# Patient Record
Sex: MALE | Race: WHITE | NOT HISPANIC OR LATINO | Employment: FULL TIME | URBAN - METROPOLITAN AREA
[De-identification: names, ages, dates, MRNs, and addresses within clinical notes are randomized per-mention and may not be internally consistent; named-entity substitution may affect disease eponyms.]

---

## 2017-02-13 ENCOUNTER — TRANSCRIBE ORDERS (OUTPATIENT)
Dept: ADMINISTRATIVE | Facility: HOSPITAL | Age: 72
End: 2017-02-13

## 2017-02-13 DIAGNOSIS — R06.02 SOB (SHORTNESS OF BREATH): Primary | ICD-10-CM

## 2017-02-26 ENCOUNTER — APPOINTMENT (OUTPATIENT)
Dept: LAB | Age: 72
End: 2017-02-26
Payer: MEDICARE

## 2017-02-26 DIAGNOSIS — R73.09 OTHER ABNORMAL GLUCOSE: ICD-10-CM

## 2017-02-26 DIAGNOSIS — I10 ESSENTIAL (PRIMARY) HYPERTENSION: ICD-10-CM

## 2017-02-26 DIAGNOSIS — E78.5 HYPERLIPIDEMIA: ICD-10-CM

## 2017-02-26 LAB
ANION GAP SERPL CALCULATED.3IONS-SCNC: 5 MMOL/L (ref 4–13)
BUN SERPL-MCNC: 15 MG/DL (ref 5–25)
CALCIUM SERPL-MCNC: 9.3 MG/DL (ref 8.3–10.1)
CHLORIDE SERPL-SCNC: 106 MMOL/L (ref 100–108)
CHOLEST SERPL-MCNC: 181 MG/DL (ref 50–200)
CO2 SERPL-SCNC: 29 MMOL/L (ref 21–32)
CREAT SERPL-MCNC: 1.11 MG/DL (ref 0.6–1.3)
EST. AVERAGE GLUCOSE BLD GHB EST-MCNC: 111 MG/DL
GFR SERPL CREATININE-BSD FRML MDRD: >60 ML/MIN/1.73SQ M
GLUCOSE SERPL-MCNC: 109 MG/DL (ref 65–140)
HBA1C MFR BLD: 5.5 % (ref 4.2–6.3)
HDLC SERPL-MCNC: 44 MG/DL (ref 40–60)
LDLC SERPL CALC-MCNC: 107 MG/DL (ref 0–100)
POTASSIUM SERPL-SCNC: 4.1 MMOL/L (ref 3.5–5.3)
SODIUM SERPL-SCNC: 140 MMOL/L (ref 136–145)
TRIGL SERPL-MCNC: 152 MG/DL

## 2017-02-26 PROCEDURE — 80061 LIPID PANEL: CPT

## 2017-02-26 PROCEDURE — 80048 BASIC METABOLIC PNL TOTAL CA: CPT

## 2017-02-26 PROCEDURE — 83036 HEMOGLOBIN GLYCOSYLATED A1C: CPT

## 2017-02-26 PROCEDURE — 36415 COLL VENOUS BLD VENIPUNCTURE: CPT

## 2017-03-13 RX ORDER — ALBUTEROL SULFATE 2.5 MG/3ML
2.5 SOLUTION RESPIRATORY (INHALATION) ONCE AS NEEDED
Status: CANCELLED | OUTPATIENT
Start: 2017-03-13

## 2017-03-15 ENCOUNTER — HOSPITAL ENCOUNTER (OUTPATIENT)
Dept: PULMONOLOGY | Facility: HOSPITAL | Age: 72
Discharge: HOME | End: 2017-03-15
Attending: INTERNAL MEDICINE
Payer: MEDICARE

## 2017-03-28 ENCOUNTER — ALLSCRIPTS OFFICE VISIT (OUTPATIENT)
Dept: OTHER | Facility: OTHER | Age: 72
End: 2017-03-28

## 2017-04-03 ENCOUNTER — HOSPITAL ENCOUNTER (OUTPATIENT)
Dept: PULMONOLOGY | Facility: HOSPITAL | Age: 72
Discharge: HOME | End: 2017-04-03
Attending: INTERNAL MEDICINE
Payer: MEDICARE

## 2017-04-03 RX ORDER — ALBUTEROL SULFATE 2.5 MG/3ML
2.5 SOLUTION RESPIRATORY (INHALATION) ONCE AS NEEDED
Status: DISCONTINUED | OUTPATIENT
Start: 2017-04-03 | End: 2017-04-06 | Stop reason: HOSPADM

## 2017-04-18 ENCOUNTER — GENERIC CONVERSION - ENCOUNTER (OUTPATIENT)
Dept: OTHER | Facility: OTHER | Age: 72
End: 2017-04-18

## 2017-04-18 ENCOUNTER — HOSPITAL ENCOUNTER (OUTPATIENT)
Dept: PULMONOLOGY | Facility: HOSPITAL | Age: 72
Discharge: HOME | End: 2017-04-18
Attending: INTERNAL MEDICINE
Payer: MEDICARE

## 2017-04-18 DIAGNOSIS — R06.02 SOB (SHORTNESS OF BREATH): ICD-10-CM

## 2017-04-18 PROCEDURE — 94070 EVALUATION OF WHEEZING: CPT

## 2017-04-18 PROCEDURE — 94726 PLETHYSMOGRAPHY LUNG VOLUMES: CPT

## 2017-04-18 PROCEDURE — 94760 N-INVAS EAR/PLS OXIMETRY 1: CPT

## 2017-04-18 PROCEDURE — 94729 DIFFUSING CAPACITY: CPT

## 2017-04-18 RX ORDER — ALBUTEROL SULFATE 2.5 MG/3ML
2.5 SOLUTION RESPIRATORY (INHALATION) ONCE AS NEEDED
Status: COMPLETED | OUTPATIENT
Start: 2017-04-18 | End: 2017-04-18

## 2017-04-18 RX ADMIN — ALBUTEROL SULFATE 2.5 MG: 2.5 SOLUTION RESPIRATORY (INHALATION) at 14:15

## 2017-04-18 RX ADMIN — METHACHOLINE CHLORIDE 5 BREATH: 100 POWDER, FOR SOLUTION RESPIRATORY (INHALATION) at 13:30

## 2017-07-22 ENCOUNTER — APPOINTMENT (OUTPATIENT)
Dept: LAB | Age: 72
End: 2017-07-22
Payer: MEDICARE

## 2017-07-22 ENCOUNTER — TRANSCRIBE ORDERS (OUTPATIENT)
Dept: ADMINISTRATIVE | Age: 72
End: 2017-07-22

## 2017-07-22 DIAGNOSIS — I10 ESSENTIAL (PRIMARY) HYPERTENSION: ICD-10-CM

## 2017-07-22 DIAGNOSIS — Z86.39 PERSONAL HISTORY OF OTHER ENDOCRINE, NUTRITIONAL AND METABOLIC DISEASE: ICD-10-CM

## 2017-07-22 DIAGNOSIS — Z12.5 ENCOUNTER FOR SCREENING FOR MALIGNANT NEOPLASM OF PROSTATE: ICD-10-CM

## 2017-07-22 DIAGNOSIS — E78.1 PURE HYPERGLYCERIDEMIA: ICD-10-CM

## 2017-07-22 DIAGNOSIS — R73.09 OTHER ABNORMAL GLUCOSE: ICD-10-CM

## 2017-07-22 LAB
ALBUMIN SERPL BCP-MCNC: 3.7 G/DL (ref 3.5–5)
ALP SERPL-CCNC: 98 U/L (ref 46–116)
ALT SERPL W P-5'-P-CCNC: 33 U/L (ref 12–78)
ANION GAP SERPL CALCULATED.3IONS-SCNC: 6 MMOL/L (ref 4–13)
AST SERPL W P-5'-P-CCNC: 23 U/L (ref 5–45)
BILIRUB SERPL-MCNC: 0.78 MG/DL (ref 0.2–1)
BUN SERPL-MCNC: 13 MG/DL (ref 5–25)
CALCIUM SERPL-MCNC: 8.9 MG/DL (ref 8.3–10.1)
CHLORIDE SERPL-SCNC: 108 MMOL/L (ref 100–108)
CHOLEST SERPL-MCNC: 135 MG/DL (ref 50–200)
CO2 SERPL-SCNC: 26 MMOL/L (ref 21–32)
CREAT SERPL-MCNC: 1 MG/DL (ref 0.6–1.3)
EST. AVERAGE GLUCOSE BLD GHB EST-MCNC: 111 MG/DL
GFR SERPL CREATININE-BSD FRML MDRD: >60 ML/MIN/1.73SQ M
GLUCOSE P FAST SERPL-MCNC: 99 MG/DL (ref 65–99)
HBA1C MFR BLD: 5.5 % (ref 4.2–6.3)
HDLC SERPL-MCNC: 37 MG/DL (ref 40–60)
LDLC SERPL CALC-MCNC: 76 MG/DL (ref 0–100)
POTASSIUM SERPL-SCNC: 3.6 MMOL/L (ref 3.5–5.3)
PROT SERPL-MCNC: 7 G/DL (ref 6.4–8.2)
PSA SERPL-MCNC: 1.7 NG/ML (ref 0–4)
SODIUM SERPL-SCNC: 140 MMOL/L (ref 136–145)
TRIGL SERPL-MCNC: 109 MG/DL

## 2017-07-22 PROCEDURE — 36415 COLL VENOUS BLD VENIPUNCTURE: CPT

## 2017-07-22 PROCEDURE — 80061 LIPID PANEL: CPT

## 2017-07-22 PROCEDURE — 80053 COMPREHEN METABOLIC PANEL: CPT

## 2017-07-22 PROCEDURE — G0103 PSA SCREENING: HCPCS

## 2017-07-22 PROCEDURE — 83036 HEMOGLOBIN GLYCOSYLATED A1C: CPT

## 2017-07-24 DIAGNOSIS — E78.1 PURE HYPERGLYCERIDEMIA: ICD-10-CM

## 2017-07-24 DIAGNOSIS — I10 ESSENTIAL (PRIMARY) HYPERTENSION: ICD-10-CM

## 2017-07-24 DIAGNOSIS — R73.09 OTHER ABNORMAL GLUCOSE: ICD-10-CM

## 2017-07-24 DIAGNOSIS — Z86.39 PERSONAL HISTORY OF OTHER ENDOCRINE, NUTRITIONAL AND METABOLIC DISEASE: ICD-10-CM

## 2017-07-24 DIAGNOSIS — Z12.5 ENCOUNTER FOR SCREENING FOR MALIGNANT NEOPLASM OF PROSTATE: ICD-10-CM

## 2017-07-31 ENCOUNTER — GENERIC CONVERSION - ENCOUNTER (OUTPATIENT)
Dept: OTHER | Facility: OTHER | Age: 72
End: 2017-07-31

## 2017-08-10 ENCOUNTER — ALLSCRIPTS OFFICE VISIT (OUTPATIENT)
Dept: OTHER | Facility: OTHER | Age: 72
End: 2017-08-10

## 2017-10-11 ENCOUNTER — GENERIC CONVERSION - ENCOUNTER (OUTPATIENT)
Dept: OTHER | Facility: OTHER | Age: 72
End: 2017-10-11

## 2017-10-26 ENCOUNTER — GENERIC CONVERSION - ENCOUNTER (OUTPATIENT)
Dept: OTHER | Facility: OTHER | Age: 72
End: 2017-10-26

## 2017-11-07 ENCOUNTER — GENERIC CONVERSION - ENCOUNTER (OUTPATIENT)
Dept: OTHER | Facility: OTHER | Age: 72
End: 2017-11-07

## 2017-11-26 ENCOUNTER — APPOINTMENT (OUTPATIENT)
Dept: LAB | Age: 72
End: 2017-11-26
Payer: MEDICARE

## 2017-11-26 DIAGNOSIS — I10 ESSENTIAL (PRIMARY) HYPERTENSION: ICD-10-CM

## 2017-11-26 DIAGNOSIS — R73.09 OTHER ABNORMAL GLUCOSE: ICD-10-CM

## 2017-11-26 DIAGNOSIS — E78.1 PURE HYPERGLYCERIDEMIA: ICD-10-CM

## 2017-11-26 DIAGNOSIS — Z86.39 PERSONAL HISTORY OF OTHER ENDOCRINE, NUTRITIONAL AND METABOLIC DISEASE: ICD-10-CM

## 2017-11-26 LAB
ALBUMIN SERPL BCP-MCNC: 3.8 G/DL (ref 3.5–5)
ALP SERPL-CCNC: 106 U/L (ref 46–116)
ALT SERPL W P-5'-P-CCNC: 39 U/L (ref 12–78)
ANION GAP SERPL CALCULATED.3IONS-SCNC: 4 MMOL/L (ref 4–13)
AST SERPL W P-5'-P-CCNC: 32 U/L (ref 5–45)
BILIRUB SERPL-MCNC: 0.74 MG/DL (ref 0.2–1)
BUN SERPL-MCNC: 14 MG/DL (ref 5–25)
CALCIUM SERPL-MCNC: 9.3 MG/DL (ref 8.3–10.1)
CHLORIDE SERPL-SCNC: 107 MMOL/L (ref 100–108)
CHOLEST SERPL-MCNC: 159 MG/DL (ref 50–200)
CO2 SERPL-SCNC: 29 MMOL/L (ref 21–32)
CREAT SERPL-MCNC: 1.08 MG/DL (ref 0.6–1.3)
EST. AVERAGE GLUCOSE BLD GHB EST-MCNC: 108 MG/DL
GFR SERPL CREATININE-BSD FRML MDRD: 67 ML/MIN/1.73SQ M
GLUCOSE P FAST SERPL-MCNC: 92 MG/DL (ref 65–99)
HBA1C MFR BLD: 5.4 % (ref 4.2–6.3)
HDLC SERPL-MCNC: 43 MG/DL (ref 40–60)
LDLC SERPL CALC-MCNC: 91 MG/DL (ref 0–100)
POTASSIUM SERPL-SCNC: 4.3 MMOL/L (ref 3.5–5.3)
PROT SERPL-MCNC: 7.6 G/DL (ref 6.4–8.2)
SODIUM SERPL-SCNC: 140 MMOL/L (ref 136–145)
TRIGL SERPL-MCNC: 126 MG/DL

## 2017-11-26 PROCEDURE — 36415 COLL VENOUS BLD VENIPUNCTURE: CPT

## 2017-11-26 PROCEDURE — 80053 COMPREHEN METABOLIC PANEL: CPT

## 2017-11-26 PROCEDURE — 83036 HEMOGLOBIN GLYCOSYLATED A1C: CPT

## 2017-11-26 PROCEDURE — 80061 LIPID PANEL: CPT

## 2017-12-01 DIAGNOSIS — R73.09 OTHER ABNORMAL GLUCOSE: ICD-10-CM

## 2017-12-01 DIAGNOSIS — Z86.39 PERSONAL HISTORY OF OTHER ENDOCRINE, NUTRITIONAL AND METABOLIC DISEASE: ICD-10-CM

## 2017-12-01 DIAGNOSIS — E78.1 PURE HYPERGLYCERIDEMIA: ICD-10-CM

## 2017-12-01 DIAGNOSIS — I10 ESSENTIAL (PRIMARY) HYPERTENSION: ICD-10-CM

## 2017-12-11 ENCOUNTER — ALLSCRIPTS OFFICE VISIT (OUTPATIENT)
Dept: OTHER | Facility: OTHER | Age: 72
End: 2017-12-11

## 2017-12-12 NOTE — PROGRESS NOTES
Assessment    1  Abnormal blood sugar (790 29) (R73 09)   2  Benign essential hypertension (401 1) (I10)   3  CAD in native artery (414 01) (I25 10)   4  ED (erectile dysfunction) (607 84) (N52 9)   5  Hypercholesterolemia (272 0) (E78 00)   6  Hypertriglyceridemia (272 1) (E78 1)    Plan  Abnormal blood sugar, Benign essential hypertension, CAD in native artery, Hypercholesterolemia,Hypertriglyceridemia    · (1) HEMOGLOBIN A1C; Status:Active; Requested for:[id number]; Benign essential hypertension, CAD in native artery, Hypercholesterolemia, Hypertriglyceridemia    · (1) COMPREHENSIVE METABOLIC PANEL; Status:Active; Requested for:[id number];    · (1) LIPID PANEL FASTING W DIRECT LDL REFLEX; Status:Active; Requested for:[id number];   1  Mild glucose intolerance good blood sugar control continue careful lifestyle with avoidance of high carbohydrate foods and high calorie foods  2   Hypertension initial presentation blood pressure was mildly elevated today but subsequent reading have reviewed turned into a safe acceptable range patient will continue on his present medications  3   Coronary artery disease without any signs as or symptoms of angina palpitations or shortness of breath  4   Erectile dysfunction patient was provided with samples of Viagra 100 mg numbers for eye was instructed take a half a pill on his 1st attempt and if not successful to try a full pill  He should not use any nitroglycerin type medications in conjunction with the Viagra  He has previously tried 25 mg of Viagra without any benefit  5   Hypercholesterolemia and hypertriglyceridemia profile was reviewed with the patient shows good control he is encouraged to continue on a healthy diet regular walking exercise and weight control along with his statin he is present on 40 mg of atorvastatin daily  Discussion/Summary  Discussion Summary:   In summary the patient presents today for a routine 4 month follow-up visit   Reviewed with him his most recent blood work as well as reviewed his medications  He will be having a surgical procedure on his left shoulder in the near future because of a partial tear to his lower rotator cuff  I will see the patient in 4 months for his next regular office visit he was provided with a request for blood work to be done at that time  Counseling Documentation With Imm: total time of encounter was 30 minutes-- and-- 20 minutes was spent counseling  Chief Complaint  Chief Complaint Free Text Note Form: patient is here for a 4 month follow up  History of Present Illness  HPI: This is a 4 month follow-up visit for this 24-year-old gentleman  He presents today for a medical checkup as well as review of blood work  He has a history of mild glucose intolerance hypertension coronary artery disease hypertriglyceridemia and hypercholesterolemia  He also has a history of erectile dysfunction  He is provided with samples of Viagra 100 mg to be taken 1/2 tablet p r n  and if unsuccessful to increase to 1 tablet p r n  Lynita Ped patient is hypertension appears to be adequately controlled this initial reading was slightly elevated but subsequent reading was within acceptable range  He has a history of high cholesterol and triglyceride values both of which were within normal range on his most recent set of blood work which were was reviewed in detail with the patient  The patient's previous glucose intolerance seems to be well controlled with a normal hemoglobin A1c and fasting glucose  Patient reports no chest pains palpitations or shortness of breath or peripheral edema at this time  does have left shoulder pain and will be seeing [place] Dustin Garcia [place] ([facility]) for surgical intervention in the near future  Review of Systems  Complete-Male:  Constitutional: No fever or chills, feels well, no tiredness, no recent weight gain or weight loss    Eyes: No complaints of eye pain, no red eyes, no discharge from eyes, no itchy eyes   ENT: no complaints of earache, no hearing loss, no nosebleeds, no nasal discharge, no sore throat, no hoarseness  Cardiovascular: No complaints of slow heart rate, no fast heart rate, no chest pain, no palpitations, no leg claudication, no lower extremity  Respiratory: No complaints of shortness of breath, no wheezing, no cough, no SOB on exertion, no orthopnea or PND  Gastrointestinal: No complaints of abdominal pain, no constipation, no nausea or vomiting, no diarrhea or bloody stools  Genitourinary: No complaints of dysuria, no incontinence, no hesitancy, no nocturia, no genital lesion, no testicular pain  Musculoskeletal: Left shoulder pain, but-- as noted in HPI  Integumentary: No complaints of skin rash or skin lesions, no itching, no skin wound, no dry skin  Neurological: No compliants of headache, no confusion, no convulsions, no numbness or tingling, no dizziness or fainting, no limb weakness, no difficulty walking  Psychiatric: Is not suicidal, no sleep disturbances, no anxiety or depression, no change in personality, no emotional problems  Endocrine: No complaints of proptosis, no hot flashes, no muscle weakness, no erectile dysfunction, no deepening of the voice, no feelings of weakness  Hematologic/Lymphatic: No complaints of swollen glands, no swollen glands in the neck, does not bleed easily, no easy bruising  Active Problems  1  Abnormal blood sugar (790 29) (R73 09)   2  Benign essential hypertension (401 1) (I10)   3  CAD in native artery (414 01) (I25 10)   4  ED (erectile dysfunction) (607 84) (N52 9)   5  Hypercholesterolemia (272 0) (E78 00)   6  Hypertriglyceridemia (272 1) (E78 1)   7  Lumbar canal stenosis (724 02) (M48 061)   8  Need for vaccination with 13-polyvalent pneumococcal conjugate vaccine (V03 82) (Z23)   9  Prostate cancer screening (V76 44) (Z12 5)   10  Restrictive lung disease (518 89) (J98 4)    Past Medical History  1   History of Elevated liver enzymes (790 5) (R74 8)   2  History of Rib pain on right side (786 50) (R07 81)  Active Problems And Past Medical History Reviewed: The active problems and past medical history were reviewed and updated today  Surgical History  Surgical History Reviewed: The surgical history was reviewed and updated today  Family History  Mother    1  Family history of Congestive heart failure (428 0) (I50 9)  Father    2  Family history of Congestive heart failure (428 0) (I50 9)  Brother    3  Family history of Stroke (434 91) (I63 9)  Family History Reviewed: The family history was reviewed and updated today  Social History     · Never smoker  Social History Reviewed: The social history was reviewed and updated today  The social history was reviewed and is unchanged  Current Meds   1  Atorvastatin Calcium 40 MG Oral Tablet; Take 1 tablet daily; Therapy: 77Xus4023 to (Evaluate:20Lnr5221); Last Rx:10Aug2017 Ordered   2  DilTIAZem HCl - 120 MG Oral Tablet; Therapy: 68XIM1198 to Recorded   3  Ecotrin 325 MG Oral Tablet Delayed Release; 1 TABLET DAILY Recorded   4  Fish Oil 1200 MG Oral Capsule; Therapy: 27CBB1065 to Recorded   5  Lisinopril TABS Recorded   6  Nitrostat 0 4 MG Sublingual Tablet Sublingual; PLACE 1 TABLET UNDER THE TONGUE EVERY 5 MINUTES FOR UP TO 3 DOSES AS NEEDED FOR CHEST PAIN  CALL 911 IF PAIN PERSISTS; Therapy: 47MPF0708 to (Evaluate:45Nfb6670); Last Rx:60Cns2141 Ordered   7  Viagra 100 MG Oral Tablet; TAKE AS DIRECTED  MAX 1/DAY; Therapy: 06LJD0565 to (Evaluate:16Nov2017); Last Rx:23Oct2017 Ordered   8  Warfarin Sodium 5 MG Oral Tablet; TAKE 1 OR 2 TABLETS DAILY AS DIRECTED Recorded  Medication List Reviewed: The medication list was reviewed and updated today  Allergies  1  No Known Environmental Allergies   2   No Known Food Allergies    Vitals  Vital Signs    Recorded: 37Kby3605 03:40PM Recorded: 65MOX3831 03:19PM   Temperature  97 8 F   Heart Rate  78   Respiration  14   Systolic 942 936   Diastolic 74 88   Height  5 ft 11 in   Weight  184 lb 0 2 oz   BMI Calculated  25 66   BSA Calculated  2 04   O2 Saturation  98       Physical Exam   Constitutional  General appearance: No acute distress, well appearing and well nourished  Eyes  Conjunctiva and lids: No swelling, erythema, or discharge  Ears, Nose, Mouth, and Throat  External inspection of ears and nose: Normal    Pulmonary  Respiratory effort: No increased work of breathing or signs of respiratory distress  Auscultation of lungs: Clear to auscultation, equal breath sounds bilaterally, no wheezes, no rales, no rhonci  Cardiovascular  Auscultation of heart: Normal rate and rhythm, normal S1 and S2, without murmurs  Examination of extremities for edema and/or varicosities: Normal    Carotid pulses: Normal          Future Appointments    Date/Time Provider Specialty Site   04/10/2018 02:30 PM DELMIS Cheng   Internal Medicine [facility]       Signatures   Electronically signed by : DELMIS Pelletier ; Dec 11 2017  6:35PM EST                       (Author)

## 2018-01-13 VITALS
WEIGHT: 187 LBS | OXYGEN SATURATION: 97 % | HEART RATE: 80 BPM | RESPIRATION RATE: 16 BRPM | HEIGHT: 71 IN | TEMPERATURE: 97.5 F | DIASTOLIC BLOOD PRESSURE: 78 MMHG | BODY MASS INDEX: 26.18 KG/M2 | SYSTOLIC BLOOD PRESSURE: 140 MMHG

## 2018-01-14 VITALS
TEMPERATURE: 96.6 F | HEIGHT: 71 IN | SYSTOLIC BLOOD PRESSURE: 130 MMHG | WEIGHT: 184 LBS | DIASTOLIC BLOOD PRESSURE: 66 MMHG | BODY MASS INDEX: 25.76 KG/M2 | HEART RATE: 69 BPM | OXYGEN SATURATION: 97 % | RESPIRATION RATE: 16 BRPM

## 2018-01-23 VITALS
SYSTOLIC BLOOD PRESSURE: 130 MMHG | TEMPERATURE: 97.8 F | HEIGHT: 71 IN | OXYGEN SATURATION: 98 % | BODY MASS INDEX: 25.76 KG/M2 | DIASTOLIC BLOOD PRESSURE: 74 MMHG | WEIGHT: 184.01 LBS | RESPIRATION RATE: 14 BRPM | HEART RATE: 78 BPM

## 2018-03-30 ENCOUNTER — APPOINTMENT (OUTPATIENT)
Dept: LAB | Age: 73
End: 2018-03-30
Payer: MEDICARE

## 2018-03-30 DIAGNOSIS — E78.1 PURE HYPERGLYCERIDEMIA: ICD-10-CM

## 2018-03-30 DIAGNOSIS — I10 ESSENTIAL (PRIMARY) HYPERTENSION: ICD-10-CM

## 2018-03-30 DIAGNOSIS — E78.00 PURE HYPERCHOLESTEROLEMIA: ICD-10-CM

## 2018-03-30 DIAGNOSIS — I25.10 ATHEROSCLEROTIC HEART DISEASE OF NATIVE CORONARY ARTERY WITHOUT ANGINA PECTORIS: ICD-10-CM

## 2018-03-30 DIAGNOSIS — R73.09 OTHER ABNORMAL GLUCOSE: ICD-10-CM

## 2018-03-30 LAB
ALBUMIN SERPL BCP-MCNC: 3.5 G/DL (ref 3.5–5)
ALP SERPL-CCNC: 111 U/L (ref 46–116)
ALT SERPL W P-5'-P-CCNC: 33 U/L (ref 12–78)
ANION GAP SERPL CALCULATED.3IONS-SCNC: 6 MMOL/L (ref 4–13)
AST SERPL W P-5'-P-CCNC: 26 U/L (ref 5–45)
BILIRUB SERPL-MCNC: 1.05 MG/DL (ref 0.2–1)
BUN SERPL-MCNC: 13 MG/DL (ref 5–25)
CALCIUM SERPL-MCNC: 8.8 MG/DL (ref 8.3–10.1)
CHLORIDE SERPL-SCNC: 110 MMOL/L (ref 100–108)
CHOLEST SERPL-MCNC: 163 MG/DL (ref 50–200)
CO2 SERPL-SCNC: 27 MMOL/L (ref 21–32)
CREAT SERPL-MCNC: 1.09 MG/DL (ref 0.6–1.3)
EST. AVERAGE GLUCOSE BLD GHB EST-MCNC: 114 MG/DL
GFR SERPL CREATININE-BSD FRML MDRD: 67 ML/MIN/1.73SQ M
GLUCOSE P FAST SERPL-MCNC: 94 MG/DL (ref 65–99)
HBA1C MFR BLD: 5.6 % (ref 4.2–6.3)
HDLC SERPL-MCNC: 36 MG/DL (ref 40–60)
LDLC SERPL CALC-MCNC: 102 MG/DL (ref 0–100)
POTASSIUM SERPL-SCNC: 4.1 MMOL/L (ref 3.5–5.3)
PROT SERPL-MCNC: 7.7 G/DL (ref 6.4–8.2)
SODIUM SERPL-SCNC: 143 MMOL/L (ref 136–145)
TRIGL SERPL-MCNC: 127 MG/DL

## 2018-03-30 PROCEDURE — 80061 LIPID PANEL: CPT

## 2018-03-30 PROCEDURE — 80053 COMPREHEN METABOLIC PANEL: CPT

## 2018-03-30 PROCEDURE — 36415 COLL VENOUS BLD VENIPUNCTURE: CPT

## 2018-03-30 PROCEDURE — 83036 HEMOGLOBIN GLYCOSYLATED A1C: CPT

## 2018-04-10 ENCOUNTER — OFFICE VISIT (OUTPATIENT)
Dept: INTERNAL MEDICINE CLINIC | Facility: CLINIC | Age: 73
End: 2018-04-10
Payer: MEDICARE

## 2018-04-10 VITALS
BODY MASS INDEX: 26.52 KG/M2 | SYSTOLIC BLOOD PRESSURE: 156 MMHG | WEIGHT: 189.4 LBS | HEIGHT: 71 IN | TEMPERATURE: 97.7 F | HEART RATE: 67 BPM | OXYGEN SATURATION: 93 % | DIASTOLIC BLOOD PRESSURE: 94 MMHG | RESPIRATION RATE: 14 BRPM

## 2018-04-10 DIAGNOSIS — E78.5 HYPERLIPIDEMIA, UNSPECIFIED HYPERLIPIDEMIA TYPE: Primary | ICD-10-CM

## 2018-04-10 DIAGNOSIS — J98.4 RESTRICTIVE LUNG DISEASE: ICD-10-CM

## 2018-04-10 DIAGNOSIS — E78.00 HYPERCHOLESTEROLEMIA: ICD-10-CM

## 2018-04-10 DIAGNOSIS — E78.1 HYPERTRIGLYCERIDEMIA: ICD-10-CM

## 2018-04-10 DIAGNOSIS — E74.39 GLUCOSE INTOLERANCE: ICD-10-CM

## 2018-04-10 DIAGNOSIS — E66.3 OVER WEIGHT: ICD-10-CM

## 2018-04-10 DIAGNOSIS — I25.10 CAD IN NATIVE ARTERY: ICD-10-CM

## 2018-04-10 DIAGNOSIS — I10 ESSENTIAL HYPERTENSION: ICD-10-CM

## 2018-04-10 PROCEDURE — 99215 OFFICE O/P EST HI 40 MIN: CPT | Performed by: INTERNAL MEDICINE

## 2018-04-10 RX ORDER — DILTIAZEM HYDROCHLORIDE 120 MG/1
120 TABLET, FILM COATED ORAL
COMMUNITY
Start: 2017-08-10 | End: 2018-12-21 | Stop reason: SDUPTHER

## 2018-04-10 RX ORDER — LISINOPRIL 10 MG/1
20 TABLET ORAL DAILY
Qty: 60 TABLET | Refills: 0 | Status: SHIPPED | OUTPATIENT
Start: 2018-04-10 | End: 2019-08-15 | Stop reason: SDUPTHER

## 2018-04-10 RX ORDER — LISINOPRIL 30 MG/1
30 TABLET ORAL
COMMUNITY
End: 2018-04-10 | Stop reason: SDUPTHER

## 2018-04-10 RX ORDER — WARFARIN SODIUM 5 MG/1
5 TABLET ORAL
COMMUNITY

## 2018-04-10 RX ORDER — SILDENAFIL 100 MG/1
100 TABLET, FILM COATED ORAL
COMMUNITY
Start: 2017-10-23 | End: 2022-01-18

## 2018-04-10 RX ORDER — ATORVASTATIN CALCIUM 40 MG/1
40 TABLET, FILM COATED ORAL DAILY
COMMUNITY
Start: 2016-09-12 | End: 2018-10-13 | Stop reason: SDUPTHER

## 2018-04-10 RX ORDER — AMOXICILLIN 500 MG
CAPSULE ORAL
COMMUNITY
Start: 2017-08-10

## 2018-04-10 RX ORDER — NITROGLYCERIN 0.4 MG/1
1 TABLET SUBLINGUAL
COMMUNITY
Start: 2016-05-11

## 2018-04-11 DIAGNOSIS — R73.09 OTHER ABNORMAL GLUCOSE: ICD-10-CM

## 2018-04-11 DIAGNOSIS — E78.00 PURE HYPERCHOLESTEROLEMIA: ICD-10-CM

## 2018-04-11 DIAGNOSIS — I10 ESSENTIAL (PRIMARY) HYPERTENSION: ICD-10-CM

## 2018-04-11 DIAGNOSIS — I25.10 ATHEROSCLEROTIC HEART DISEASE OF NATIVE CORONARY ARTERY WITHOUT ANGINA PECTORIS: ICD-10-CM

## 2018-04-11 DIAGNOSIS — E78.1 PURE HYPERGLYCERIDEMIA: ICD-10-CM

## 2018-04-11 PROBLEM — E74.39 GLUCOSE INTOLERANCE: Status: ACTIVE | Noted: 2018-04-11

## 2018-04-11 PROBLEM — Z79.01 ANTICOAGULANT LONG-TERM USE: Status: ACTIVE | Noted: 2017-10-11

## 2018-04-11 PROBLEM — N52.9 ED (ERECTILE DYSFUNCTION): Status: ACTIVE | Noted: 2017-08-10

## 2018-04-11 PROBLEM — E66.3 OVER WEIGHT: Status: ACTIVE | Noted: 2018-04-11

## 2018-04-11 NOTE — ASSESSMENT & PLAN NOTE
History of coronary artery disease no symptoms at this point of chest pain or palpitations continue follow-up with Cardiology

## 2018-04-11 NOTE — ASSESSMENT & PLAN NOTE
Recent lipid profile reviewed with the patient including his triglyceride values healthy diet reviewed with patient recheck values in 4 months    Continue on a toward his statin 40 milligrams daily also continue Omega 3 fatty acids

## 2018-04-11 NOTE — ASSESSMENT & PLAN NOTE
Ongoing SW/CM Assessment/Plan of Care Note     See SW/CM flowsheets for goals and other objective data.    Patient/Family discharge goal (s):             PT Recommendation:       OT Recommendation:       SLP Recommendation:       Disposition:       Progress note:   Patient admitted with abdominal pain and vomiting.  Patient has a past history of alcohol abuse and hypertension.  Patient usually receives his care at [facility].  GI on consult.  EGD completed along with CT of abdomen and ultrasound of liver, gallbladder and pancreas.  Patient continues to be nauseated with emesis.  Hida scan completed today. Patient lives with his sister and states he can return to his prior living arrangement.     Mary Flores RN  Inpatient            Follow-up History of glucose intolerance most recent blood sugar for this visit is a reading of 94 with a hemoglobin A1c reading of 5 6    Continue dietary control of glucose intolerance follow-up blood work in 4 months please

## 2018-04-11 NOTE — PROGRESS NOTES
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  The patient does have some shortness of breath on exertion which does not seem to be associated with chest discomfort  I suspect it is more pulmonary limitation on exertion than cardiac at this point  Essential hypertension  The patient's blood pressure today is elevated and he indicates that has been elevated at several other office visits recently  He is presently on 10 milligrams of lisinopril daily and I have asked him to increase his lisinopril to 20 milligrams daily  He has an upcoming visit in the next week to week and a half with his cardiologist which will be perfect timing for follow-up assessment of his blood pressure  He is also presently on Cardizem 120 milligrams daily with a heart rate of 67 beats per minute I am hesitant to increase the strength of the calcium channel blocker at this time  CAD in native artery  History of coronary artery disease no symptoms at this point of chest pain or palpitations continue follow-up with Cardiology  Glucose intolerance  Follow-up History of glucose intolerance most recent blood sugar for this visit is a reading of 94 with a hemoglobin A1c reading of 5 6  Continue dietary control of glucose intolerance follow-up blood work in 4 months please    Hypercholesterolemia  Results of recent lipid profile reviewed with the patient he is presently on atorvastatin 40 milligrams daily healthy diet reviewed exercise and weight reduction also advised will recheck lipid profile in 4 months    Hypertriglyceridemia  Recent lipid profile reviewed with the patient including his triglyceride values healthy diet reviewed with patient recheck values in 4 months    Continue on a toward his statin 40 milligrams daily also continue Omega 3 fatty acids    Over weight  Overweight with a body mass index of 26 42 patient is frustrated at have slowly he loses weight  He is encouraged to reduce calories by 25 percent on a daily basis and try to maintain some regular walking exercise or bicycling  Diagnoses and all orders for this visit:    Hyperlipidemia, unspecified hyperlipidemia type  -     Lipid panel; Future  -     Comprehensive metabolic panel; Future    Glucose intolerance  -     HEMOGLOBIN A1C W/ EAG ESTIMATION; Future  -     PSA; Future    Essential hypertension  -     lisinopril (ZESTRIL) 10 mg tablet; Take 2 tablets (20 mg total) by mouth daily    Restrictive lung disease    CAD in native artery    Hypercholesterolemia    Hypertriglyceridemia    Over weight    Other orders  -     aspirin 81 MG tablet; Take 81 mg by mouth  -     atorvastatin (LIPITOR) 40 mg tablet; Take 40 mg by mouth daily  -     diltiazem (CARDIZEM) 120 MG tablet; Take 120 mg by mouth  -     Omega-3 Fatty Acids (FISH OIL) 1200 MG CAPS; Take by mouth  -     warfarin (COUMADIN) 5 mg tablet; Take 5 mg by mouth  -     nitroglycerin (NITROSTAT) 0 4 mg SL tablet; Place 1 tablet under the tongue every 5 (five) minutes as needed  -     Discontinue: lisinopril (ZESTRIL) 30 mg tablet; Take 30 mg by mouth  -     sildenafil (VIAGRA) 100 mg tablet; Take 100 mg by mouth        Subjective:      Patient ID: Semaj Hugo  is a 74 y o  male  This is a routine 4 month follow-up visit for this 77-year-old gentleman  He presents today with concerns about increasing weight  He has had difficulty losing weight over the past several months and his weight is now 189 pounds on today's visit  This correlates to a body mass index of 26 42  The bulk of his weight gain has been through the abdominal region  He is also concerned about difficulty sleeping  He indicates that he falls asleep reasonably easy but then wakes and has difficulty falling back to sleep again    He has tried both Aleve and Aleve p m  and find some benefit from the leave but not Aleve p  m     He denies any chest pains palpitations or shortness of breath  His blood pressure on evaluation today is 156/94  He is presently on a combination of 10 milligrams of lisinopril daily and 120 milligrams of Cardizem daily  Blood work for the patient's lipid profile as well as glucose was reviewed with him in detail today  The following portions of the patient's history were reviewed and updated as appropriate:   He  has a past medical history of Elevated liver enzymes  He   Patient Active Problem List    Diagnosis Date Noted    Essential hypertension 04/11/2018    Glucose intolerance 04/11/2018    Over weight 04/11/2018    Anticoagulant long-term use 10/11/2017    ED (erectile dysfunction) 08/10/2017    Hypercholesterolemia 08/10/2017    Hypertriglyceridemia 09/12/2016    Restrictive lung disease 09/12/2016    CAD in native artery 05/12/2016    TIA (transient ischemic attack) 02/24/2016    SKYLER (obstructive sleep apnea) 11/06/2015    Lumbar canal stenosis 09/13/2013     He  has no past surgical history on file  His family history includes Heart failure in his father and mother; Stroke in his brother  He  reports that he has never smoked  He does not have any smokeless tobacco history on file  His alcohol and drug histories are not on file       Review of Systems   Constitutional: Positive for fatigue  HENT: Negative  Eyes: Negative  Respiratory: Positive for shortness of breath  Gastrointestinal: Negative  Endocrine: Negative  Genitourinary: Negative  Musculoskeletal: Negative  Skin: Negative  Allergic/Immunologic: Negative  Neurological: Negative  Hematological: Negative  Psychiatric/Behavioral: Negative            Objective:      /94   Pulse 67   Temp 97 7 °F (36 5 °C)   Resp 14   Ht 5' 11" (1 803 m)   Wt 85 9 kg (189 lb 6 4 oz)   SpO2 93%   BMI 26 42 kg/m²          Physical Exam   Constitutional: He is oriented to person, place, and time  He appears well-developed and well-nourished  No distress  HENT:   Head: Normocephalic and atraumatic  Right Ear: Hearing, tympanic membrane, external ear and ear canal normal    Left Ear: Hearing, tympanic membrane, external ear and ear canal normal    Nose: Nose normal    Mouth/Throat: Oropharynx is clear and moist and mucous membranes are normal    Eyes: Conjunctivae are normal  Pupils are equal, round, and reactive to light  Right eye exhibits no discharge  Left eye exhibits no discharge  No scleral icterus  Neck: No JVD present  No tracheal deviation present  No thyromegaly present  Cardiovascular: Normal rate, regular rhythm, S1 normal, S2 normal, normal heart sounds and intact distal pulses  No murmur heard  Pulmonary/Chest: Effort normal and breath sounds normal  No respiratory distress  He has no wheezes  He has no rales  He exhibits no tenderness  Musculoskeletal: Normal range of motion  He exhibits no edema  Lymphadenopathy:     He has no cervical adenopathy  Neurological: He is alert and oriented to person, place, and time  He has normal reflexes  Skin: Skin is warm and dry  No rash noted  He is not diaphoretic  No erythema  No pallor  Psychiatric: He has a normal mood and affect   His behavior is normal  Judgment and thought content normal

## 2018-04-11 NOTE — ASSESSMENT & PLAN NOTE
Overweight with a body mass index of 26 42 patient is frustrated at have slowly he loses weight  He is encouraged to reduce calories by 25 percent on a daily basis and try to maintain some regular walking exercise or bicycling

## 2018-04-11 NOTE — ASSESSMENT & PLAN NOTE
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

## 2018-04-11 NOTE — ASSESSMENT & PLAN NOTE
Results of recent lipid profile reviewed with the patient he is presently on atorvastatin 40 milligrams daily healthy diet reviewed exercise and weight reduction also advised will recheck lipid profile in 4 months

## 2018-04-11 NOTE — ASSESSMENT & PLAN NOTE
The patient's blood pressure today is elevated and he indicates that has been elevated at several other office visits recently  He is presently on 10 milligrams of lisinopril daily and I have asked him to increase his lisinopril to 20 milligrams daily  He has an upcoming visit in the next week to week and a half with his cardiologist which will be perfect timing for follow-up assessment of his blood pressure  He is also presently on Cardizem 120 milligrams daily with a heart rate of 67 beats per minute I am hesitant to increase the strength of the calcium channel blocker at this time

## 2018-07-23 ENCOUNTER — APPOINTMENT (OUTPATIENT)
Dept: LAB | Age: 73
End: 2018-07-23
Payer: MEDICARE

## 2018-07-23 DIAGNOSIS — E78.5 HYPERLIPIDEMIA, UNSPECIFIED HYPERLIPIDEMIA TYPE: ICD-10-CM

## 2018-07-23 DIAGNOSIS — E74.39 GLUCOSE INTOLERANCE: ICD-10-CM

## 2018-07-23 LAB
ALBUMIN SERPL BCP-MCNC: 3.6 G/DL (ref 3.5–5)
ALP SERPL-CCNC: 99 U/L (ref 46–116)
ALT SERPL W P-5'-P-CCNC: 36 U/L (ref 12–78)
ANION GAP SERPL CALCULATED.3IONS-SCNC: 8 MMOL/L (ref 4–13)
AST SERPL W P-5'-P-CCNC: 27 U/L (ref 5–45)
BILIRUB SERPL-MCNC: 0.81 MG/DL (ref 0.2–1)
BUN SERPL-MCNC: 14 MG/DL (ref 5–25)
CALCIUM SERPL-MCNC: 9.3 MG/DL (ref 8.3–10.1)
CHLORIDE SERPL-SCNC: 109 MMOL/L (ref 100–108)
CHOLEST SERPL-MCNC: 149 MG/DL (ref 50–200)
CO2 SERPL-SCNC: 26 MMOL/L (ref 21–32)
CREAT SERPL-MCNC: 1.05 MG/DL (ref 0.6–1.3)
EST. AVERAGE GLUCOSE BLD GHB EST-MCNC: 111 MG/DL
GFR SERPL CREATININE-BSD FRML MDRD: 70 ML/MIN/1.73SQ M
GLUCOSE P FAST SERPL-MCNC: 105 MG/DL (ref 65–99)
HBA1C MFR BLD: 5.5 % (ref 4.2–6.3)
HDLC SERPL-MCNC: 36 MG/DL (ref 40–60)
LDLC SERPL CALC-MCNC: 85 MG/DL (ref 0–100)
NONHDLC SERPL-MCNC: 113 MG/DL
POTASSIUM SERPL-SCNC: 4.2 MMOL/L (ref 3.5–5.3)
PROT SERPL-MCNC: 7.5 G/DL (ref 6.4–8.2)
PSA SERPL-MCNC: 1.6 NG/ML (ref 0–4)
SODIUM SERPL-SCNC: 143 MMOL/L (ref 136–145)
TRIGL SERPL-MCNC: 140 MG/DL

## 2018-07-23 PROCEDURE — G0103 PSA SCREENING: HCPCS

## 2018-07-23 PROCEDURE — 83036 HEMOGLOBIN GLYCOSYLATED A1C: CPT

## 2018-07-23 PROCEDURE — 80061 LIPID PANEL: CPT

## 2018-07-23 PROCEDURE — 36415 COLL VENOUS BLD VENIPUNCTURE: CPT

## 2018-07-23 PROCEDURE — 80053 COMPREHEN METABOLIC PANEL: CPT

## 2018-07-31 ENCOUNTER — OFFICE VISIT (OUTPATIENT)
Dept: INTERNAL MEDICINE CLINIC | Facility: CLINIC | Age: 73
End: 2018-07-31
Payer: MEDICARE

## 2018-07-31 VITALS
RESPIRATION RATE: 14 BRPM | HEIGHT: 71 IN | DIASTOLIC BLOOD PRESSURE: 86 MMHG | BODY MASS INDEX: 26.23 KG/M2 | WEIGHT: 187.4 LBS | SYSTOLIC BLOOD PRESSURE: 140 MMHG | TEMPERATURE: 98.8 F | HEART RATE: 80 BPM | OXYGEN SATURATION: 93 %

## 2018-07-31 DIAGNOSIS — E78.1 HYPERTRIGLYCERIDEMIA: ICD-10-CM

## 2018-07-31 DIAGNOSIS — N28.9 RENAL INSUFFICIENCY: Primary | ICD-10-CM

## 2018-07-31 DIAGNOSIS — E78.00 HYPERCHOLESTEROLEMIA: ICD-10-CM

## 2018-07-31 DIAGNOSIS — E74.39 GLUCOSE INTOLERANCE: ICD-10-CM

## 2018-07-31 DIAGNOSIS — I10 ESSENTIAL HYPERTENSION: ICD-10-CM

## 2018-07-31 PROCEDURE — 99214 OFFICE O/P EST MOD 30 MIN: CPT | Performed by: INTERNAL MEDICINE

## 2018-07-31 RX ORDER — LISINOPRIL 20 MG/1
TABLET ORAL
COMMUNITY
Start: 2018-07-15 | End: 2018-12-21 | Stop reason: SDUPTHER

## 2018-07-31 RX ORDER — DILTIAZEM HYDROCHLORIDE 120 MG/1
CAPSULE, COATED, EXTENDED RELEASE ORAL
COMMUNITY
Start: 2018-07-16 | End: 2019-08-15 | Stop reason: SDUPTHER

## 2018-07-31 NOTE — ASSESSMENT & PLAN NOTE
History of mild glucose intolerance  Glucose is 105 on most recent blood work recommend the patient be cautious with concentrated sweets follow-up testing in 2 weeks

## 2018-07-31 NOTE — ASSESSMENT & PLAN NOTE
History of hypertriglyceridemia reviewed profile of most recent lipid profile with the patient shows excellent control of triglycerides continue healthy diet regular exercise and weight management

## 2018-07-31 NOTE — ASSESSMENT & PLAN NOTE
Hypertension her blood pressure appears to be variable during today's visit  He had some initial elevation which appeared to gradually improved during the course of his stay with us  He is presently on a combination of Cardizem 120 mg daily and lisinopril 20 mg daily for treatment of his hypertension  Would consider if his blood pressure is elevated on follow-up in 2 weeks an increase in the Cardizem to 240 mg daily

## 2018-07-31 NOTE — PROGRESS NOTES
Assessment/Plan:    Essential hypertension  Hypertension her blood pressure appears to be variable during today's visit  He had some initial elevation which appeared to gradually improved during the course of his stay with us  He is presently on a combination of Cardizem 120 mg daily and lisinopril 20 mg daily for treatment of his hypertension  Would consider if his blood pressure is elevated on follow-up in 2 weeks an increase in the Cardizem to 240 mg daily  Renal insufficiency  Renal insufficiency during visit to the emergency room records during emergency room evaluation reviewed today and reviewed with the patient  Appears his renal insufficiency is most likely due to high dehydration state that occurred from working in the heat without adequate rehydration  There was progressive improvement in his creatinine during the course of his stay in the emergency room but his creatinine did not return completely to baseline prior to discharge  I have provided him with a request for follow-up metabolic profile to check his creatinine again at the end of this week in the meantime of asked him to maintain aggressive hydration over the next several days  Glucose intolerance  History of mild glucose intolerance  Glucose is 105 on most recent blood work recommend the patient be cautious with concentrated sweets follow-up testing in 2 weeks  Hypercholesterolemia  Hyperlipidemia reviewed with the patient his lipid profile today recommend continued careful diet to avoid concentrated fats in the diet also continue atorvastatin 40 mg daily  Hypertriglyceridemia  History of hypertriglyceridemia reviewed profile of most recent lipid profile with the patient shows excellent control of triglycerides continue healthy diet regular exercise and weight management  Diagnoses and all orders for this visit:    Renal insufficiency  -     Comprehensive metabolic panel;  Future    Essential hypertension    Glucose intolerance    Hypercholesterolemia    Hypertriglyceridemia    Other orders  -     diltiazem (CARDIZEM CD) 120 mg 24 hr capsule;   -     lisinopril (ZESTRIL) 20 mg tablet;         Subjective:      Patient ID: Khadar Silva  is a 74 y o  male  This 68-year-old gentleman presents today for routine checkup on his blood pressure and review of blood testing pertaining to his glucose intolerance as well as hyperlipidemia and hypertriglyceridemia  He was seen in the emergency room recently for symptoms of dehydration and weakness  He was working in the heat for several hours that day  He became dehydrated and on evaluation in the emergency room was found to have an elevated creatinine  He was hydrated in the emergency room with an improvement in his creatinine but it did not return completely to normal baseline during his stay in the emergency room  We have asked him to have a follow-up creatinine and maintain good hydration each day over the rest of this week  The patient's blood pressure was mildly elevated on today's visit and would like to see him in 2 weeks for reassessment  He does seem to have some degree of anxiety which may be feeding into his hypertension at times  The following portions of the patient's history were reviewed and updated as appropriate: He  has a past medical history of Elevated liver enzymes  He  has no past surgical history on file  His family history includes Heart failure in his father and mother; Stroke in his brother  He  reports that he has never smoked  He does not have any smokeless tobacco history on file  His alcohol and drug histories are not on file       Review of Systems   Constitutional: Positive for fatigue  Psychiatric/Behavioral: The patient is nervous/anxious  All other systems reviewed and are negative          Objective:      /86   Pulse 80   Temp 98 8 °F (37 1 °C)   Resp 14   Ht 5' 11" (1 803 m)   Wt 85 kg (187 lb 6 4 oz)   SpO2 93% BMI 26 14 kg/m²          Physical Exam   Constitutional: He is oriented to person, place, and time  He appears well-developed and well-nourished  No distress  HENT:   Head: Normocephalic and atraumatic  Right Ear: Hearing, tympanic membrane, external ear and ear canal normal    Left Ear: Hearing, tympanic membrane, external ear and ear canal normal    Nose: Nose normal    Mouth/Throat: Oropharynx is clear and moist and mucous membranes are normal    Eyes: Conjunctivae are normal  Pupils are equal, round, and reactive to light  Right eye exhibits no discharge  Left eye exhibits no discharge  Neck: No thyromegaly present  Cardiovascular: Normal rate, regular rhythm, S1 normal, S2 normal, normal heart sounds and intact distal pulses  No murmur heard  Pulmonary/Chest: Effort normal and breath sounds normal  No respiratory distress  He has no wheezes  He has no rales  He exhibits no tenderness  Abdominal: Soft  Bowel sounds are normal  There is no tenderness  Musculoskeletal: Normal range of motion  He exhibits no edema  Lymphadenopathy:     He has no cervical adenopathy  Neurological: He is alert and oriented to person, place, and time  He has normal reflexes  Skin: Skin is warm and dry  No rash noted  He is not diaphoretic  No erythema  No pallor  Psychiatric: He has a normal mood and affect   His behavior is normal  Judgment and thought content normal

## 2018-07-31 NOTE — ASSESSMENT & PLAN NOTE
Renal insufficiency during visit to the emergency room records during emergency room evaluation reviewed today and reviewed with the patient  Appears his renal insufficiency is most likely due to high dehydration state that occurred from working in the heat without adequate rehydration  There was progressive improvement in his creatinine during the course of his stay in the emergency room but his creatinine did not return completely to baseline prior to discharge  I have provided him with a request for follow-up metabolic profile to check his creatinine again at the end of this week in the meantime of asked him to maintain aggressive hydration over the next several days

## 2018-07-31 NOTE — ASSESSMENT & PLAN NOTE
Hyperlipidemia reviewed with the patient his lipid profile today recommend continued careful diet to avoid concentrated fats in the diet also continue atorvastatin 40 mg daily

## 2018-08-06 ENCOUNTER — APPOINTMENT (OUTPATIENT)
Dept: LAB | Age: 73
End: 2018-08-06
Payer: MEDICARE

## 2018-08-06 DIAGNOSIS — N28.9 RENAL INSUFFICIENCY: ICD-10-CM

## 2018-08-06 LAB
ALBUMIN SERPL BCP-MCNC: 3.5 G/DL (ref 3.5–5)
ALP SERPL-CCNC: 88 U/L (ref 46–116)
ALT SERPL W P-5'-P-CCNC: 27 U/L (ref 12–78)
ANION GAP SERPL CALCULATED.3IONS-SCNC: 6 MMOL/L (ref 4–13)
AST SERPL W P-5'-P-CCNC: 20 U/L (ref 5–45)
BILIRUB SERPL-MCNC: 0.49 MG/DL (ref 0.2–1)
BUN SERPL-MCNC: 13 MG/DL (ref 5–25)
CALCIUM SERPL-MCNC: 8.8 MG/DL (ref 8.3–10.1)
CHLORIDE SERPL-SCNC: 108 MMOL/L (ref 100–108)
CO2 SERPL-SCNC: 27 MMOL/L (ref 21–32)
CREAT SERPL-MCNC: 1.11 MG/DL (ref 0.6–1.3)
GFR SERPL CREATININE-BSD FRML MDRD: 65 ML/MIN/1.73SQ M
GLUCOSE SERPL-MCNC: 108 MG/DL (ref 65–140)
POTASSIUM SERPL-SCNC: 4 MMOL/L (ref 3.5–5.3)
PROT SERPL-MCNC: 7.2 G/DL (ref 6.4–8.2)
SODIUM SERPL-SCNC: 141 MMOL/L (ref 136–145)

## 2018-08-06 PROCEDURE — 36415 COLL VENOUS BLD VENIPUNCTURE: CPT

## 2018-08-06 PROCEDURE — 80053 COMPREHEN METABOLIC PANEL: CPT

## 2018-08-16 ENCOUNTER — OFFICE VISIT (OUTPATIENT)
Dept: INTERNAL MEDICINE CLINIC | Facility: CLINIC | Age: 73
End: 2018-08-16
Payer: MEDICARE

## 2018-08-16 VITALS
TEMPERATURE: 99 F | RESPIRATION RATE: 16 BRPM | SYSTOLIC BLOOD PRESSURE: 136 MMHG | DIASTOLIC BLOOD PRESSURE: 78 MMHG | HEIGHT: 71 IN | BODY MASS INDEX: 26.01 KG/M2 | OXYGEN SATURATION: 94 % | WEIGHT: 185.8 LBS | HEART RATE: 81 BPM

## 2018-08-16 DIAGNOSIS — N28.9 RENAL INSUFFICIENCY: ICD-10-CM

## 2018-08-16 DIAGNOSIS — Z23 NEED FOR VACCINATION AGAINST STREPTOCOCCUS PNEUMONIAE: ICD-10-CM

## 2018-08-16 DIAGNOSIS — I10 ESSENTIAL HYPERTENSION: ICD-10-CM

## 2018-08-16 DIAGNOSIS — Z12.11 COLON CANCER SCREENING: Primary | ICD-10-CM

## 2018-08-16 PROCEDURE — G0009 ADMIN PNEUMOCOCCAL VACCINE: HCPCS | Performed by: INTERNAL MEDICINE

## 2018-08-16 PROCEDURE — 99213 OFFICE O/P EST LOW 20 MIN: CPT | Performed by: INTERNAL MEDICINE

## 2018-08-16 PROCEDURE — 90732 PPSV23 VACC 2 YRS+ SUBQ/IM: CPT | Performed by: INTERNAL MEDICINE

## 2018-08-16 RX ORDER — CELECOXIB 200 MG/1
200 CAPSULE ORAL DAILY
Refills: 3 | COMMUNITY
Start: 2018-08-15 | End: 2022-01-11

## 2018-08-16 NOTE — PROGRESS NOTES
Assessment/Plan:    Essential hypertension  Hypertension is improved control continue present medications no change at this time till diltiazem and lisinopril  Follow-up assessment recommended in 4 months  Renal insufficiency  Previous renal insufficiency in [facility] has resolved most likely the result of dehydration  Creatinine has returned to normal value of 1 11  He is encouraged to maintain good hydration through the remaining hot weeks of the summer  Diagnoses and all orders for this visit:    Colon cancer screening  -     Ambulatory referral to Gastroenterology; Future    Need for vaccination against Streptococcus pneumoniae  -     Pneumococcal Polysaccharide Vaccine 23-Valent =>1yo SQ IM    Essential hypertension    Renal insufficiency    Other orders  -     celecoxib (CeleBREX) 200 mg capsule; Take 200 mg by mouth daily With a meal        Subjective:      Patient ID: Jef Kelly  is a 74 y o  male  This is a follow-up visit for this 43-year-old gentleman  Since his last visit he has done well  His dehydration episode is followed up today with follow-up blood work regarding his kidney function  Lab work in the emergency room indicated a creatinine 1 76 it is return back to normal baseline of 1 11  He tries maintain extra hydration on a daily basis  His blood pressure is 136/78 today  The following portions of the patient's history were reviewed and updated as appropriate:   He  has a past medical history of Elevated liver enzymes  He  has no past surgical history on file  His family history includes Heart failure in his father and mother; Stroke in his brother  He  reports that he has never smoked  He does not have any smokeless tobacco history on file  His alcohol and drug histories are not on file    Current Outpatient Prescriptions   Medication Sig Dispense Refill    aspirin 81 MG tablet Take 81 mg by mouth      atorvastatin (LIPITOR) 40 mg tablet Take 40 mg by mouth daily      celecoxib (CeleBREX) 200 mg capsule Take 200 mg by mouth daily With a meal  3    diltiazem (CARDIZEM CD) 120 mg 24 hr capsule       diltiazem (CARDIZEM) 120 MG tablet Take 120 mg by mouth      lisinopril (ZESTRIL) 10 mg tablet Take 2 tablets (20 mg total) by mouth daily 60 tablet 0    lisinopril (ZESTRIL) 20 mg tablet       nitroglycerin (NITROSTAT) 0 4 mg SL tablet Place 1 tablet under the tongue every 5 (five) minutes as needed      Omega-3 Fatty Acids (FISH OIL) 1200 MG CAPS Take by mouth      sildenafil (VIAGRA) 100 mg tablet Take 100 mg by mouth      warfarin (COUMADIN) 5 mg tablet Take 5 mg by mouth       No current facility-administered medications for this visit       Review of Systems   All other systems reviewed and are negative  Objective:      /78   Pulse 81   Temp 99 °F (37 2 °C)   Resp 16   Ht 5' 11" (1 803 m)   Wt 84 3 kg (185 lb 12 8 oz)   SpO2 94%   BMI 25 91 kg/m²          Physical Exam   Constitutional: He is oriented to person, place, and time  He appears well-developed and well-nourished  He appears distressed  HENT:   Right Ear: Hearing, tympanic membrane, external ear and ear canal normal    Left Ear: Hearing, tympanic membrane, external ear and ear canal normal    Nose: Nose normal    Mouth/Throat: Oropharynx is clear and moist and mucous membranes are normal    Eyes: Conjunctivae are normal  Pupils are equal, round, and reactive to light  Neck: No thyromegaly present  Cardiovascular: Normal rate, regular rhythm, S1 normal, S2 normal, normal heart sounds and intact distal pulses  No murmur heard  Pulmonary/Chest: Effort normal and breath sounds normal  No respiratory distress  He has no wheezes  He has no rales  Abdominal: Soft  Bowel sounds are normal  There is no tenderness  Musculoskeletal: Normal range of motion  He exhibits no edema  Lymphadenopathy:     He has no cervical adenopathy     Neurological: He is alert and oriented to person, place, and time  He has normal reflexes  Skin: Skin is warm and dry  He is not diaphoretic  Psychiatric: He has a normal mood and affect   His behavior is normal  Judgment and thought content normal

## 2018-08-16 NOTE — ASSESSMENT & PLAN NOTE
Previous renal insufficiency in [facility] has resolved most likely the result of dehydration  Creatinine has returned to normal value of 1 11  He is encouraged to maintain good hydration through the remaining hot weeks of the summer

## 2018-08-16 NOTE — ASSESSMENT & PLAN NOTE
Hypertension is improved control continue present medications no change at this time till diltiazem and lisinopril  Follow-up assessment recommended in 4 months

## 2018-09-04 ENCOUNTER — TELEPHONE (OUTPATIENT)
Dept: INTERNAL MEDICINE CLINIC | Facility: CLINIC | Age: 73
End: 2018-09-04

## 2018-09-04 NOTE — TELEPHONE ENCOUNTER
Pt wanted to know if you want to order any labs for his December appt,if so I will call him  He will go to [facility]

## 2018-10-13 DIAGNOSIS — E78.5 HYPERLIPIDEMIA, UNSPECIFIED HYPERLIPIDEMIA TYPE: Primary | ICD-10-CM

## 2018-10-15 RX ORDER — ATORVASTATIN CALCIUM 40 MG/1
TABLET, FILM COATED ORAL
Qty: 90 TABLET | Refills: 2 | Status: SHIPPED | OUTPATIENT
Start: 2018-10-15 | End: 2019-07-01 | Stop reason: SDUPTHER

## 2018-10-19 DIAGNOSIS — I10 ESSENTIAL HYPERTENSION: Primary | ICD-10-CM

## 2018-10-19 DIAGNOSIS — E74.39 GLUCOSE INTOLERANCE: ICD-10-CM

## 2018-10-19 DIAGNOSIS — E78.5 HYPERLIPIDEMIA, UNSPECIFIED HYPERLIPIDEMIA TYPE: ICD-10-CM

## 2018-10-19 NOTE — TELEPHONE ENCOUNTER
Pt called again regarding the BW - I don't think the message was routed to you  Pt asked to us to call home # and leave message once labs added

## 2018-10-19 NOTE — TELEPHONE ENCOUNTER
The orders have been placed for the [facility] lab he needs to fast for 12 hours the night before thank you

## 2018-12-15 ENCOUNTER — APPOINTMENT (OUTPATIENT)
Dept: LAB | Age: 73
End: 2018-12-15
Payer: MEDICARE

## 2018-12-15 ENCOUNTER — TRANSCRIBE ORDERS (OUTPATIENT)
Dept: ADMINISTRATIVE | Age: 73
End: 2018-12-15

## 2018-12-15 DIAGNOSIS — I10 ESSENTIAL HYPERTENSION: ICD-10-CM

## 2018-12-15 DIAGNOSIS — E78.5 HYPERLIPIDEMIA, UNSPECIFIED HYPERLIPIDEMIA TYPE: ICD-10-CM

## 2018-12-15 DIAGNOSIS — E74.39 GLUCOSE INTOLERANCE: ICD-10-CM

## 2018-12-15 LAB
ALBUMIN SERPL BCP-MCNC: 3.1 G/DL (ref 3.5–5)
ALP SERPL-CCNC: 123 U/L (ref 46–116)
ALT SERPL W P-5'-P-CCNC: 77 U/L (ref 12–78)
ANION GAP SERPL CALCULATED.3IONS-SCNC: 7 MMOL/L (ref 4–13)
AST SERPL W P-5'-P-CCNC: 38 U/L (ref 5–45)
BILIRUB SERPL-MCNC: 0.51 MG/DL (ref 0.2–1)
BUN SERPL-MCNC: 13 MG/DL (ref 5–25)
CALCIUM SERPL-MCNC: 9 MG/DL (ref 8.3–10.1)
CHLORIDE SERPL-SCNC: 106 MMOL/L (ref 100–108)
CHOLEST SERPL-MCNC: 139 MG/DL (ref 50–200)
CO2 SERPL-SCNC: 27 MMOL/L (ref 21–32)
CREAT SERPL-MCNC: 1.02 MG/DL (ref 0.6–1.3)
EST. AVERAGE GLUCOSE BLD GHB EST-MCNC: 120 MG/DL
GFR SERPL CREATININE-BSD FRML MDRD: 72 ML/MIN/1.73SQ M
GLUCOSE P FAST SERPL-MCNC: 92 MG/DL (ref 65–99)
HBA1C MFR BLD: 5.8 % (ref 4.2–6.3)
HDLC SERPL-MCNC: 27 MG/DL (ref 40–60)
LDLC SERPL CALC-MCNC: 83 MG/DL (ref 0–100)
NONHDLC SERPL-MCNC: 112 MG/DL
POTASSIUM SERPL-SCNC: 4 MMOL/L (ref 3.5–5.3)
PROT SERPL-MCNC: 7.4 G/DL (ref 6.4–8.2)
SODIUM SERPL-SCNC: 140 MMOL/L (ref 136–145)
TRIGL SERPL-MCNC: 144 MG/DL

## 2018-12-15 PROCEDURE — 83036 HEMOGLOBIN GLYCOSYLATED A1C: CPT

## 2018-12-15 PROCEDURE — 36415 COLL VENOUS BLD VENIPUNCTURE: CPT

## 2018-12-15 PROCEDURE — 80061 LIPID PANEL: CPT

## 2018-12-15 PROCEDURE — 80053 COMPREHEN METABOLIC PANEL: CPT

## 2018-12-19 PROBLEM — I47.10 SUPRAVENTRICULAR TACHYCARDIA: Status: ACTIVE | Noted: 2018-12-19

## 2018-12-19 PROBLEM — I47.1 SUPRAVENTRICULAR TACHYCARDIA (HCC): Status: ACTIVE | Noted: 2018-12-19

## 2018-12-21 ENCOUNTER — OFFICE VISIT (OUTPATIENT)
Dept: INTERNAL MEDICINE CLINIC | Facility: CLINIC | Age: 73
End: 2018-12-21
Payer: MEDICARE

## 2018-12-21 VITALS
OXYGEN SATURATION: 97 % | WEIGHT: 185.6 LBS | BODY MASS INDEX: 25.98 KG/M2 | DIASTOLIC BLOOD PRESSURE: 78 MMHG | HEIGHT: 71 IN | SYSTOLIC BLOOD PRESSURE: 128 MMHG | TEMPERATURE: 97.3 F | RESPIRATION RATE: 14 BRPM | HEART RATE: 98 BPM

## 2018-12-21 DIAGNOSIS — E78.00 PURE HYPERCHOLESTEROLEMIA: ICD-10-CM

## 2018-12-21 DIAGNOSIS — I25.10 CAD IN NATIVE ARTERY: ICD-10-CM

## 2018-12-21 DIAGNOSIS — E78.00 HYPERCHOLESTEROLEMIA: ICD-10-CM

## 2018-12-21 DIAGNOSIS — E74.39 GLUCOSE INTOLERANCE: ICD-10-CM

## 2018-12-21 DIAGNOSIS — N30.01 ACUTE CYSTITIS WITH HEMATURIA: Primary | ICD-10-CM

## 2018-12-21 DIAGNOSIS — E78.1 HYPERTRIGLYCERIDEMIA: ICD-10-CM

## 2018-12-21 DIAGNOSIS — I47.1 SUPRAVENTRICULAR TACHYCARDIA (HCC): ICD-10-CM

## 2018-12-21 DIAGNOSIS — I10 ESSENTIAL HYPERTENSION: ICD-10-CM

## 2018-12-21 PROCEDURE — 99215 OFFICE O/P EST HI 40 MIN: CPT | Performed by: INTERNAL MEDICINE

## 2018-12-21 RX ORDER — CIPROFLOXACIN 500 MG/1
500 TABLET, FILM COATED ORAL
COMMUNITY
Start: 2018-12-12 | End: 2018-12-22

## 2018-12-21 NOTE — ASSESSMENT & PLAN NOTE
Blood pressure control initially blood pressure was elevated on initial presentation today after talking for several minutes the patient's blood pressure was recheck and was found to be in acceptable range  In view of this we advised him to continue on his diltiazem and lisinopril medications  A follow-up assessment of his blood pressure is requested for 4 months

## 2018-12-21 NOTE — PROGRESS NOTES
Assessment/Plan:    CAD in native artery  Coronary disease the patient denies any chest pains palpitations or shortness of breath  He continues on aspirin as well as statin medications  He continues with regular visits with his cardiologist     Essential hypertension  Blood pressure control initially blood pressure was elevated on initial presentation today after talking for several minutes the patient's blood pressure was recheck and was found to be in acceptable range  In view of this we advised him to continue on his diltiazem and lisinopril medications  A follow-up assessment of his blood pressure is requested for 4 months  Supraventricular tachycardia (HCC)  History of supraventricular tachycardia the patient denies any tell palpitations or lightheadedness  Continue on diltiazem  20 mg daily  Glucose intolerance  History of mild glucose intolerance is noted most recent blood sugars under 100  He is encouraged to continue to maintain is healthy weight as well as regular walking exercise and care in consumption of carbohydrates and concentrated sugars a follow-up test will be obtained in 4 months  Hypercholesterolemia  Hyperlipidemia profile reviewed with the patient today of we would like him to continue with a careful diet regular exercise and atorvastatin 40 mg daily with a follow-up study in four months  Hypertriglyceridemia  Hypertriglyceridemia history is noted recommend continuation of careful diet reduction and carbohydrate consumption  Acute cystitis with hematuria  Reviewed the patient's recent hospitalization for acute urinary tract infection  He was treated with intravenous antibiotic therapy during his hospitalization and converted to oral antibiotics for completion of his therapy  He has several more days of Cipro to take to complete his full treatment  Asked him to have a urine culture performed 2 days after completion of his last day 2 of Cipro         Diagnoses and all orders for this visit:    Acute cystitis with hematuria  -     UA w Reflex to Microscopic w Reflex to Culture -Lab Collect    Pure hypercholesterolemia  -     Lipid panel; Future    Essential hypertension  -     Comprehensive metabolic panel; Future    Supraventricular tachycardia (HCC)    CAD in native artery    Hypercholesterolemia    Hypertriglyceridemia    Glucose intolerance    Other orders  -     ciprofloxacin (CIPRO) 500 mg tablet; Take 500 mg by mouth  -     Multiple Vitamins-Minerals (CENTRUM SILVER PO); Take by mouth        Subjective:      Patient ID: Jairo Preston  is a 74 y o  male  This 49-year-old gentleman presents today for routine 4 month follow-up visit  Since his last visit with us he was hospitalized for urinary tract infection requiring intravenous antibiotic therapy  He is completing is follow-up oral antibiotic therapy at this time and he indicates that his symptoms have improved significantly  His urine is now clear any has no discomfort or increased frequency  We provided him with a request for a follow-up urinalysis to ensure complete resolution of his urinary tract infection  The patient has a history of coronary artery disease and has had no chest pain palpitations or shortness of breath since his last visit with us  He also has a history of mild glucose intolerance as well as cholesterol elevation and triglyceride elevation blood work was reviewed in detail with the patient today  The following portions of the patient's history were reviewed and updated as appropriate: allergies, current medications, past family history, past medical history, past social history, past surgical history and problem list     Review of Systems   All other systems reviewed and are negative          Objective:      /78   Pulse 98   Temp (!) 97 3 °F (36 3 °C)   Resp 14   Ht 5' 11" (1 803 m)   Wt 84 2 kg (185 lb 9 6 oz)   SpO2 97%   BMI 25 89 kg/m²          Physical Exam Constitutional: He is oriented to person, place, and time  He appears well-developed and well-nourished  No distress  HENT:   Right Ear: Hearing, tympanic membrane, external ear and ear canal normal    Left Ear: Hearing, tympanic membrane, external ear and ear canal normal    Nose: Nose normal    Mouth/Throat: Oropharynx is clear and moist and mucous membranes are normal    Eyes: Pupils are equal, round, and reactive to light  Conjunctivae are normal    Neck: No thyromegaly present  Cardiovascular: Normal rate, regular rhythm, S1 normal, S2 normal, normal heart sounds and intact distal pulses  No murmur heard  Pulmonary/Chest: Effort normal and breath sounds normal  No respiratory distress  He has no wheezes  He has no rales  He exhibits no tenderness  Abdominal: Soft  Bowel sounds are normal  He exhibits no distension  There is no tenderness  Musculoskeletal: Normal range of motion  He exhibits no edema  Lymphadenopathy:     He has no cervical adenopathy  Neurological: He is alert and oriented to person, place, and time  He has normal reflexes  Skin: Skin is warm and dry  No rash noted  He is not diaphoretic  No erythema  No pallor  Psychiatric: He has a normal mood and affect   His behavior is normal  Judgment and thought content normal

## 2018-12-21 NOTE — ASSESSMENT & PLAN NOTE
History of mild glucose intolerance is noted most recent blood sugars under 100  He is encouraged to continue to maintain is healthy weight as well as regular walking exercise and care in consumption of carbohydrates and concentrated sugars a follow-up test will be obtained in 4 months

## 2018-12-21 NOTE — ASSESSMENT & PLAN NOTE
Reviewed the patient's recent hospitalization for acute urinary tract infection  He was treated with intravenous antibiotic therapy during his hospitalization and converted to oral antibiotics for completion of his therapy  He has several more days of Cipro to take to complete his full treatment  Asked him to have a urine culture performed 2 days after completion of his last day 2 of Cipro

## 2018-12-21 NOTE — ASSESSMENT & PLAN NOTE
Hypertriglyceridemia history is noted recommend continuation of careful diet reduction and carbohydrate consumption

## 2018-12-21 NOTE — ASSESSMENT & PLAN NOTE
Coronary disease the patient denies any chest pains palpitations or shortness of breath  He continues on aspirin as well as statin medications    He continues with regular visits with his cardiologist

## 2018-12-21 NOTE — ASSESSMENT & PLAN NOTE
Hyperlipidemia profile reviewed with the patient today of we would like him to continue with a careful diet regular exercise and atorvastatin 40 mg daily with a follow-up study in four months

## 2018-12-21 NOTE — ASSESSMENT & PLAN NOTE
History of supraventricular tachycardia the patient denies any tell palpitations or lightheadedness  Continue on diltiazem  20 mg daily

## 2018-12-26 ENCOUNTER — APPOINTMENT (OUTPATIENT)
Dept: LAB | Age: 73
End: 2018-12-26
Payer: MEDICARE

## 2018-12-26 LAB
BILIRUB UR QL STRIP: NEGATIVE
CLARITY UR: CLEAR
COLOR UR: YELLOW
GLUCOSE UR STRIP-MCNC: NEGATIVE MG/DL
HGB UR QL STRIP.AUTO: NEGATIVE
KETONES UR STRIP-MCNC: NEGATIVE MG/DL
LEUKOCYTE ESTERASE UR QL STRIP: NEGATIVE
NITRITE UR QL STRIP: NEGATIVE
PH UR STRIP.AUTO: 6 [PH] (ref 4.5–8)
PROT UR STRIP-MCNC: NEGATIVE MG/DL
SP GR UR STRIP.AUTO: 1.02 (ref 1–1.03)
UROBILINOGEN UR QL STRIP.AUTO: 0.2 E.U./DL

## 2018-12-26 PROCEDURE — 81003 URINALYSIS AUTO W/O SCOPE: CPT | Performed by: INTERNAL MEDICINE

## 2019-04-05 ENCOUNTER — APPOINTMENT (OUTPATIENT)
Dept: LAB | Age: 74
End: 2019-04-05
Payer: MEDICARE

## 2019-04-05 DIAGNOSIS — I10 ESSENTIAL HYPERTENSION: ICD-10-CM

## 2019-04-05 DIAGNOSIS — E78.00 PURE HYPERCHOLESTEROLEMIA: ICD-10-CM

## 2019-04-05 LAB
ALBUMIN SERPL BCP-MCNC: 3.6 G/DL (ref 3.5–5)
ALP SERPL-CCNC: 105 U/L (ref 46–116)
ALT SERPL W P-5'-P-CCNC: 36 U/L (ref 12–78)
ANION GAP SERPL CALCULATED.3IONS-SCNC: 7 MMOL/L (ref 4–13)
AST SERPL W P-5'-P-CCNC: 29 U/L (ref 5–45)
BILIRUB SERPL-MCNC: 1.06 MG/DL (ref 0.2–1)
BUN SERPL-MCNC: 19 MG/DL (ref 5–25)
CALCIUM SERPL-MCNC: 9.2 MG/DL (ref 8.3–10.1)
CHLORIDE SERPL-SCNC: 106 MMOL/L (ref 100–108)
CHOLEST SERPL-MCNC: 160 MG/DL (ref 50–200)
CO2 SERPL-SCNC: 28 MMOL/L (ref 21–32)
CREAT SERPL-MCNC: 1.16 MG/DL (ref 0.6–1.3)
GFR SERPL CREATININE-BSD FRML MDRD: 61 ML/MIN/1.73SQ M
GLUCOSE P FAST SERPL-MCNC: 93 MG/DL (ref 65–99)
HDLC SERPL-MCNC: 38 MG/DL (ref 40–60)
LDLC SERPL CALC-MCNC: 103 MG/DL (ref 0–100)
NONHDLC SERPL-MCNC: 122 MG/DL
POTASSIUM SERPL-SCNC: 4.1 MMOL/L (ref 3.5–5.3)
PROT SERPL-MCNC: 7.5 G/DL (ref 6.4–8.2)
SODIUM SERPL-SCNC: 141 MMOL/L (ref 136–145)
TRIGL SERPL-MCNC: 93 MG/DL

## 2019-04-05 PROCEDURE — 80061 LIPID PANEL: CPT

## 2019-04-05 PROCEDURE — 80053 COMPREHEN METABOLIC PANEL: CPT

## 2019-04-05 PROCEDURE — 36415 COLL VENOUS BLD VENIPUNCTURE: CPT

## 2019-04-15 ENCOUNTER — OFFICE VISIT (OUTPATIENT)
Dept: INTERNAL MEDICINE CLINIC | Facility: CLINIC | Age: 74
End: 2019-04-15
Payer: MEDICARE

## 2019-04-15 VITALS
OXYGEN SATURATION: 95 % | TEMPERATURE: 97.9 F | DIASTOLIC BLOOD PRESSURE: 70 MMHG | SYSTOLIC BLOOD PRESSURE: 118 MMHG | HEART RATE: 81 BPM | WEIGHT: 187.2 LBS | HEIGHT: 71 IN | RESPIRATION RATE: 14 BRPM | BODY MASS INDEX: 26.21 KG/M2

## 2019-04-15 DIAGNOSIS — I47.1 SUPRAVENTRICULAR TACHYCARDIA (HCC): ICD-10-CM

## 2019-04-15 DIAGNOSIS — R73.09 ABNORMAL GLUCOSE: ICD-10-CM

## 2019-04-15 DIAGNOSIS — E78.00 HYPERCHOLESTEROLEMIA: ICD-10-CM

## 2019-04-15 DIAGNOSIS — I10 ESSENTIAL HYPERTENSION: ICD-10-CM

## 2019-04-15 DIAGNOSIS — E78.1 HYPERTRIGLYCERIDEMIA: ICD-10-CM

## 2019-04-15 DIAGNOSIS — G47.33 OSA (OBSTRUCTIVE SLEEP APNEA): ICD-10-CM

## 2019-04-15 DIAGNOSIS — I25.10 CAD IN NATIVE ARTERY: ICD-10-CM

## 2019-04-15 DIAGNOSIS — E66.3 OVER WEIGHT: ICD-10-CM

## 2019-04-15 DIAGNOSIS — Z12.5 PROSTATE CANCER SCREENING: ICD-10-CM

## 2019-04-15 DIAGNOSIS — Z00.00 HEALTHCARE MAINTENANCE: ICD-10-CM

## 2019-04-15 DIAGNOSIS — B36.9 FUNGAL SKIN INFECTION: ICD-10-CM

## 2019-04-15 DIAGNOSIS — J98.4 RESTRICTIVE LUNG DISEASE: ICD-10-CM

## 2019-04-15 DIAGNOSIS — Z12.11 COLON CANCER SCREENING: Primary | ICD-10-CM

## 2019-04-15 PROBLEM — N30.01 ACUTE CYSTITIS WITH HEMATURIA: Status: RESOLVED | Noted: 2018-12-21 | Resolved: 2019-04-15

## 2019-04-15 PROCEDURE — 99215 OFFICE O/P EST HI 40 MIN: CPT | Performed by: INTERNAL MEDICINE

## 2019-04-15 PROCEDURE — 93000 ELECTROCARDIOGRAM COMPLETE: CPT | Performed by: INTERNAL MEDICINE

## 2019-04-15 PROCEDURE — G0439 PPPS, SUBSEQ VISIT: HCPCS | Performed by: INTERNAL MEDICINE

## 2019-04-15 RX ORDER — LISINOPRIL 20 MG/1
TABLET ORAL
COMMUNITY
Start: 2019-01-20 | End: 2022-01-18

## 2019-04-15 RX ORDER — NYSTATIN 100000 [USP'U]/G
POWDER TOPICAL 2 TIMES DAILY
Qty: 30 G | Refills: 3 | Status: SHIPPED | OUTPATIENT
Start: 2019-04-15 | End: 2022-01-11

## 2019-04-15 RX ORDER — DILTIAZEM HYDROCHLORIDE 120 MG/1
120 CAPSULE, COATED, EXTENDED RELEASE ORAL
COMMUNITY

## 2019-05-06 ENCOUNTER — APPOINTMENT (OUTPATIENT)
Dept: LAB | Age: 74
End: 2019-05-06
Payer: MEDICARE

## 2019-05-06 DIAGNOSIS — Z12.11 COLON CANCER SCREENING: ICD-10-CM

## 2019-05-06 LAB — HEMOCCULT STL QL IA: NEGATIVE

## 2019-05-06 PROCEDURE — G0328 FECAL BLOOD SCRN IMMUNOASSAY: HCPCS

## 2019-07-01 DIAGNOSIS — E78.5 HYPERLIPIDEMIA, UNSPECIFIED HYPERLIPIDEMIA TYPE: ICD-10-CM

## 2019-07-02 RX ORDER — ATORVASTATIN CALCIUM 40 MG/1
TABLET, FILM COATED ORAL
Qty: 90 TABLET | Refills: 2 | Status: SHIPPED | OUTPATIENT
Start: 2019-07-02 | End: 2019-12-10 | Stop reason: ALTCHOICE

## 2019-08-04 ENCOUNTER — APPOINTMENT (OUTPATIENT)
Dept: LAB | Age: 74
End: 2019-08-04
Payer: MEDICARE

## 2019-08-04 DIAGNOSIS — Z12.5 PROSTATE CANCER SCREENING: ICD-10-CM

## 2019-08-04 DIAGNOSIS — E78.00 HYPERCHOLESTEROLEMIA: ICD-10-CM

## 2019-08-04 LAB
CHOLEST SERPL-MCNC: 143 MG/DL (ref 50–200)
HDLC SERPL-MCNC: 38 MG/DL (ref 40–60)
LDLC SERPL CALC-MCNC: 84 MG/DL (ref 0–100)
NONHDLC SERPL-MCNC: 105 MG/DL
PSA SERPL-MCNC: 1.4 NG/ML (ref 0–4)
TRIGL SERPL-MCNC: 106 MG/DL

## 2019-08-04 PROCEDURE — 80061 LIPID PANEL: CPT

## 2019-08-04 PROCEDURE — G0103 PSA SCREENING: HCPCS

## 2019-08-04 PROCEDURE — 36415 COLL VENOUS BLD VENIPUNCTURE: CPT

## 2019-08-15 ENCOUNTER — OFFICE VISIT (OUTPATIENT)
Dept: INTERNAL MEDICINE CLINIC | Facility: CLINIC | Age: 74
End: 2019-08-15
Payer: MEDICARE

## 2019-08-15 VITALS
DIASTOLIC BLOOD PRESSURE: 58 MMHG | HEART RATE: 76 BPM | WEIGHT: 181.4 LBS | TEMPERATURE: 98 F | OXYGEN SATURATION: 96 % | HEIGHT: 71 IN | SYSTOLIC BLOOD PRESSURE: 118 MMHG | BODY MASS INDEX: 25.4 KG/M2

## 2019-08-15 DIAGNOSIS — E78.1 HYPERTRIGLYCERIDEMIA: ICD-10-CM

## 2019-08-15 DIAGNOSIS — R73.09 ABNORMAL GLUCOSE: ICD-10-CM

## 2019-08-15 DIAGNOSIS — I25.10 CAD IN NATIVE ARTERY: ICD-10-CM

## 2019-08-15 DIAGNOSIS — E78.00 HYPERCHOLESTEROLEMIA: ICD-10-CM

## 2019-08-15 DIAGNOSIS — I10 ESSENTIAL HYPERTENSION: ICD-10-CM

## 2019-08-15 DIAGNOSIS — Z12.11 COLON CANCER SCREENING: Primary | ICD-10-CM

## 2019-08-15 PROBLEM — B36.9 FUNGAL SKIN INFECTION: Status: RESOLVED | Noted: 2019-04-15 | Resolved: 2019-08-15

## 2019-08-15 PROCEDURE — 99214 OFFICE O/P EST MOD 30 MIN: CPT | Performed by: INTERNAL MEDICINE

## 2019-08-15 NOTE — ASSESSMENT & PLAN NOTE
History of mild glucose elevation most recent test was normal request follow-up glucose in 4 months avoid concentrated sugars as been recommended

## 2019-08-15 NOTE — ASSESSMENT & PLAN NOTE
Blood pressure was assessed during today's visit we find blood pressure control to be satisfactory with a reading 118/58  I recommend the continuation of his present diltiazem 120 mg q24 hours and lisinopril 20 mg daily  He has no apparent side effects of these medications and has no symptoms of uncontrolled hypertension or hypotension a follow-up assessment of his hypertension is requested in 4 months

## 2019-08-15 NOTE — ASSESSMENT & PLAN NOTE
Lipid profile was reviewed with the patient today is requested that he follow a low-cholesterol diet along with continue on his atorvastatin at 40 mg daily  Follow-up testing is requested in 4 months

## 2019-08-15 NOTE — ASSESSMENT & PLAN NOTE
Coronary artery disease appears to be stable and he reports no symptoms of chest pain palpitations or shortness of breath  He is encouraged to continue his 81 mg of aspirin daily along with his statin medication 40 mg daily he has a prescription for sublingual nitroglycerin if he needs it and understands the proper use of the medication

## 2019-08-15 NOTE — ASSESSMENT & PLAN NOTE
Lipid profile was reviewed with the patient today including his triglyceride readings he is encouraged to continue with a low triglyceride diet as well as his atorvastatin 40 mg daily follow-up testing for his triglycerides is requested for 4 months

## 2019-08-15 NOTE — PROGRESS NOTES
Assessment/Plan:    Essential hypertension  Blood pressure was assessed during today's visit we find blood pressure control to be satisfactory with a reading 118/58  I recommend the continuation of his present diltiazem 120 mg q24 hours and lisinopril 20 mg daily  He has no apparent side effects of these medications and has no symptoms of uncontrolled hypertension or hypotension a follow-up assessment of his hypertension is requested in 4 months  CAD in native artery  Coronary artery disease appears to be stable and he reports no symptoms of chest pain palpitations or shortness of breath  He is encouraged to continue his 81 mg of aspirin daily along with his statin medication 40 mg daily he has a prescription for sublingual nitroglycerin if he needs it and understands the proper use of the medication  Hypertriglyceridemia  Lipid profile was reviewed with the patient today including his triglyceride readings he is encouraged to continue with a low triglyceride diet as well as his atorvastatin 40 mg daily follow-up testing for his triglycerides is requested for 4 months  Hypercholesterolemia  Lipid profile was reviewed with the patient today is requested that he follow a low-cholesterol diet along with continue on his atorvastatin at 40 mg daily  Follow-up testing is requested in 4 months  Abnormal glucose  History of mild glucose elevation most recent test was normal request follow-up glucose in 4 months avoid concentrated sugars as been recommended       Diagnoses and all orders for this visit:    Colon cancer screening    Essential hypertension  -     Comprehensive metabolic panel; Future    Hypercholesterolemia  -     Lipid panel; Future    CAD in native artery    Hypertriglyceridemia    Other orders  -     Cancel: Ambulatory referral to Gastroenterology; Future        Subjective:      Patient ID: Radha Rg  is a 74 y o  male      This is a routine visit for this 78-year-old gentleman with a history of heart disease hyperlipidemia abnormal glucose  He has had no symptoms or problems since his last visit  He denies any chest pains palpitations or shortness of breath  He did have an at discussion today about the situation with his wife at home who has advancing dementia and the stresses this is placing on the patient  The following portions of the patient's history were reviewed and updated as appropriate:   He  has a past medical history of Elevated liver enzymes  He   Patient Active Problem List    Diagnosis Date Noted    Supraventricular tachycardia ([facility] [address] ) 12/19/2018    Renal insufficiency 07/31/2018    Essential hypertension 04/11/2018    Abnormal glucose 04/11/2018    Over weight 04/11/2018    Anticoagulant long-term use 10/11/2017    ED (erectile dysfunction) 08/10/2017    Hypercholesterolemia 08/10/2017    Hypertriglyceridemia 09/12/2016    Restrictive lung disease 09/12/2016    CAD in native artery 05/12/2016    TIA (transient ischemic attack) 02/24/2016    Lumbar canal stenosis 09/13/2013     He  has no past surgical history on file  His family history includes Heart failure in his father and mother; Stroke in his brother  He  reports that he has never smoked  He has never used smokeless tobacco  His alcohol and drug histories are not on file    Current Outpatient Medications   Medication Sig Dispense Refill    aspirin 81 MG tablet Take 81 mg by mouth      ASPIRIN 81 PO Take 81 mg by mouth      atorvastatin (LIPITOR) 40 mg tablet TAKE 1 TABLET BY MOUTH  DAILY 90 tablet 2    celecoxib (CeleBREX) 200 mg capsule Take 200 mg by mouth daily With a meal  3    diltiazem (DILTIAZEM CD) 120 mg 24 hr capsule Take 120 mg by mouth      lisinopril (ZESTRIL) 20 mg tablet       Multiple Vitamins-Minerals (CENTRUM SILVER PO) Take by mouth      nitroglycerin (NITROSTAT) 0 4 mg SL tablet Place 1 tablet under the tongue every 5 (five) minutes as needed      nystatin (MYCOSTATIN) powder Apply topically 2 (two) times a day 30 g 3    Omega-3 Fatty Acids (FISH OIL) 1200 MG CAPS Take by mouth      sildenafil (VIAGRA) 100 mg tablet Take 100 mg by mouth      warfarin (COUMADIN) 5 mg tablet Take 5 mg by mouth       No current facility-administered medications for this visit       Review of Systems   All other systems reviewed and are negative  Objective:      /58   Pulse 76   Temp 98 °F (36 7 °C)   Ht 5' 11" (1 803 m)   Wt 82 3 kg (181 lb 6 4 oz)   SpO2 96%   BMI 25 30 kg/m²          Physical Exam   Constitutional: He is oriented to person, place, and time  He appears well-developed and well-nourished  HENT:   Right Ear: Hearing, tympanic membrane, external ear and ear canal normal    Left Ear: Hearing, tympanic membrane, external ear and ear canal normal    Nose: Nose normal    Mouth/Throat: Oropharynx is clear and moist and mucous membranes are normal    Eyes: Pupils are equal, round, and reactive to light  Conjunctivae are normal  Right eye exhibits no discharge  No scleral icterus  Neck: No thyromegaly present  Cardiovascular: Normal rate, regular rhythm, S1 normal, S2 normal, normal heart sounds and intact distal pulses  No murmur heard  Pulmonary/Chest: Effort normal and breath sounds normal  No stridor  No respiratory distress  He has no wheezes  Abdominal: Soft  Bowel sounds are normal    Musculoskeletal: Normal range of motion  He exhibits no edema  Lymphadenopathy:     He has no cervical adenopathy  Neurological: He is alert and oriented to person, place, and time  He has normal reflexes  He displays normal reflexes  Skin: Skin is warm and dry  Psychiatric: He has a normal mood and affect   His behavior is normal  Judgment and thought content normal

## 2019-10-29 ENCOUNTER — APPOINTMENT (OUTPATIENT)
Dept: LAB | Age: 74
End: 2019-10-29
Payer: MEDICARE

## 2019-10-29 ENCOUNTER — TRANSCRIBE ORDERS (OUTPATIENT)
Dept: ADMINISTRATIVE | Age: 74
End: 2019-10-29

## 2019-10-29 DIAGNOSIS — I25.119 ATHEROSCLEROSIS OF NATIVE CORONARY ARTERY WITH ANGINA PECTORIS, UNSPECIFIED WHETHER NATIVE OR TRANSPLANTED HEART (HCC): Primary | ICD-10-CM

## 2019-10-29 DIAGNOSIS — I10 ESSENTIAL HYPERTENSION, MALIGNANT: ICD-10-CM

## 2019-10-29 DIAGNOSIS — Z79.899 ENCOUNTER FOR LONG-TERM (CURRENT) USE OF OTHER MEDICATIONS: ICD-10-CM

## 2019-10-29 DIAGNOSIS — I25.119 ATHEROSCLEROSIS OF NATIVE CORONARY ARTERY WITH ANGINA PECTORIS, UNSPECIFIED WHETHER NATIVE OR TRANSPLANTED HEART (HCC): ICD-10-CM

## 2019-10-29 LAB
ANION GAP SERPL CALCULATED.3IONS-SCNC: 5 MMOL/L (ref 4–13)
BUN SERPL-MCNC: 12 MG/DL (ref 5–25)
CALCIUM SERPL-MCNC: 9.2 MG/DL (ref 8.3–10.1)
CHLORIDE SERPL-SCNC: 109 MMOL/L (ref 100–108)
CO2 SERPL-SCNC: 28 MMOL/L (ref 21–32)
CREAT SERPL-MCNC: 1.04 MG/DL (ref 0.6–1.3)
GFR SERPL CREATININE-BSD FRML MDRD: 69 ML/MIN/1.73SQ M
GLUCOSE SERPL-MCNC: 77 MG/DL (ref 65–140)
POTASSIUM SERPL-SCNC: 4.2 MMOL/L (ref 3.5–5.3)
SODIUM SERPL-SCNC: 142 MMOL/L (ref 136–145)

## 2019-10-29 PROCEDURE — 80048 BASIC METABOLIC PNL TOTAL CA: CPT

## 2019-10-29 PROCEDURE — 36415 COLL VENOUS BLD VENIPUNCTURE: CPT

## 2019-12-01 ENCOUNTER — APPOINTMENT (OUTPATIENT)
Dept: LAB | Age: 74
End: 2019-12-01
Payer: MEDICARE

## 2019-12-01 ENCOUNTER — TRANSCRIBE ORDERS (OUTPATIENT)
Dept: ADMINISTRATIVE | Age: 74
End: 2019-12-01

## 2019-12-01 DIAGNOSIS — E78.00 HYPERCHOLESTEROLEMIA: ICD-10-CM

## 2019-12-01 DIAGNOSIS — I25.119 ATHEROSCLEROSIS OF NATIVE CORONARY ARTERY OF NATIVE HEART WITH ANGINA PECTORIS (HCC): Primary | ICD-10-CM

## 2019-12-01 DIAGNOSIS — Z79.899 ON STATIN THERAPY: ICD-10-CM

## 2019-12-01 DIAGNOSIS — I10 ESSENTIAL HYPERTENSION, BENIGN: ICD-10-CM

## 2019-12-01 DIAGNOSIS — E78.00 HYPERCHOLESTEREMIA: ICD-10-CM

## 2019-12-01 DIAGNOSIS — I25.119 ATHEROSCLEROSIS OF NATIVE CORONARY ARTERY OF NATIVE HEART WITH ANGINA PECTORIS (HCC): ICD-10-CM

## 2019-12-01 DIAGNOSIS — Z79.899 ENCOUNTER FOR LONG-TERM (CURRENT) USE OF OTHER MEDICATIONS: ICD-10-CM

## 2019-12-01 LAB
ALBUMIN SERPL BCP-MCNC: 3.5 G/DL (ref 3.5–5)
ALP SERPL-CCNC: 81 U/L (ref 46–116)
ALT SERPL W P-5'-P-CCNC: 40 U/L (ref 12–78)
ANION GAP SERPL CALCULATED.3IONS-SCNC: 2 MMOL/L (ref 4–13)
AST SERPL W P-5'-P-CCNC: 27 U/L (ref 5–45)
BACTERIA UR QL AUTO: NORMAL /HPF
BASOPHILS # BLD AUTO: 0.03 THOUSANDS/ΜL (ref 0–0.1)
BASOPHILS NFR BLD AUTO: 1 % (ref 0–1)
BILIRUB SERPL-MCNC: 0.52 MG/DL (ref 0.2–1)
BILIRUB UR QL STRIP: NEGATIVE
BUN SERPL-MCNC: 13 MG/DL (ref 5–25)
CALCIUM SERPL-MCNC: 9.1 MG/DL (ref 8.3–10.1)
CHLORIDE SERPL-SCNC: 111 MMOL/L (ref 100–108)
CHOLEST SERPL-MCNC: 143 MG/DL (ref 50–200)
CLARITY UR: CLEAR
CO2 SERPL-SCNC: 29 MMOL/L (ref 21–32)
COLOR UR: YELLOW
CREAT SERPL-MCNC: 1.05 MG/DL (ref 0.6–1.3)
CREAT UR-MCNC: 128 MG/DL
EOSINOPHIL # BLD AUTO: 0.2 THOUSAND/ΜL (ref 0–0.61)
EOSINOPHIL NFR BLD AUTO: 3 % (ref 0–6)
ERYTHROCYTE [DISTWIDTH] IN BLOOD BY AUTOMATED COUNT: 12.1 % (ref 11.6–15.1)
GFR SERPL CREATININE-BSD FRML MDRD: 69 ML/MIN/1.73SQ M
GLUCOSE P FAST SERPL-MCNC: 91 MG/DL (ref 65–99)
GLUCOSE UR STRIP-MCNC: NEGATIVE MG/DL
HCT VFR BLD AUTO: 47.1 % (ref 36.5–49.3)
HDLC SERPL-MCNC: 36 MG/DL
HGB BLD-MCNC: 15.7 G/DL (ref 12–17)
HGB UR QL STRIP.AUTO: NEGATIVE
HYALINE CASTS #/AREA URNS LPF: NORMAL /LPF
IMM GRANULOCYTES # BLD AUTO: 0.01 THOUSAND/UL (ref 0–0.2)
IMM GRANULOCYTES NFR BLD AUTO: 0 % (ref 0–2)
KETONES UR STRIP-MCNC: NEGATIVE MG/DL
LDLC SERPL CALC-MCNC: 75 MG/DL (ref 0–100)
LDLC SERPL DIRECT ASSAY-MCNC: 82 MG/DL (ref 0–100)
LEUKOCYTE ESTERASE UR QL STRIP: NEGATIVE
LYMPHOCYTES # BLD AUTO: 2.08 THOUSANDS/ΜL (ref 0.6–4.47)
LYMPHOCYTES NFR BLD AUTO: 35 % (ref 14–44)
MCH RBC QN AUTO: 32.2 PG (ref 26.8–34.3)
MCHC RBC AUTO-ENTMCNC: 33.3 G/DL (ref 31.4–37.4)
MCV RBC AUTO: 97 FL (ref 82–98)
MICROALBUMIN UR-MCNC: 9.5 MG/L (ref 0–20)
MICROALBUMIN/CREAT 24H UR: 7 MG/G CREATININE (ref 0–30)
MONOCYTES # BLD AUTO: 0.43 THOUSAND/ΜL (ref 0.17–1.22)
MONOCYTES NFR BLD AUTO: 7 % (ref 4–12)
NEUTROPHILS # BLD AUTO: 3.14 THOUSANDS/ΜL (ref 1.85–7.62)
NEUTS SEG NFR BLD AUTO: 54 % (ref 43–75)
NITRITE UR QL STRIP: NEGATIVE
NON-SQ EPI CELLS URNS QL MICRO: NORMAL /HPF
NONHDLC SERPL-MCNC: 107 MG/DL
NRBC BLD AUTO-RTO: 0 /100 WBCS
PH UR STRIP.AUTO: 6.5 [PH]
PLATELET # BLD AUTO: 211 THOUSANDS/UL (ref 149–390)
PMV BLD AUTO: 10 FL (ref 8.9–12.7)
POTASSIUM SERPL-SCNC: 4.1 MMOL/L (ref 3.5–5.3)
PROT SERPL-MCNC: 7.2 G/DL (ref 6.4–8.2)
PROT UR STRIP-MCNC: NEGATIVE MG/DL
RBC # BLD AUTO: 4.87 MILLION/UL (ref 3.88–5.62)
RBC #/AREA URNS AUTO: NORMAL /HPF
SODIUM SERPL-SCNC: 142 MMOL/L (ref 136–145)
SP GR UR STRIP.AUTO: 1.02 (ref 1–1.03)
TRIGL SERPL-MCNC: 158 MG/DL
UROBILINOGEN UR QL STRIP.AUTO: 0.2 E.U./DL
WBC # BLD AUTO: 5.89 THOUSAND/UL (ref 4.31–10.16)
WBC #/AREA URNS AUTO: NORMAL /HPF

## 2019-12-01 PROCEDURE — 36415 COLL VENOUS BLD VENIPUNCTURE: CPT

## 2019-12-01 PROCEDURE — 82570 ASSAY OF URINE CREATININE: CPT | Performed by: INTERNAL MEDICINE

## 2019-12-01 PROCEDURE — 81001 URINALYSIS AUTO W/SCOPE: CPT | Performed by: INTERNAL MEDICINE

## 2019-12-01 PROCEDURE — 80053 COMPREHEN METABOLIC PANEL: CPT

## 2019-12-01 PROCEDURE — 80061 LIPID PANEL: CPT

## 2019-12-01 PROCEDURE — 85025 COMPLETE CBC W/AUTO DIFF WBC: CPT

## 2019-12-01 PROCEDURE — 82043 UR ALBUMIN QUANTITATIVE: CPT | Performed by: INTERNAL MEDICINE

## 2019-12-01 PROCEDURE — 83721 ASSAY OF BLOOD LIPOPROTEIN: CPT

## 2019-12-10 ENCOUNTER — OFFICE VISIT (OUTPATIENT)
Dept: INTERNAL MEDICINE CLINIC | Facility: CLINIC | Age: 74
End: 2019-12-10
Payer: MEDICARE

## 2019-12-10 VITALS
TEMPERATURE: 97.3 F | HEIGHT: 71 IN | SYSTOLIC BLOOD PRESSURE: 130 MMHG | BODY MASS INDEX: 26.26 KG/M2 | HEART RATE: 89 BPM | OXYGEN SATURATION: 96 % | WEIGHT: 187.6 LBS | RESPIRATION RATE: 14 BRPM | DIASTOLIC BLOOD PRESSURE: 76 MMHG

## 2019-12-10 DIAGNOSIS — I25.10 CAD IN NATIVE ARTERY: ICD-10-CM

## 2019-12-10 DIAGNOSIS — I10 ESSENTIAL HYPERTENSION: ICD-10-CM

## 2019-12-10 DIAGNOSIS — E78.1 HYPERTRIGLYCERIDEMIA: ICD-10-CM

## 2019-12-10 DIAGNOSIS — E78.00 HYPERCHOLESTEROLEMIA: ICD-10-CM

## 2019-12-10 DIAGNOSIS — F41.9 ANXIETY: ICD-10-CM

## 2019-12-10 DIAGNOSIS — R06.02 SOB (SHORTNESS OF BREATH): Primary | ICD-10-CM

## 2019-12-10 DIAGNOSIS — R73.09 ABNORMAL GLUCOSE: ICD-10-CM

## 2019-12-10 PROCEDURE — 99215 OFFICE O/P EST HI 40 MIN: CPT | Performed by: INTERNAL MEDICINE

## 2019-12-10 RX ORDER — ROSUVASTATIN CALCIUM 20 MG/1
TABLET, COATED ORAL
Refills: 3 | COMMUNITY
Start: 2019-11-25 | End: 2019-12-10 | Stop reason: SDUPTHER

## 2019-12-10 RX ORDER — ROSUVASTATIN CALCIUM 20 MG/1
20 TABLET, COATED ORAL DAILY
Qty: 90 TABLET | Refills: 3 | Status: SHIPPED | OUTPATIENT
Start: 2019-12-10 | End: 2020-10-20

## 2019-12-10 RX ORDER — ESCITALOPRAM OXALATE 10 MG/1
10 TABLET ORAL DAILY
Qty: 90 TABLET | Refills: 2 | Status: SHIPPED | OUTPATIENT
Start: 2019-12-10 | End: 2020-09-02

## 2019-12-11 NOTE — PROGRESS NOTES
Assessment/Plan:    Essential hypertension  Evaluation the patient's hypertension today reveals initial elevation in blood pressure and subsequent reading down to 130/76  Recommend that he continue his present lisinopril at 20 mg daily along with his diltiazem 120 mg of sustained release  A follow-up assessment of his hypertension is requested within 4 months  He has no apparent side effects of his blood pressure medications  CAD in native artery  History of coronary artery disease in native arteries  The patient denies any recent chest pains or palpitations  He does have shortness of breath on physical exertion relieved with rest   Are times when he also experiences some shortness of breath just at rest when he talks for an extended period of time  Cardiology is evaluating does not believe that his shortness of breath is related to his cardiac disease at this time he continues on aspirin as well as statin medication  SOB (shortness of breath)  Shortness of breath does not appear to be related to anemia or cardiac disease  Patient will have a high-resolution CT scan of his chest as well as a pulmonary function test and consultation with Pulmonary services at [facility] for further evaluation  Hypertriglyceridemia  We reviewed with the patient today is triglyceride value which has been increasing since his last evaluation 4 months ago  Reviewed with the patient dietary adjustments to be made to improve his triglyceride control  Recommend continued low starch and sugar diet and continuation of present statin therapy with follow-up testing in 4 months    Hypercholesterolemia  The patient's lipid profile was reviewed during today's value of visit    He continues to have good control of his cholesterol values recommend continued low-cholesterol diet along with continuation of his present statin therapy as no apparent side effects of the statin medication and a follow-up visit with testing is recommended in 4 months    Anxiety  Patient admits to increased anxiety symptoms due to his wife's advancing dementia and is on physical conditions  He is requesting a prescription to go back on Lexapro medication and he has previously been on an seem to help him in the past   Will start him at 10 mg of Lexapro daily with an option to increase the dose when we re-evaluate him in several weeks  Abnormal glucose  History of abnormal glucose most recent fasting blood sugar is less than 100 recommend continued efforts to be careful with concentrated carbohydrate and sugar content follow-up testing recommended in 4 months  Diagnoses and all orders for this visit:    SOB (shortness of breath)  -     Complete PFT with Post Bronchodilator and ABG; Future  -     CT chest high resolution; Future  -     Ambulatory referral to Pulmonology; Future    Anxiety  -     escitalopram (LEXAPRO) 10 mg tablet; Take 1 tablet (10 mg total) by mouth daily    Hypercholesterolemia  -     rosuvastatin (CRESTOR) 20 MG tablet; Take 1 tablet (20 mg total) by mouth daily    Abnormal glucose    Hypertriglyceridemia    Essential hypertension    Other orders  -     Discontinue: rosuvastatin (CRESTOR) 20 MG tablet        Subjective:      Patient ID: Pal Zhang  is a 66 y o  male  This 57-year-old gentleman presents today for routine follow-up visit  Recently had a discussion regarding this patient with his cardiologist   There been concerns about the patient's increasing symptoms of shortness of breath  After evaluation from a cardiac perspective the his cardiologist does not believe that his shortness of breath has anything to do with cardiac performance  The patient has undergone a CBC with a normal hemoglobin value a limiting possibility of anemia as a factor for his shortness of breath    We recommended the patient undergo a pulmonary function study as well as a high resolution CT scan of the chest to evaluate for possible interstitial lung disease  We have also range for this patient to have a formal consultation with a pulmonologist for further evaluation of his shortness of breath  This patient has 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  He grew up in a form environment with a lot of particulate matter in the area as well in both [place] as well as tossing hay  He finds that he does get short of breath on physical exertion or even talking for any extended period of time  He denies any recent chest pains or palpitation types of symptoms and does not have any significant peripheral edema symptoms  During the course of today's visit we also reviewed his most recent blood work evaluating his blood sugar cholesterol and triglycerides  A period of 45 minutes was spent with the patient today reviewing his symptoms discussing his questions and reviewing his blood work as well as physical examination      The following portions of the patient's history were reviewed and updated as appropriate:   He  has a past medical history of Elevated liver enzymes  He   Patient Active Problem List    Diagnosis Date Noted    SOB (shortness of breath) 12/10/2019    Anxiety 12/10/2019    Supraventricular tachycardia ([address] ) 12/19/2018    Renal insufficiency 07/31/2018    Essential hypertension 04/11/2018    Abnormal glucose 04/11/2018    Over weight 04/11/2018    Anticoagulant long-term use 10/11/2017    ED (erectile dysfunction) 08/10/2017    Hypercholesterolemia 08/10/2017    Hypertriglyceridemia 09/12/2016    Restrictive lung disease 09/12/2016    CAD in native artery 05/12/2016    TIA (transient ischemic attack) 02/24/2016    Lumbar canal stenosis 09/13/2013     He  has no past surgical history on file  His family history includes Heart failure in his father and mother; Stroke in his brother  He  reports that he has never smoked   He has never used smokeless tobacco  His alcohol and drug histories are not on file  Current Outpatient Medications   Medication Sig Dispense Refill    aspirin 81 MG tablet Take 81 mg by mouth      celecoxib (CeleBREX) 200 mg capsule Take 200 mg by mouth daily With a meal  3    diltiazem (DILTIAZEM CD) 120 mg 24 hr capsule Take 120 mg by mouth      lisinopril (ZESTRIL) 20 mg tablet       Multiple Vitamins-Minerals (CENTRUM SILVER PO) Take by mouth      nitroglycerin (NITROSTAT) 0 4 mg SL tablet Place 1 tablet under the tongue every 5 (five) minutes as needed      nystatin (MYCOSTATIN) powder Apply topically 2 (two) times a day 30 g 3    Omega-3 Fatty Acids (FISH OIL) 1200 MG CAPS Take by mouth      rosuvastatin (CRESTOR) 20 MG tablet Take 1 tablet (20 mg total) by mouth daily 90 tablet 3    sildenafil (VIAGRA) 100 mg tablet Take 100 mg by mouth      warfarin (COUMADIN) 5 mg tablet Take 5 mg by mouth      escitalopram (LEXAPRO) 10 mg tablet Take 1 tablet (10 mg total) by mouth daily 90 tablet 2     No current facility-administered medications for this visit       Review of Systems   Constitutional: Positive for fatigue  Respiratory: Positive for shortness of breath  Psychiatric/Behavioral: The patient is nervous/anxious  All other systems reviewed and are negative  Objective:      /76   Pulse 89   Temp (!) 97 3 °F (36 3 °C)   Resp 14   Ht 5' 11" (1 803 m)   Wt 85 1 kg (187 lb 9 6 oz)   SpO2 96%   BMI 26 16 kg/m²          Physical Exam   Constitutional: He is oriented to person, place, and time  He appears well-developed and well-nourished  No distress  HENT:   Right Ear: Hearing, tympanic membrane, external ear and ear canal normal    Left Ear: Hearing, tympanic membrane, external ear and ear canal normal    Nose: Nose normal    Mouth/Throat: Oropharynx is clear and moist and mucous membranes are normal    Eyes: Pupils are equal, round, and reactive to light   Conjunctivae are normal    Neck: No thyromegaly present  Cardiovascular: Normal rate, regular rhythm, S1 normal, S2 normal, normal heart sounds and intact distal pulses  No murmur heard  Pulmonary/Chest: Effort normal and breath sounds normal  No stridor  No respiratory distress  He has no wheezes  He has no rales  Abdominal: Soft  Bowel sounds are normal  There is no tenderness  Musculoskeletal: Normal range of motion  He exhibits no edema  Lymphadenopathy:     He has no cervical adenopathy  Neurological: He is alert and oriented to person, place, and time  He has normal reflexes  He displays normal reflexes  Skin: Skin is warm and dry  He is not diaphoretic  Psychiatric: He has a normal mood and affect   His behavior is normal  Judgment and thought content normal

## 2019-12-11 NOTE — ASSESSMENT & PLAN NOTE
We reviewed with the patient today is triglyceride value which has been increasing since his last evaluation 4 months ago  Reviewed with the patient dietary adjustments to be made to improve his triglyceride control    Recommend continued low starch and sugar diet and continuation of present statin therapy with follow-up testing in 4 months

## 2019-12-11 NOTE — ASSESSMENT & PLAN NOTE
Patient admits to increased anxiety symptoms due to his wife's advancing dementia and is on physical conditions  He is requesting a prescription to go back on Lexapro medication and he has previously been on an seem to help him in the past   Will start him at 10 mg of Lexapro daily with an option to increase the dose when we re-evaluate him in several weeks

## 2019-12-11 NOTE — ASSESSMENT & PLAN NOTE
History of abnormal glucose most recent fasting blood sugar is less than 100 recommend continued efforts to be careful with concentrated carbohydrate and sugar content follow-up testing recommended in 4 months

## 2019-12-11 NOTE — ASSESSMENT & PLAN NOTE
History of coronary artery disease in native arteries  The patient denies any recent chest pains or palpitations  He does have shortness of breath on physical exertion relieved with rest   Are times when he also experiences some shortness of breath just at rest when he talks for an extended period of time  Cardiology is evaluating does not believe that his shortness of breath is related to his cardiac disease at this time he continues on aspirin as well as statin medication

## 2019-12-11 NOTE — ASSESSMENT & PLAN NOTE
Shortness of breath does not appear to be related to anemia or cardiac disease  Patient will have a high-resolution CT scan of his chest as well as a pulmonary function test and consultation with Pulmonary services at [facility] for further evaluation

## 2019-12-11 NOTE — ASSESSMENT & PLAN NOTE
The patient's lipid profile was reviewed during today's value of visit    He continues to have good control of his cholesterol values recommend continued low-cholesterol diet along with continuation of his present statin therapy as no apparent side effects of the statin medication and a follow-up visit with testing is recommended in 4 months

## 2019-12-11 NOTE — ASSESSMENT & PLAN NOTE
Evaluation the patient's hypertension today reveals initial elevation in blood pressure and subsequent reading down to 130/76  Recommend that he continue his present lisinopril at 20 mg daily along with his diltiazem 120 mg of sustained release  A follow-up assessment of his hypertension is requested within 4 months  He has no apparent side effects of his blood pressure medications

## 2019-12-18 ENCOUNTER — HOSPITAL ENCOUNTER (OUTPATIENT)
Dept: PULMONOLOGY | Facility: HOSPITAL | Age: 74
Discharge: HOME | End: 2019-12-18
Attending: INTERNAL MEDICINE
Payer: MEDICARE

## 2019-12-18 ENCOUNTER — HOSPITAL ENCOUNTER (OUTPATIENT)
Dept: RADIOLOGY | Facility: HOSPITAL | Age: 74
Discharge: HOME | End: 2019-12-18
Attending: INTERNAL MEDICINE
Payer: MEDICARE

## 2019-12-18 DIAGNOSIS — R06.02 SOB (SHORTNESS OF BREATH): ICD-10-CM

## 2019-12-18 LAB
BASE EXCESS BLDA CALC-SCNC: 0 MMOL/L (ref -2–3)
CA-I BLD-SCNC: 1.26 MMOL/L (ref 1.12–1.32)
GLUCOSE SERPL-MCNC: 99 MG/DL (ref 65–140)
HCO3 BLDA-SCNC: 24.2 MMOL/L (ref 22–28)
HCT VFR BLD CALC: 46 % (ref 36.5–49.3)
HGB BLDA-MCNC: 15.6 G/DL (ref 12–17)
PCO2 BLD: 25 MMOL/L (ref 21–32)
PCO2 BLD: 35.4 MM HG (ref 36–44)
PH BLD: 7.44 [PH] (ref 7.35–7.45)
PO2 BLD: 72 MM HG (ref 75–129)
POTASSIUM BLD-SCNC: 3.7 MMOL/L (ref 3.5–5.3)
SAO2 % BLD FROM PO2: 95 % (ref 60–85)
SODIUM BLD-SCNC: 140 MMOL/L (ref 136–145)
SPECIMEN SOURCE: ABNORMAL

## 2019-12-18 PROCEDURE — 94060 EVALUATION OF WHEEZING: CPT | Performed by: INTERNAL MEDICINE

## 2019-12-18 PROCEDURE — 85014 HEMATOCRIT: CPT

## 2019-12-18 PROCEDURE — 36600 WITHDRAWAL OF ARTERIAL BLOOD: CPT

## 2019-12-18 PROCEDURE — 84295 ASSAY OF SERUM SODIUM: CPT

## 2019-12-18 PROCEDURE — 94729 DIFFUSING CAPACITY: CPT

## 2019-12-18 PROCEDURE — 71250 CT THORAX DX C-: CPT

## 2019-12-18 PROCEDURE — 82330 ASSAY OF CALCIUM: CPT

## 2019-12-18 PROCEDURE — 94729 DIFFUSING CAPACITY: CPT | Performed by: INTERNAL MEDICINE

## 2019-12-18 PROCEDURE — 84132 ASSAY OF SERUM POTASSIUM: CPT

## 2019-12-18 PROCEDURE — 94726 PLETHYSMOGRAPHY LUNG VOLUMES: CPT | Performed by: INTERNAL MEDICINE

## 2019-12-18 PROCEDURE — 82947 ASSAY GLUCOSE BLOOD QUANT: CPT

## 2019-12-18 PROCEDURE — 82803 BLOOD GASES ANY COMBINATION: CPT

## 2019-12-18 PROCEDURE — 94726 PLETHYSMOGRAPHY LUNG VOLUMES: CPT

## 2019-12-18 PROCEDURE — 94060 EVALUATION OF WHEEZING: CPT

## 2019-12-18 RX ORDER — ALBUTEROL SULFATE 2.5 MG/3ML
2.5 SOLUTION RESPIRATORY (INHALATION) ONCE
Status: DISCONTINUED | OUTPATIENT
Start: 2019-12-18 | End: 2019-12-22 | Stop reason: HOSPADM

## 2019-12-20 ENCOUNTER — OFFICE VISIT (OUTPATIENT)
Dept: PULMONOLOGY | Facility: CLINIC | Age: 74
End: 2019-12-20
Payer: MEDICARE

## 2019-12-20 VITALS
BODY MASS INDEX: 27.4 KG/M2 | WEIGHT: 185 LBS | SYSTOLIC BLOOD PRESSURE: 160 MMHG | OXYGEN SATURATION: 97 % | HEIGHT: 69 IN | DIASTOLIC BLOOD PRESSURE: 80 MMHG | TEMPERATURE: 98.1 F | HEART RATE: 70 BPM

## 2019-12-20 DIAGNOSIS — R06.83 SNORING: Primary | ICD-10-CM

## 2019-12-20 DIAGNOSIS — R06.02 SOB (SHORTNESS OF BREATH): ICD-10-CM

## 2019-12-20 PROCEDURE — 99204 OFFICE O/P NEW MOD 45 MIN: CPT | Performed by: INTERNAL MEDICINE

## 2019-12-20 PROCEDURE — 94618 PULMONARY STRESS TESTING: CPT | Performed by: INTERNAL MEDICINE

## 2019-12-20 NOTE — PROGRESS NOTES
Pulmonary Consultation   Jodi Rodriguez  66 y o  male MRN: [id number]  12/20/2019      Assessment:    Snoring  He has snoring and his wife notes she is concerned about his stopping breathing at night  He thinks he has undergone a sleep study previously and he is on nocturnal oxygen  I sent him for an at-home sleep study  Would proceed to in-lab split night if positive  SOB (shortness of breath)  He describes largely unchanging shortness of breath over years  Testing has revealed a continuously decreasing diffusion capacity over years  Isolated decreases in diffusion capacity usually either lend toward pulmonary hypertension or developing interstitial lung disease, but they can happen for no apparent reason as well  His formal CT scan reading is pending, but he does appear to have some L>R basilar scarring developing with some subpleural reticulation that persists on prone imaging by my interpretation  He has had echocardiograms but they are not within our system  We will request them and wait on his formal interpretation before deciding whether to get aggressive into looking into pulmonary hypertension or interstitial lung diseases  I told him I did not think he was likely to benefit from a rescue inhaler based on his testing, and he agrees  He had no desaturations on 6 minute walk testing  Plan:    Diagnoses and all orders for this visit:    Snoring  -     Home Study; Future    SOB (shortness of breath)  -     Ambulatory referral to Pulmonology  -     POCT 6 minute walk      Return in about 3 months (around 3/20/2020)  History of Present Illness   HPI:  Jodi Rodriguez  is a 66 y o  male who presents for evaluation of dyspnea on exertion  Briefly, he has a history of coronary artery disease with bypass performed 13 years ago, which was around the first time he started to deal with shortness of breath    He notes over that period of time he has had issues with things like climbing multiple flights of stairs, performing at work (he has worked in the [employer], [facility] [employer], etc ), and doing outdoor yard work where he becomes short of breath more easily than he would expect  He denies having a cough, and denies a progressive nature to his dyspnea  He has been seen by a Cardiologist and it is not felt that his dyspnea is related to his coronary disease, although he also notes there is some evidence he is starting to develop new blockages in his native vessels per his report  He has had multiple sets of pulmonary function tests which have shown progressively declining diffusion capacities, but they are otherwise normal (low normal for TLC, suggesting possible early restriction)  He denies chest pain, he has occasional chest tightness, but he does not wheeze  Again, he does not have a cough  He has a history of using nocturnal oxygen, but it is not clear if he has sleep apnea - he told me his Cardiologist started this to "rest his heart at night "  He does not have other known risk factors for pulmonary hypertension with the exception of any group II disease he may have from his ischemic heart disease  He denies Raynaud's phenomenon and has no other concerning signs for a developing connective tissue disease  His medical history is only significant for coronary artery disease status post bypass grafting in 2006  He has a history of TIAs but not outright strokes  His surgical history is only significant for the CABG  Socially he is a lifelong nonsmoker and has worked in the [employer]  He is unaware of any asbestos exposures in his past and he is not a  of any services  He does not have any concerning family history except for the coronary disease  Review of Systems   Respiratory: Positive for shortness of breath  Negative for cough, chest tightness and wheezing  Cardiovascular: Negative for chest pain and leg swelling     All other systems reviewed and are negative  Historical Information   Past Medical History:   Diagnosis Date    Elevated liver enzymes     Hypertension     TIA (transient ischemic attack)      Past Surgical History:   Procedure Laterality Date    CARDIAC SURGERY       Family History   Problem Relation Age of Onset    Heart failure Mother     Heart failure Father     Stroke Brother      Meds/Allergies     Current Outpatient Medications:     aspirin 81 MG tablet, Take 81 mg by mouth, Disp: , Rfl:     celecoxib (CeleBREX) 200 mg capsule, Take 200 mg by mouth daily With a meal, Disp: , Rfl: 3    diltiazem (DILTIAZEM CD) 120 mg 24 hr capsule, Take 120 mg by mouth, Disp: , Rfl:     escitalopram (LEXAPRO) 10 mg tablet, Take 1 tablet (10 mg total) by mouth daily, Disp: 90 tablet, Rfl: 2    lisinopril (ZESTRIL) 20 mg tablet, , Disp: , Rfl:     Multiple Vitamins-Minerals (CENTRUM SILVER PO), Take by mouth, Disp: , Rfl:     nitroglycerin (NITROSTAT) 0 4 mg SL tablet, Place 1 tablet under the tongue every 5 (five) minutes as needed, Disp: , Rfl:     nystatin (MYCOSTATIN) powder, Apply topically 2 (two) times a day, Disp: 30 g, Rfl: 3    Omega-3 Fatty Acids (FISH OIL) 1200 MG CAPS, Take by mouth, Disp: , Rfl:     rosuvastatin (CRESTOR) 20 MG tablet, Take 1 tablet (20 mg total) by mouth daily, Disp: 90 tablet, Rfl: 3    sildenafil (VIAGRA) 100 mg tablet, Take 100 mg by mouth, Disp: , Rfl:     warfarin (COUMADIN) 5 mg tablet, Take 5 mg by mouth, Disp: , Rfl:   No current facility-administered medications for this visit  Facility-Administered Medications Ordered in Other Visits:     albuterol inhalation solution 2 5 mg, 2 5 mg, Nebulization, Once, Hermelindo Dowell MD  No Known Allergies    Vitals: Blood pressure 160/80, pulse 70, temperature 98 1 °F (36 7 °C), temperature source Tympanic, height 5' 9" (1 753 m), weight 83 9 kg (185 lb), SpO2 97 %  Body mass index is 27 32 kg/m²   Oxygen Therapy  SpO2: 97 %  Oxygen Therapy: None (Room air)    Physical Exam  Physical Exam   Constitutional: He is oriented to person, place, and time  He appears well-developed and well-nourished  No distress  HENT:   Head: Normocephalic and atraumatic  Mouth/Throat: No oropharyngeal exudate  Eyes: Pupils are equal, round, and reactive to light  Conjunctivae and EOM are normal    Neck: Neck supple  No JVD present  No thyromegaly present  Cardiovascular: Normal rate, regular rhythm and normal heart sounds  Exam reveals no gallop and no friction rub  No murmur heard  Pulmonary/Chest: Effort normal and breath sounds normal  No respiratory distress  He has no wheezes  He has no rales  Musculoskeletal: He exhibits no edema or tenderness  Lymphadenopathy:     He has no cervical adenopathy  Neurological: He is alert and oriented to person, place, and time  Skin: Skin is warm and dry  No rash noted  Vitals reviewed  Labs: I have personally reviewed pertinent lab results  Lab Results   Component Value Date    WBC 5 89 12/01/2019    HGB 15 6 12/18/2019    HCT 46 12/18/2019    MCV 97 12/01/2019     12/01/2019     Lab Results   Component Value Date    GLUCOSE 99 12/18/2019    CALCIUM 9 1 12/01/2019     (H) 09/26/2015    K 4 1 12/01/2019    CO2 25 12/18/2019     (H) 12/01/2019    BUN 13 12/01/2019    CREATININE 1 05 12/01/2019     No results found for: IGE  Lab Results   Component Value Date    ALT 40 12/01/2019    AST 27 12/01/2019    ALKPHOS 81 12/01/2019    BILITOT 0 53 08/24/2014     Imaging and other studies: I have personally reviewed pertinent films in PACS  He has a chest CT with no clear evidence of interstitial disease; however, he has some bibasilar but L>R scarring that could represent early developing ILD  Formal read is pending      Pulmonary function testing:   FEV1/FVC ratio 73%    FEV1 72% predicted  FVC 73% predicted  No response to bronchodilators  TLC 83 % predicted  RV 92 % predicted  DLCO corrected for hemoglobin 44 % predicted  Overall, isolated DLCO decrease to the moderate impairment range  EKG, Pathology, and Other Studies: I have personally reviewed pertinent films in PACS    [facility]

## 2019-12-20 NOTE — ASSESSMENT & PLAN NOTE
He has snoring and his wife notes she is concerned about his stopping breathing at night  He thinks he has undergone a sleep study previously and he is on nocturnal oxygen  I sent him for an at-home sleep study  Would proceed to in-lab split night if positive

## 2019-12-20 NOTE — ASSESSMENT & PLAN NOTE
He describes largely unchanging shortness of breath over years  Testing has revealed a continuously decreasing diffusion capacity over years  Isolated decreases in diffusion capacity usually either lend toward pulmonary hypertension or developing interstitial lung disease, but they can happen for no apparent reason as well  His formal CT scan reading is pending, but he does appear to have some L>R basilar scarring developing with some subpleural reticulation that persists on prone imaging by my interpretation  He has had echocardiograms but they are not within our system  We will request them and wait on his formal interpretation before deciding whether to get aggressive into looking into pulmonary hypertension or interstitial lung diseases  I told him I did not think he was likely to benefit from a rescue inhaler based on his testing, and he agrees  He had no desaturations on 6 minute walk testing

## 2019-12-27 ENCOUNTER — TELEPHONE (OUTPATIENT)
Dept: PULMONOLOGY | Facility: CLINIC | Age: 74
End: 2019-12-27

## 2019-12-27 NOTE — TELEPHONE ENCOUNTER
I am sorry, for whatever reason it did not filter to my inbox when completed  There was no evidence of interstitial lung disease on the CT scan  Please call and let him know  We were hoping to have his most recent echocardiogram from [facility], but I do not see it yet  I wouldn't be able to make additional recommendations without it

## 2019-12-27 NOTE — TELEPHONE ENCOUNTER
Patient calling saying he was in last week to review his CT results, but the final reading was not back yet  He said he was supposed to get a call when it was back but he has not heard anything

## 2019-12-27 NOTE — TELEPHONE ENCOUNTER
This was only brought to my attention today  If the pt was not informed that in the meantime, he can get in touch with [facility] to get his records, I can get in contact with them next week to get the records in as soon as possible  Was the pt informed that he can try to get the records in the meantime so it does not have to wait until next week?

## 2019-12-27 NOTE — TELEPHONE ENCOUNTER
Spoke with patient  He said he did not request for his ECHO to be sent to us, that we were supposed to do that  Just checking to see if that was taken care of as Dr Vanessa Shannon is waiting on that

## 2020-01-06 ENCOUNTER — OFFICE VISIT (OUTPATIENT)
Dept: INTERNAL MEDICINE CLINIC | Facility: CLINIC | Age: 75
End: 2020-01-06
Payer: MEDICARE

## 2020-01-06 VITALS
OXYGEN SATURATION: 95 % | RESPIRATION RATE: 14 BRPM | WEIGHT: 187 LBS | TEMPERATURE: 97.5 F | SYSTOLIC BLOOD PRESSURE: 118 MMHG | BODY MASS INDEX: 27.7 KG/M2 | HEART RATE: 76 BPM | DIASTOLIC BLOOD PRESSURE: 70 MMHG | HEIGHT: 69 IN

## 2020-01-06 DIAGNOSIS — R06.02 SOB (SHORTNESS OF BREATH): Primary | ICD-10-CM

## 2020-01-06 DIAGNOSIS — I10 ESSENTIAL HYPERTENSION: ICD-10-CM

## 2020-01-06 DIAGNOSIS — F41.9 ANXIETY: ICD-10-CM

## 2020-01-06 DIAGNOSIS — I25.10 CAD IN NATIVE ARTERY: ICD-10-CM

## 2020-01-06 PROCEDURE — 99214 OFFICE O/P EST MOD 30 MIN: CPT | Performed by: INTERNAL MEDICINE

## 2020-01-06 NOTE — PROGRESS NOTES
Assessment/Plan:    Essential hypertension  The patient's hypertension was evaluated during today's visit it shows a blood pressure reading 118/70 and recommend that he continue his present hypertensive therapy of lisinopril 20 mg daily  He also is on diltiazem 120 mg daily  He has no apparent side effects of these drugs and is recommended that he continue therapy as directed  Follow-up assessment in 1-2 months is recommended  CAD in native artery  History of coronary artery disease without any active symptoms of angina at this time  He does have shortness of breath which is believed to be noncardiac in nature  SOB (shortness of breath)  Shortness of breath on exertion especially reviewed his visit with Pulmonary services at [facility]  Differential diagnosis includes interstitial lung disease and pulmonary hypertension  Awaiting the results of his sleep apnea studies as well as echocardiogram for further evaluation  I plan on seeing the patient back after his 2nd visit with his pulmonologist   Cardiac consult indicates that he does not believe that his shortness of breath his cardiac based  Anxiety  Anxiety issues continue be well controlled with Lexapro 10 mg daily no apparent side effects of this medication noted recommend continue at 10 mg daily reassessment in 2 months       Diagnoses and all orders for this visit:    Essential hypertension    CAD in native artery    SOB (shortness of breath)        Subjective:      Patient ID: Kang Graham  is a 66 y o  male  This 64-year-old gentleman returns today to follow up his studies ordered on last visit  He underwent a blood gas as well as CT scan of his chest high-resolution type study to evaluate for possible interstitial lung disease  He has also had his 1st visit with Dr Chaz Mueller of the Pulmonary Department at [facility]    I reviewed with the patient Dr Chaz Mueller review of his condition of diminishing diffusion capacity of his lungs  Differential diagnosis for his diminishing diffusion capacity of would be pulmonary hypertension as well as interstitial lung disease  A CT scan was reviewed today with the patient does not show any evidence to support interstitial lung disease  There has been some question whether not the patient has a history of sleep apnea  His cardiologist has been recommending oxygen supplementation for this gentleman for quite some time  I cannot locate any formal sleep studies done previously  His pulmonologist has requested home sleep study to evaluate for possible sleep apnea  Were also awaiting a copy of the patient's echocardiogram that was done by his cardiologist who is a employee of [employer] [id number]  We do not have access to that echocardiogram yet  The patient does have a history of coronary artery disease but denies any chest pains suggestive of unstable angina at this time  The following portions of the patient's history were reviewed and updated as appropriate:   He  has a past medical history of Elevated liver enzymes, Hypertension, and TIA (transient ischemic attack)  He   Patient Active Problem List    Diagnosis Date Noted    Snoring 12/20/2019    SOB (shortness of breath) 12/10/2019    Anxiety 12/10/2019    Supraventricular tachycardia ([facility] [address] ) 12/19/2018    Renal insufficiency 07/31/2018    Essential hypertension 04/11/2018    Abnormal glucose 04/11/2018    Over weight 04/11/2018    Anticoagulant long-term use 10/11/2017    ED (erectile dysfunction) 08/10/2017    Hypercholesterolemia 08/10/2017    Hypertriglyceridemia 09/12/2016    CAD in native artery 05/12/2016    TIA (transient ischemic attack) 02/24/2016    Lumbar canal stenosis 09/13/2013     He  has a past surgical history that includes Cardiac surgery  His family history includes Heart failure in his father and mother; Stroke in his brother  He  reports that he has never smoked   He has never used smokeless tobacco  He reports that he does not drink alcohol or use drugs  Current Outpatient Medications   Medication Sig Dispense Refill    aspirin 81 MG tablet Take 81 mg by mouth      celecoxib (CeleBREX) 200 mg capsule Take 200 mg by mouth daily With a meal  3    diltiazem (DILTIAZEM CD) 120 mg 24 hr capsule Take 120 mg by mouth      escitalopram (LEXAPRO) 10 mg tablet Take 1 tablet (10 mg total) by mouth daily 90 tablet 2    lisinopril (ZESTRIL) 20 mg tablet       Multiple Vitamins-Minerals (CENTRUM SILVER PO) Take by mouth      nitroglycerin (NITROSTAT) 0 4 mg SL tablet Place 1 tablet under the tongue every 5 (five) minutes as needed      nystatin (MYCOSTATIN) powder Apply topically 2 (two) times a day 30 g 3    Omega-3 Fatty Acids (FISH OIL) 1200 MG CAPS Take by mouth      rosuvastatin (CRESTOR) 20 MG tablet Take 1 tablet (20 mg total) by mouth daily 90 tablet 3    sildenafil (VIAGRA) 100 mg tablet Take 100 mg by mouth      warfarin (COUMADIN) 5 mg tablet Take 5 mg by mouth       No current facility-administered medications for this visit       Review of Systems   Constitutional: Positive for fatigue  Respiratory: Positive for chest tightness and shortness of breath  Shortness of breath is essentially on exertion at rest he does not experience any significant shortness of breath  Psychiatric/Behavioral: The patient is nervous/anxious  All other systems reviewed and are negative  Objective:      /70   Pulse 76   Temp 97 5 °F (36 4 °C)   Resp 14   Ht 5' 9" (1 753 m)   Wt 84 8 kg (187 lb)   SpO2 95%   BMI 27 62 kg/m²          Physical Exam   Constitutional: He is oriented to person, place, and time  He appears well-developed and well-nourished     HENT:   Right Ear: Hearing, tympanic membrane, external ear and ear canal normal    Left Ear: Hearing, tympanic membrane, external ear and ear canal normal    Nose: Nose normal    Mouth/Throat: Oropharynx is clear and moist and mucous membranes are normal    Eyes: Pupils are equal, round, and reactive to light  Conjunctivae are normal    Neck: No thyromegaly present  Cardiovascular: Normal rate, regular rhythm, S1 normal, S2 normal, normal heart sounds and intact distal pulses  No murmur heard  Pulmonary/Chest: Effort normal and breath sounds normal    Musculoskeletal: Normal range of motion  He exhibits no edema  Lymphadenopathy:     He has no cervical adenopathy  Neurological: He is alert and oriented to person, place, and time  He has normal reflexes  Skin: Skin is warm and dry  Psychiatric: He has a normal mood and affect   His behavior is normal  Judgment and thought content normal

## 2020-01-06 NOTE — ASSESSMENT & PLAN NOTE
The patient's hypertension was evaluated during today's visit it shows a blood pressure reading 118/70 and recommend that he continue his present hypertensive therapy of lisinopril 20 mg daily  He also is on diltiazem 120 mg daily  He has no apparent side effects of these drugs and is recommended that he continue therapy as directed  Follow-up assessment in 1-2 months is recommended

## 2020-01-06 NOTE — ASSESSMENT & PLAN NOTE
Anxiety issues continue be well controlled with Lexapro 10 mg daily no apparent side effects of this medication noted recommend continue at 10 mg daily reassessment in 2 months

## 2020-01-06 NOTE — ASSESSMENT & PLAN NOTE
Shortness of breath on exertion especially reviewed his visit with Pulmonary services at [facility]  Differential diagnosis includes interstitial lung disease and pulmonary hypertension  Awaiting the results of his sleep apnea studies as well as echocardiogram for further evaluation  I plan on seeing the patient back after his 2nd visit with his pulmonologist   Cardiac consult indicates that he does not believe that his shortness of breath his cardiac based

## 2020-01-06 NOTE — ASSESSMENT & PLAN NOTE
History of coronary artery disease without any active symptoms of angina at this time  He does have shortness of breath which is believed to be noncardiac in nature

## 2020-01-09 ENCOUNTER — TELEPHONE (OUTPATIENT)
Dept: SLEEP CENTER | Facility: CLINIC | Age: 75
End: 2020-01-09

## 2020-01-09 NOTE — TELEPHONE ENCOUNTER
----- Message from Camron Baugh MD sent at 1/8/2020  2:17 PM EST -----  Snoring and hypertension    Approved  ----- Message -----  From: Fidelia Saldana  Sent: 1/8/2020   2:12 PM EST  To: Sleep Medicine [facility], #    PLEASE REVIEW FOR APPROVAL OR DENIAL AND WHY

## 2020-02-02 ENCOUNTER — HOSPITAL ENCOUNTER (OUTPATIENT)
Dept: SLEEP CENTER | Facility: CLINIC | Age: 75
Discharge: HOME | End: 2020-02-02
Payer: MEDICARE

## 2020-02-02 DIAGNOSIS — R06.83 SNORING: ICD-10-CM

## 2020-02-02 PROCEDURE — G0399 HOME SLEEP TEST/TYPE 3 PORTA: HCPCS | Performed by: INTERNAL MEDICINE

## 2020-02-02 PROCEDURE — G0399 HOME SLEEP TEST/TYPE 3 PORTA: HCPCS

## 2020-02-03 NOTE — PROGRESS NOTES
Home Sleep Study Documentation    Pre-Sleep Home Study:    Set-up and instructions performed by: Walt KAY    Technician performed demonstration for Patient: yes    Return demonstration performed by Patient: yes    Written instructions provided to Patient: yes    Patient signed consent form: yes        Post-Sleep Home Study:    Additional comments by Patient: none    Home Sleep Study Failed:no:    Failure reason: N/A    Reported or Detected: N/A    Scored by: Krista Madrigal CRT, RPSGT

## 2020-02-04 ENCOUNTER — TELEPHONE (OUTPATIENT)
Dept: PULMONOLOGY | Facility: CLINIC | Age: 75
End: 2020-02-04

## 2020-02-04 DIAGNOSIS — R06.81 CENTRAL APNEA: ICD-10-CM

## 2020-02-04 DIAGNOSIS — G47.33 OSA (OBSTRUCTIVE SLEEP APNEA): Primary | ICD-10-CM

## 2020-02-04 NOTE — TELEPHONE ENCOUNTER
Patient called regarding sleep results  He stated that someone called him but he didn't know who  He would like he titration process explained to him  I did tell him about the test but he wanted to speak with you

## 2020-02-04 NOTE — TELEPHONE ENCOUNTER
I called patient and went over the results of his sleep study with him in great detail  I informed him he has severe SKYLER and that the recommendation is to move forward with a CPAP titration study  Patient is agreeable to move forward with treatment and he has been scheduled for the first weekend appointment in [place] (per his request) Script with time, date and location has been mailed out to patient

## 2020-03-17 ENCOUNTER — TELEMEDICINE (OUTPATIENT)
Dept: PULMONOLOGY | Facility: CLINIC | Age: 75
End: 2020-03-17

## 2020-03-17 DIAGNOSIS — R06.02 SOB (SHORTNESS OF BREATH): ICD-10-CM

## 2020-03-17 DIAGNOSIS — G47.33 OSA (OBSTRUCTIVE SLEEP APNEA): Primary | ICD-10-CM

## 2020-03-17 PROCEDURE — G2012 BRIEF CHECK IN BY MD/QHP: HCPCS | Performed by: INTERNAL MEDICINE

## 2020-03-18 NOTE — PROGRESS NOTES
Virtual Check-in Visit    Reason for visit is a follow-up for obstructive sleep apnea, shortness of breath, and possible pulmonary hypertension  This virtual check-in was done via telephone  Encounter provider Seth Covarrubias MD    Provider located at [address] [facility]      Recent Visits  No visits were found meeting these conditions  Showing recent visits within past 7 days and meeting all other requirements     Future Appointments  No visits were found meeting these conditions  Showing future appointments within next 150 days and meeting all other requirements        Patient agrees to participate in a virtual check in via telephone or video visit instead of presenting to the office to address urgent/immediate medical needs  Patient is aware this is a billable service  After connecting through telephone, the patient was identified by name and date of birth  Ted Aver  was informed that this was a telemedicine visit and that the exam was being conducted confidentially over secure lines  My office door was closed  No one else was in the room  He acknowledged consent and understanding of privacy and security of the virtual check-in visit  I informed the patient that I have reviewed his record in Epic and presented the opportunity for him to ask any questions regarding the visit today  The patient initiated communication and agreed to participate  Cathy Palma  is a 66 y o  male who I am following up for shortness of breath  He has a history of abnormal pulmonary function testing with isolated decrease in his DLCO  He has stable dyspnea on mild exertion, unchanged since his last visit  CT scan performed in the interim showed no evidence of developing interstitial disease    Echocardiograms were obtained from his primary cardiologist which showed estimated pulmonary pressure 26, but he did have a dilated right ventricle with reduced systolic function, suspicious for potentially worse pulmonary hypertension  He has developed no new symptoms since his last appointment  He had no other acute complaints to address today  He does note that he had severe obstructive sleep apnea and is awaiting his follow-up in-lab CPAP titration study  Past Medical History:   Diagnosis Date    Elevated liver enzymes     Hypertension     TIA (transient ischemic attack)        Past Surgical History:   Procedure Laterality Date    CARDIAC SURGERY         Current Outpatient Medications   Medication Sig Dispense Refill    aspirin 81 MG tablet Take 81 mg by mouth      celecoxib (CeleBREX) 200 mg capsule Take 200 mg by mouth daily With a meal  3    diltiazem (DILTIAZEM CD) 120 mg 24 hr capsule Take 120 mg by mouth      escitalopram (LEXAPRO) 10 mg tablet Take 1 tablet (10 mg total) by mouth daily 90 tablet 2    lisinopril (ZESTRIL) 20 mg tablet       Multiple Vitamins-Minerals (CENTRUM SILVER PO) Take by mouth      nitroglycerin (NITROSTAT) 0 4 mg SL tablet Place 1 tablet under the tongue every 5 (five) minutes as needed      nystatin (MYCOSTATIN) powder Apply topically 2 (two) times a day 30 g 3    Omega-3 Fatty Acids (FISH OIL) 1200 MG CAPS Take by mouth      rosuvastatin (CRESTOR) 20 MG tablet Take 1 tablet (20 mg total) by mouth daily 90 tablet 3    sildenafil (VIAGRA) 100 mg tablet Take 100 mg by mouth      warfarin (COUMADIN) 5 mg tablet Take 5 mg by mouth       No current facility-administered medications for this visit  No Known Allergies    Assessment    Persistent dyspnea on mild exertion, 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 hypertension, 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, rule out group 1  Disposition:    His condition is stable  I will be reaching out to his cardiologist to assess whether he is a candidate for a right heart catheterization  I am in favor of this    Although he has uncontrolled sleep apnea and some known left-sided heart disease, the severity of abnormality both on his echocardiogram on os pulmonary function testing would make me want to rule out a group 1 cause  I have personally spent 5 minutes reviewing the chart and imaging prior to the visit  I have personally spent 20 minutes on the phone with the patient  I have personally spent 5 minutes reviewing imaging, laboratory results and other testing results with the patient

## 2020-03-23 ENCOUNTER — TELEMEDICINE (OUTPATIENT)
Dept: INTERNAL MEDICINE CLINIC | Facility: CLINIC | Age: 75
End: 2020-03-23
Payer: MEDICARE

## 2020-03-23 DIAGNOSIS — I47.1 SUPRAVENTRICULAR TACHYCARDIA (HCC): ICD-10-CM

## 2020-03-23 DIAGNOSIS — I25.10 CAD IN NATIVE ARTERY: ICD-10-CM

## 2020-03-23 DIAGNOSIS — I10 ESSENTIAL HYPERTENSION: ICD-10-CM

## 2020-03-23 DIAGNOSIS — R06.02 SOB (SHORTNESS OF BREATH): ICD-10-CM

## 2020-03-23 DIAGNOSIS — G47.33 OSA (OBSTRUCTIVE SLEEP APNEA): Primary | ICD-10-CM

## 2020-03-23 PROCEDURE — G2012 BRIEF CHECK IN BY MD/QHP: HCPCS | Performed by: INTERNAL MEDICINE

## 2020-03-23 NOTE — PROGRESS NOTES
Virtual Brief Visit    Reason for visit is this is a follow-up visit for this gentleman with a history of coronary artery disease hypertension and ongoing shortness of breath  Encounter provider Beck Oliver MD    Provider located at [place] [id number]      Recent Visits  No visits were found meeting these conditions  Showing recent visits within past 7 days and meeting all other requirements     Future Appointments  No visits were found meeting these conditions  Showing future appointments within next 150 days and meeting all other requirements        Patient agrees to participate in a virtual check in via telephone or video visit instead of presenting to the office to address urgent/immediate medical needs  Patient is aware this is a billable service  After connecting through telephone, the patient was identified by name and date of birth  Garrett Gonzalez  was informed that this was a telemedicine visit and that the visit is being conducted through telephone which may not be secure and therefore might not be HIPAA-compliant  My office door was closed  No one else was in the room  He acknowledged consent and understanding of privacy and security of the virtual check-in visit  I informed the patient that I have reviewed his record in Epic and presented the opportunity for him to ask any questions regarding the visit today  The patient initiated communication and agreed to participate  Kelin Crowley  is a 66 y o  male with a history of coronary disease hypertension and ongoing shortness of breath issues  He has been seen by both Cardiology and Pulmonary services for evaluation of this shortness of breath symptoms  Of most recently he was contacted by his pulmonologist for follow-up visit  Review of that note indicates ongoing concerns about possible pulmonary hypertension    The patient's pulmonologist indicated that he would discussed the case with the patient's cardiologist for consideration of right-sided cardiac catheterization  The patient also has an upcoming sleep apnea study scheduled for early May  His shortness of breath continues to be present  Past Medical History:   Diagnosis Date    Elevated liver enzymes     Hypertension     TIA (transient ischemic attack)        Past Surgical History:   Procedure Laterality Date    CARDIAC SURGERY         Current Outpatient Medications   Medication Sig Dispense Refill    aspirin 81 MG tablet Take 81 mg by mouth      celecoxib (CeleBREX) 200 mg capsule Take 200 mg by mouth daily With a meal  3    diltiazem (DILTIAZEM CD) 120 mg 24 hr capsule Take 120 mg by mouth      escitalopram (LEXAPRO) 10 mg tablet Take 1 tablet (10 mg total) by mouth daily 90 tablet 2    lisinopril (ZESTRIL) 20 mg tablet       Multiple Vitamins-Minerals (CENTRUM SILVER PO) Take by mouth      nitroglycerin (NITROSTAT) 0 4 mg SL tablet Place 1 tablet under the tongue every 5 (five) minutes as needed      nystatin (MYCOSTATIN) powder Apply topically 2 (two) times a day 30 g 3    Omega-3 Fatty Acids (FISH OIL) 1200 MG CAPS Take by mouth      rosuvastatin (CRESTOR) 20 MG tablet Take 1 tablet (20 mg total) by mouth daily 90 tablet 3    sildenafil (VIAGRA) 100 mg tablet Take 100 mg by mouth      warfarin (COUMADIN) 5 mg tablet Take 5 mg by mouth       No current facility-administered medications for this visit  No Known Allergies    Assessment    Dm telephone assessment is That of ongoing shortness of breath specially on exertion  Workup has included CT scanning of the chest which she did not show any evidence of progressive interstitial lung disease  He does have a history of hypertension which has been controlled on his last visit  He denies any chest pains or palpitations    Of there is a sleep apnea study scheduled for early May to evaluate for possible sleep apnea  Treatment will be entertained if study is positive  In addition the patient's pulmonologist will be contacting the patient's cardiologist discuss the possibility of a right-sided cardiac catheterization due to elevated pulmonary artery pressure readings on his last echocardiogram   He also has a history of supraventricular tachycardia and denies any recent palpitations symptoms          Disposition:    I have recommended that the patient continue on his present therapy of medications  He does not need any refills on his current medications  I would like to see him in mid June to review his sleep study  Will await the interaction between his pulmonologist and cardiologist regarding a possible right side cardiac catheterization to evaluate for pulmonary hypertension  He indicates that he has had no symptoms of chest pain or palpitations recommend continuation of all medications as presently prescribed  I spent 15 minutes with the patient during this virtual check-in visit

## 2020-04-22 DIAGNOSIS — G47.33 OSA (OBSTRUCTIVE SLEEP APNEA): Primary | ICD-10-CM

## 2020-04-23 ENCOUNTER — TELEPHONE (OUTPATIENT)
Dept: SLEEP CENTER | Facility: CLINIC | Age: 75
End: 2020-04-23

## 2020-04-23 ENCOUNTER — TELEPHONE (OUTPATIENT)
Dept: PULMONOLOGY | Facility: CLINIC | Age: 75
End: 2020-04-23

## 2020-04-28 ENCOUNTER — TELEPHONE (OUTPATIENT)
Dept: PULMONOLOGY | Facility: CLINIC | Age: 75
End: 2020-04-28

## 2020-06-25 ENCOUNTER — TELEPHONE (OUTPATIENT)
Dept: SLEEP CENTER | Facility: CLINIC | Age: 75
End: 2020-06-25

## 2020-06-25 DIAGNOSIS — Z20.822 ENCOUNTER FOR LABORATORY TESTING FOR COVID-19 VIRUS: Primary | ICD-10-CM

## 2020-07-03 DIAGNOSIS — Z20.822 ENCOUNTER FOR LABORATORY TESTING FOR COVID-19 VIRUS: ICD-10-CM

## 2020-07-03 PROCEDURE — U0003 INFECTIOUS AGENT DETECTION BY NUCLEIC ACID (DNA OR RNA); SEVERE ACUTE RESPIRATORY SYNDROME CORONAVIRUS 2 (SARS-COV-2) (CORONAVIRUS DISEASE [COVID-19]), AMPLIFIED PROBE TECHNIQUE, MAKING USE OF HIGH THROUGHPUT TECHNOLOGIES AS DESCRIBED BY CMS-2020-01-R: HCPCS | Performed by: INTERNAL MEDICINE

## 2020-07-08 LAB — SARS-COV-2 RNA SPEC QL NAA+PROBE: NOT DETECTED

## 2020-07-09 ENCOUNTER — HOSPITAL ENCOUNTER (OUTPATIENT)
Dept: SLEEP CENTER | Facility: CLINIC | Age: 75
Discharge: HOME | End: 2020-07-09
Payer: MEDICARE

## 2020-07-09 DIAGNOSIS — R06.81 CENTRAL APNEA: ICD-10-CM

## 2020-07-09 DIAGNOSIS — G47.33 OSA (OBSTRUCTIVE SLEEP APNEA): ICD-10-CM

## 2020-07-09 PROCEDURE — 95811 POLYSOM 6/>YRS CPAP 4/> PARM: CPT

## 2020-07-09 PROCEDURE — 95811 POLYSOM 6/>YRS CPAP 4/> PARM: CPT | Performed by: INTERNAL MEDICINE

## 2020-07-10 ENCOUNTER — TRANSCRIBE ORDERS (OUTPATIENT)
Dept: ADMINISTRATIVE | Age: 75
End: 2020-07-10

## 2020-07-10 ENCOUNTER — APPOINTMENT (OUTPATIENT)
Dept: LAB | Age: 75
End: 2020-07-10
Payer: MEDICARE

## 2020-07-10 DIAGNOSIS — Z79.899 ENCOUNTER FOR LONG-TERM (CURRENT) USE OF OTHER MEDICATIONS: ICD-10-CM

## 2020-07-10 DIAGNOSIS — I47.1 PAROXYSMAL SUPRAVENTRICULAR TACHYCARDIA (HCC): ICD-10-CM

## 2020-07-10 DIAGNOSIS — E78.00 PURE HYPERCHOLESTEROLEMIA: ICD-10-CM

## 2020-07-10 DIAGNOSIS — I47.1 PAROXYSMAL SUPRAVENTRICULAR TACHYCARDIA (HCC): Primary | ICD-10-CM

## 2020-07-10 LAB
ALBUMIN SERPL BCP-MCNC: 3.8 G/DL (ref 3.5–5)
ALP SERPL-CCNC: 78 U/L (ref 46–116)
ALT SERPL W P-5'-P-CCNC: 39 U/L (ref 12–78)
ANION GAP SERPL CALCULATED.3IONS-SCNC: 3 MMOL/L (ref 4–13)
AST SERPL W P-5'-P-CCNC: 30 U/L (ref 5–45)
BACTERIA UR QL AUTO: NORMAL /HPF
BASOPHILS # BLD AUTO: 0.03 THOUSANDS/ΜL (ref 0–0.1)
BASOPHILS NFR BLD AUTO: 1 % (ref 0–1)
BILIRUB SERPL-MCNC: 0.71 MG/DL (ref 0.2–1)
BILIRUB UR QL STRIP: NEGATIVE
BUN SERPL-MCNC: 16 MG/DL (ref 5–25)
CALCIUM SERPL-MCNC: 9.9 MG/DL (ref 8.3–10.1)
CHLORIDE SERPL-SCNC: 110 MMOL/L (ref 100–108)
CHOLEST SERPL-MCNC: 153 MG/DL (ref 50–200)
CLARITY UR: CLEAR
CO2 SERPL-SCNC: 29 MMOL/L (ref 21–32)
COLOR UR: YELLOW
CREAT SERPL-MCNC: 1.01 MG/DL (ref 0.6–1.3)
CREAT UR-MCNC: 120 MG/DL
EOSINOPHIL # BLD AUTO: 0.17 THOUSAND/ΜL (ref 0–0.61)
EOSINOPHIL NFR BLD AUTO: 3 % (ref 0–6)
ERYTHROCYTE [DISTWIDTH] IN BLOOD BY AUTOMATED COUNT: 12.3 % (ref 11.6–15.1)
GFR SERPL CREATININE-BSD FRML MDRD: 72 ML/MIN/1.73SQ M
GLUCOSE P FAST SERPL-MCNC: 94 MG/DL (ref 65–99)
GLUCOSE UR STRIP-MCNC: NEGATIVE MG/DL
HCT VFR BLD AUTO: 47.6 % (ref 36.5–49.3)
HDLC SERPL-MCNC: 40 MG/DL
HGB BLD-MCNC: 16 G/DL (ref 12–17)
HGB UR QL STRIP.AUTO: NEGATIVE
HYALINE CASTS #/AREA URNS LPF: NORMAL /LPF
IMM GRANULOCYTES # BLD AUTO: 0.02 THOUSAND/UL (ref 0–0.2)
IMM GRANULOCYTES NFR BLD AUTO: 0 % (ref 0–2)
KETONES UR STRIP-MCNC: NEGATIVE MG/DL
LDLC SERPL CALC-MCNC: 91 MG/DL (ref 0–100)
LDLC SERPL DIRECT ASSAY-MCNC: 94 MG/DL (ref 0–100)
LEUKOCYTE ESTERASE UR QL STRIP: NEGATIVE
LYMPHOCYTES # BLD AUTO: 1.87 THOUSANDS/ΜL (ref 0.6–4.47)
LYMPHOCYTES NFR BLD AUTO: 29 % (ref 14–44)
MCH RBC QN AUTO: 32.7 PG (ref 26.8–34.3)
MCHC RBC AUTO-ENTMCNC: 33.6 G/DL (ref 31.4–37.4)
MCV RBC AUTO: 97 FL (ref 82–98)
MICROALBUMIN UR-MCNC: 7.4 MG/L (ref 0–20)
MICROALBUMIN/CREAT 24H UR: 6 MG/G CREATININE (ref 0–30)
MONOCYTES # BLD AUTO: 0.51 THOUSAND/ΜL (ref 0.17–1.22)
MONOCYTES NFR BLD AUTO: 8 % (ref 4–12)
NEUTROPHILS # BLD AUTO: 3.89 THOUSANDS/ΜL (ref 1.85–7.62)
NEUTS SEG NFR BLD AUTO: 59 % (ref 43–75)
NITRITE UR QL STRIP: NEGATIVE
NON-SQ EPI CELLS URNS QL MICRO: NORMAL /HPF
NONHDLC SERPL-MCNC: 113 MG/DL
NRBC BLD AUTO-RTO: 0 /100 WBCS
PH UR STRIP.AUTO: 7 [PH]
PLATELET # BLD AUTO: 201 THOUSANDS/UL (ref 149–390)
PMV BLD AUTO: 10.2 FL (ref 8.9–12.7)
POTASSIUM SERPL-SCNC: 4.2 MMOL/L (ref 3.5–5.3)
PROT SERPL-MCNC: 7.5 G/DL (ref 6.4–8.2)
PROT UR STRIP-MCNC: NEGATIVE MG/DL
RBC # BLD AUTO: 4.89 MILLION/UL (ref 3.88–5.62)
RBC #/AREA URNS AUTO: NORMAL /HPF
SODIUM SERPL-SCNC: 142 MMOL/L (ref 136–145)
SP GR UR STRIP.AUTO: 1.02 (ref 1–1.03)
TRIGL SERPL-MCNC: 112 MG/DL
TSH SERPL DL<=0.05 MIU/L-ACNC: 1.56 UIU/ML (ref 0.36–3.74)
UROBILINOGEN UR QL STRIP.AUTO: 0.2 E.U./DL
WBC # BLD AUTO: 6.49 THOUSAND/UL (ref 4.31–10.16)
WBC #/AREA URNS AUTO: NORMAL /HPF

## 2020-07-10 PROCEDURE — 82043 UR ALBUMIN QUANTITATIVE: CPT | Performed by: INTERNAL MEDICINE

## 2020-07-10 PROCEDURE — 83721 ASSAY OF BLOOD LIPOPROTEIN: CPT

## 2020-07-10 PROCEDURE — 80053 COMPREHEN METABOLIC PANEL: CPT

## 2020-07-10 PROCEDURE — 84443 ASSAY THYROID STIM HORMONE: CPT

## 2020-07-10 PROCEDURE — 36415 COLL VENOUS BLD VENIPUNCTURE: CPT

## 2020-07-10 PROCEDURE — 82570 ASSAY OF URINE CREATININE: CPT | Performed by: INTERNAL MEDICINE

## 2020-07-10 PROCEDURE — 81001 URINALYSIS AUTO W/SCOPE: CPT | Performed by: INTERNAL MEDICINE

## 2020-07-10 PROCEDURE — 80061 LIPID PANEL: CPT

## 2020-07-10 PROCEDURE — 85025 COMPLETE CBC W/AUTO DIFF WBC: CPT

## 2020-07-10 NOTE — PROGRESS NOTES
Sleep Study Documentation    Pre-Sleep Study       Sleep testing procedure explained to patient:YES    Patient napped prior to study:YES- less than 30 minutes  Napped after 2PM: yes    Caffeine:Dayshift worker after 12PM   Caffeine use:YES- ice tea  12 to 26 ounces    Alcohol:Dayshift workers after 5PM: Alcohol use:NO    Typical day for patient:YES       Study Documentation    Sleep Study Indications: SKYLER, EDS, Hypertension, Snoring, CAD    Sleep Study: Treatment   Optimal PAP pressure:  7 cm   Leak:Small  Snore:Eliminated  REM Obtained:yes  Supplemental O2: no    Minimum SaO2 86%  Baseline SaO2  93%  PAP mask tried (list all) Size medium ResMed AirFit N20 nasal mask, ResMed AirFit F20 FFM   PAP mask choice (final)  Size medium ResMed AirFit F20 Full face mask   PAP mask type:full face  PAP pressure at which snoring was eliminated  7 cm   Minimum SaO2 at final PAP pressure 86%  Mode of Therapy:CPAP  ETCO2:No  CPAP changed to BiPAP:No    Mode of Therapy:CPAP    EKG abnormalities: yes:  EPOCH example and comments:  PVC's - ex epoch 454    EEG abnormalities: no    Sleep Study Recorded < 2 hours: N/A    Sleep Study Recorded > 2 hours but incomplete study: N/A    Sleep Study Recorded 6 hours but no sleep obtained: NO    Patient classification: employed       Post-Sleep Study    Medication used at bedtime or during sleep study:NO    Patient reports time it took to fall asleep:less than 20 minutes    Patient reports waking up during study:1 to 2 times  Patient reports returning to sleep in 10 to 30 minutes  Patient reports sleeping 6 to 8 hours without dreaming  Patient reports sleep during study:typical    Patient rated sleepiness: Not sleepy or tired    PAP treatment:yes: Post PAP treatment patient reports feeling unchanged and would wear PAP mask at home

## 2020-07-17 DIAGNOSIS — G47.33 OSA (OBSTRUCTIVE SLEEP APNEA): Primary | ICD-10-CM

## 2020-07-21 ENCOUNTER — TELEPHONE (OUTPATIENT)
Dept: PULMONOLOGY | Facility: HOSPITAL | Age: 75
End: 2020-07-21

## 2020-07-21 NOTE — TELEPHONE ENCOUNTER
I spoke with patient in depth about the results of his CPAP study  Patient is agreeable to move forward with CPAP therapy and would like to use [facility] since he already has his oxygen with them  Patient also questioned if he should be using his oxygen as well as the CPAP  I informed patient that per the sleep study he did not need added oxygen with the machine but to hold on it until his follow up appointment with Dr Debbie Wood in Aug and he can determine if pt should continue or d/c oxygen  Patient will continue to use his oxygen at night until he actually gets the machine  All of his questions were answered and he will call back in 2 weeks if he does not hear from [address]  Patient is also aware that he will need to come back towards the end of September/October for a CPAP compliance appointment

## 2020-07-21 NOTE — TELEPHONE ENCOUNTER
I called patient's home and his wife answered and said pt was not home  I have left a message with pt's wife asking that she please have pt call me back once he gets home so we can go over the results of his sleep study

## 2020-08-05 ENCOUNTER — OFFICE VISIT (OUTPATIENT)
Dept: INTERNAL MEDICINE CLINIC | Facility: CLINIC | Age: 75
End: 2020-08-05
Payer: MEDICARE

## 2020-08-05 VITALS
WEIGHT: 192.6 LBS | DIASTOLIC BLOOD PRESSURE: 68 MMHG | HEIGHT: 69 IN | BODY MASS INDEX: 28.53 KG/M2 | TEMPERATURE: 97.6 F | OXYGEN SATURATION: 96 % | SYSTOLIC BLOOD PRESSURE: 120 MMHG | HEART RATE: 78 BPM

## 2020-08-05 DIAGNOSIS — G47.33 OSA (OBSTRUCTIVE SLEEP APNEA): ICD-10-CM

## 2020-08-05 DIAGNOSIS — R73.09 ABNORMAL GLUCOSE: ICD-10-CM

## 2020-08-05 DIAGNOSIS — E78.00 HYPERCHOLESTEROLEMIA: Primary | ICD-10-CM

## 2020-08-05 DIAGNOSIS — I10 ESSENTIAL HYPERTENSION: ICD-10-CM

## 2020-08-05 DIAGNOSIS — E78.1 HYPERTRIGLYCERIDEMIA: ICD-10-CM

## 2020-08-05 DIAGNOSIS — I25.10 CAD IN NATIVE ARTERY: ICD-10-CM

## 2020-08-05 PROCEDURE — 4040F PNEUMOC VAC/ADMIN/RCVD: CPT | Performed by: INTERNAL MEDICINE

## 2020-08-05 PROCEDURE — 99215 OFFICE O/P EST HI 40 MIN: CPT | Performed by: INTERNAL MEDICINE

## 2020-08-05 PROCEDURE — 1160F RVW MEDS BY RX/DR IN RCRD: CPT | Performed by: INTERNAL MEDICINE

## 2020-08-05 PROCEDURE — 3078F DIAST BP <80 MM HG: CPT | Performed by: INTERNAL MEDICINE

## 2020-08-05 PROCEDURE — 1036F TOBACCO NON-USER: CPT | Performed by: INTERNAL MEDICINE

## 2020-08-05 PROCEDURE — 3008F BODY MASS INDEX DOCD: CPT | Performed by: INTERNAL MEDICINE

## 2020-08-05 PROCEDURE — 3074F SYST BP LT 130 MM HG: CPT | Performed by: INTERNAL MEDICINE

## 2020-08-05 RX ORDER — EZETIMIBE 10 MG/1
10 TABLET ORAL DAILY
Qty: 30 TABLET | Refills: 5
Start: 2020-08-05

## 2020-08-05 NOTE — ASSESSMENT & PLAN NOTE
Patient has started to use his CPAP machine for treatment of obstructive sleep apnea  He does not seem to be experiencing any issues other than some bilateral lower eyelid edema in the morning after uses the CPAP  I suggested that he discuss this with his sleep medicine physician

## 2020-08-05 NOTE — ASSESSMENT & PLAN NOTE
Previous elevation in fasting blood sugar was followed up on today's visit we reviewed the patient's most recent fasting blood sugar reading  This shows a reading of 94  I have encouraged him to continue be careful with carbohydrate and concentrated sugar consumption follow-up testing has been requested in a period of 4 months

## 2020-08-05 NOTE — ASSESSMENT & PLAN NOTE
Lipid profile shows upward trends this is been an ongoing issue with the patient he is currently on rosuvastatin 20 mg daily  His cardiologist has recommended and I endorse of his Zetia 10 mg daily    Follow-up blood testing in 4 months requested of review of healthy diet was performed today

## 2020-08-05 NOTE — PROGRESS NOTES
Assessment/Plan:    SKYLER (obstructive sleep apnea)  Patient has started to use his CPAP machine for treatment of obstructive sleep apnea  He does not seem to be experiencing any issues other than some bilateral lower eyelid edema in the morning after uses the CPAP  I suggested that he discuss this with his sleep medicine physician  Essential hypertension  Hypertension was assessed during today's visit we find blood pressure to be under excellent control reading of 120/68  Recommend that he continue his current dose of lisinopril which is 20 mg daily  CAD in native artery  History of coronary artery disease with suspicious symptoms of left arm discomfort recently  He discuss this with his cardiologist recently who has ordered follow-up studies  Will review the results of the studies when they are completed  He has not had any further episodes of this left arm discomfort    Hypertriglyceridemia  Reviewed with the patient his triglyceride reading I recommend continuation of care with concentrated sugar consumption as well as carbohydrate consumption  Follow-up testing in 4 months is recommended he should continue his Omega 3 fatty acid consumption  Hypercholesterolemia  Lipid profile shows upward trends this is been an ongoing issue with the patient he is currently on rosuvastatin 20 mg daily  His cardiologist has recommended and I endorse of his Zetia 10 mg daily  Follow-up blood testing in 4 months requested of review of healthy diet was performed today    Abnormal glucose  Previous elevation in fasting blood sugar was followed up on today's visit we reviewed the patient's most recent fasting blood sugar reading  This shows a reading of 94  I have encouraged him to continue be careful with carbohydrate and concentrated sugar consumption follow-up testing has been requested in a period of 4 months         Diagnoses and all orders for this visit:    Hypercholesterolemia  -     ezetimibe (ZETIA) 10 mg tablet; Take 1 tablet (10 mg total) by mouth daily  -     Lipid panel; Future  -     Comprehensive metabolic panel; Future    SKYLER (obstructive sleep apnea)    Essential hypertension    CAD in native artery    Hypertriglyceridemia    Abnormal glucose        Subjective:      Patient ID: Kanwal Arce  is a 66 y o  male  This 75-year-old gentleman returns today for a routine assessment of his hypertension and recent blood work  He indicates that he did have an episode of discomfort in his left arm which was not associated with exertion  He has not had a subsequent episode  He did see his cardiologist after this episode and he has ordered follow-up of testing including an echocardiogram and cardiogram       The following portions of the patient's history were reviewed and updated as appropriate:   He  has a past medical history of Elevated liver enzymes, Hypertension, and TIA (transient ischemic attack)  He   Patient Active Problem List    Diagnosis Date Noted    Central apnea     SKYLER (obstructive sleep apnea)     Snoring 12/20/2019    SOB (shortness of breath) 12/10/2019    Anxiety 12/10/2019    Supraventricular tachycardia ([address] ) 12/19/2018    Renal insufficiency 07/31/2018    Essential hypertension 04/11/2018    Abnormal glucose 04/11/2018    Over weight 04/11/2018    Anticoagulant long-term use 10/11/2017    ED (erectile dysfunction) 08/10/2017    Hypercholesterolemia 08/10/2017    Hypertriglyceridemia 09/12/2016    CAD in native artery 05/12/2016    TIA (transient ischemic attack) 02/24/2016    Lumbar canal stenosis 09/13/2013     He  has a past surgical history that includes Cardiac surgery  His family history includes Heart failure in his father and mother; Stroke in his brother  He  reports that he has never smoked  He has never used smokeless tobacco  He reports that he does not drink alcohol or use drugs    Current Outpatient Medications   Medication Sig Dispense Refill    aspirin 81 MG tablet Take 81 mg by mouth      celecoxib (CeleBREX) 200 mg capsule Take 200 mg by mouth daily With a meal  3    diltiazem (DILTIAZEM CD) 120 mg 24 hr capsule Take 120 mg by mouth      escitalopram (LEXAPRO) 10 mg tablet Take 1 tablet (10 mg total) by mouth daily 90 tablet 2    lisinopril (ZESTRIL) 20 mg tablet       Multiple Vitamins-Minerals (CENTRUM SILVER PO) Take by mouth      nitroglycerin (NITROSTAT) 0 4 mg SL tablet Place 1 tablet under the tongue every 5 (five) minutes as needed      nystatin (MYCOSTATIN) powder Apply topically 2 (two) times a day 30 g 3    Omega-3 Fatty Acids (FISH OIL) 1200 MG CAPS Take by mouth      rosuvastatin (CRESTOR) 20 MG tablet Take 1 tablet (20 mg total) by mouth daily 90 tablet 3    sildenafil (VIAGRA) 100 mg tablet Take 100 mg by mouth      warfarin (COUMADIN) 5 mg tablet Take 5 mg by mouth      ezetimibe (ZETIA) 10 mg tablet Take 1 tablet (10 mg total) by mouth daily 30 tablet 5     No current facility-administered medications for this visit       Review of Systems   Cardiovascular:        Left arm discomfort nonexertional was not associated with jaw discomfort shortness of breath or palpitations   All other systems reviewed and are negative  Objective:      /68   Pulse 78   Temp 97 6 °F (36 4 °C)   Ht 5' 9" (1 753 m)   Wt 87 4 kg (192 lb 9 6 oz)   SpO2 96%   BMI 28 44 kg/m²          Physical Exam   Constitutional: He is oriented to person, place, and time  He appears well-developed  Non-toxic appearance  He does not appear ill  No distress  HENT:   Right Ear: Hearing, tympanic membrane, external ear and ear canal normal    Left Ear: Hearing, tympanic membrane, external ear and ear canal normal    Nose: Nose normal    Eyes: Pupils are equal, round, and reactive to light  Conjunctivae are normal    Neck: No thyromegaly present     Cardiovascular: Normal rate, regular rhythm, S1 normal and S2 normal    Pulmonary/Chest: Effort normal and breath sounds normal    Abdominal: Soft  Bowel sounds are normal  There is no abdominal tenderness  Musculoskeletal: Normal range of motion  Lymphadenopathy:     He has no cervical adenopathy  Neurological: He is alert and oriented to person, place, and time  He has normal reflexes  Skin: Skin is warm and dry  He is not diaphoretic  Psychiatric: His behavior is normal  Judgment and thought content normal      BMI Counseling: Body mass index is 28 44 kg/m²  The BMI is above normal  Nutrition recommendations include reducing portion sizes, consuming healthier snacks, moderation in carbohydrate intake and reducing intake of saturated fat and trans fat

## 2020-08-05 NOTE — ASSESSMENT & PLAN NOTE
Hypertension was assessed during today's visit we find blood pressure to be under excellent control reading of 120/68  Recommend that he continue his current dose of lisinopril which is 20 mg daily

## 2020-08-05 NOTE — ASSESSMENT & PLAN NOTE
History of coronary artery disease with suspicious symptoms of left arm discomfort recently  He discuss this with his cardiologist recently who has ordered follow-up studies  Will review the results of the studies when they are completed    He has not had any further episodes of this left arm discomfort

## 2020-08-05 NOTE — ASSESSMENT & PLAN NOTE
Reviewed with the patient his triglyceride reading I recommend continuation of care with concentrated sugar consumption as well as carbohydrate consumption  Follow-up testing in 4 months is recommended he should continue his Omega 3 fatty acid consumption

## 2020-08-07 ENCOUNTER — TELEPHONE (OUTPATIENT)
Dept: INTERNAL MEDICINE CLINIC | Facility: CLINIC | Age: 75
End: 2020-08-07

## 2020-08-14 ENCOUNTER — DOCUMENTATION (OUTPATIENT)
Dept: PULMONOLOGY | Facility: CLINIC | Age: 75
End: 2020-08-14

## 2020-08-14 NOTE — PROGRESS NOTES
COVID Pre-Visit Screening     1  Is this a family member screening? {YES  2  Have you traveled outside of your state in the past 2 weeks? No  3  Do you presently have a fever or flu-like symptoms? NO  4  Do you have symptoms of an upper respiratory infection like runny nose, sore throat, or cough? NO  5  Are you suffering from new headache that you have not had in the past?  NO  6  Do you have/have you experienced any new shortness of breath recently? NO  7  Do you have any new diarrhea, nausea or vomiting? NO  8  Have you been in contact with anyone who has been sick or diagnosed with COVID-19? NO  9  Do you have any new loss of taste or smell? NO  10  Are you able to wear a mask without a valve for the entire visit?  {YES

## 2020-08-17 ENCOUNTER — OFFICE VISIT (OUTPATIENT)
Dept: PULMONOLOGY | Facility: CLINIC | Age: 75
End: 2020-08-17
Payer: MEDICARE

## 2020-08-17 VITALS
SYSTOLIC BLOOD PRESSURE: 128 MMHG | WEIGHT: 192 LBS | HEART RATE: 74 BPM | BODY MASS INDEX: 28.44 KG/M2 | DIASTOLIC BLOOD PRESSURE: 64 MMHG | HEIGHT: 69 IN | OXYGEN SATURATION: 93 % | TEMPERATURE: 97.9 F

## 2020-08-17 DIAGNOSIS — G47.33 OSA (OBSTRUCTIVE SLEEP APNEA): Primary | ICD-10-CM

## 2020-08-17 DIAGNOSIS — R06.02 SOB (SHORTNESS OF BREATH): ICD-10-CM

## 2020-08-17 PROCEDURE — 99213 OFFICE O/P EST LOW 20 MIN: CPT | Performed by: INTERNAL MEDICINE

## 2020-08-17 PROCEDURE — 3074F SYST BP LT 130 MM HG: CPT | Performed by: INTERNAL MEDICINE

## 2020-08-17 PROCEDURE — 4040F PNEUMOC VAC/ADMIN/RCVD: CPT | Performed by: INTERNAL MEDICINE

## 2020-08-17 PROCEDURE — 3078F DIAST BP <80 MM HG: CPT | Performed by: INTERNAL MEDICINE

## 2020-08-17 PROCEDURE — 1160F RVW MEDS BY RX/DR IN RCRD: CPT | Performed by: INTERNAL MEDICINE

## 2020-08-17 PROCEDURE — 1036F TOBACCO NON-USER: CPT | Performed by: INTERNAL MEDICINE

## 2020-08-18 NOTE — ASSESSMENT & PLAN NOTE
Probably multifactorial in origin, there may be a component of pulmonary hypertension associated with SKYLER, some with left-sided heart disease, and if he gets a left heart cath, a right heart cath may have some value as well

## 2020-08-18 NOTE — ASSESSMENT & PLAN NOTE
He remains compliant with CPAP 100% of nights (18 total nights used) and his AHI is down to 7 on current pressure settings  His auto-PAP is titrating to around 13 on average  Continue current therapy

## 2020-08-18 NOTE — PROGRESS NOTES
Pulmonary Follow Up Note   Rasheed Fernandez  66 y o  male MRN: [id number]  8/17/2020      Assessment:    SKYLER (obstructive sleep apnea)  He remains compliant with CPAP 100% of nights (18 total nights used) and his AHI is down to 7 on current pressure settings  His auto-PAP is titrating to around 13 on average  Continue current therapy  SOB (shortness of breath)  Probably multifactorial in origin, there may be a component of pulmonary hypertension associated with SKYELR, some with left-sided heart disease, and if he gets a left heart cath, a right heart cath may have some value as well  Plan:    Diagnoses and all orders for this visit:    SKYLER (obstructive sleep apnea)    SOB (shortness of breath)      Return in about 6 months (around 2/17/2021)  History of Present Illness   HPI:  Rasheed Fernandez  is a 66 y o  male who returns for follow of up of sleep apnea and chronic dyspnea  The SKYLER has been treated with CPAP for the last 3 weeks which he is doing well with  A compliance report revealed his AHI dropped from 45 to 6 7, within acceptable treatment goals  Regarding dyspnea, he is no better or worse than usual, but he has learned to live with a few limitations  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  A stress test and/or heart catheterization are potentially being considered by his Cardiologist   He did endorse that at one point, at rest, he developed severe left arm pain at rest which concerned him for a myocardial infarction  He has never tried a rescue inhaler for any reason, but has no obstructive lung disease and I advised him that the likelihood it would help him was low, and he wasn't overly interested in trying one  Review of Systems   Respiratory: Positive for shortness of breath  Negative for cough and wheezing  All other systems reviewed and are negative        Historical Information   Past Medical History:   Diagnosis Date    Elevated liver enzymes     Hypertension  TIA (transient ischemic attack)      Past Surgical History:   Procedure Laterality Date    CARDIAC SURGERY       Family History   Problem Relation Age of Onset    Heart failure Mother     Heart failure Father     Stroke Brother      Meds/Allergies     Current Outpatient Medications:     aspirin 81 MG tablet, Take 81 mg by mouth, Disp: , Rfl:     celecoxib (CeleBREX) 200 mg capsule, Take 200 mg by mouth daily With a meal, Disp: , Rfl: 3    diltiazem (DILTIAZEM CD) 120 mg 24 hr capsule, Take 120 mg by mouth, Disp: , Rfl:     escitalopram (LEXAPRO) 10 mg tablet, Take 1 tablet (10 mg total) by mouth daily, Disp: 90 tablet, Rfl: 2    ezetimibe (ZETIA) 10 mg tablet, Take 1 tablet (10 mg total) by mouth daily, Disp: 30 tablet, Rfl: 5    lisinopril (ZESTRIL) 20 mg tablet, , Disp: , Rfl:     Multiple Vitamins-Minerals (CENTRUM SILVER PO), Take by mouth, Disp: , Rfl:     nitroglycerin (NITROSTAT) 0 4 mg SL tablet, Place 1 tablet under the tongue every 5 (five) minutes as needed, Disp: , Rfl:     nystatin (MYCOSTATIN) powder, Apply topically 2 (two) times a day, Disp: 30 g, Rfl: 3    Omega-3 Fatty Acids (FISH OIL) 1200 MG CAPS, Take by mouth, Disp: , Rfl:     rosuvastatin (CRESTOR) 20 MG tablet, Take 1 tablet (20 mg total) by mouth daily, Disp: 90 tablet, Rfl: 3    sildenafil (VIAGRA) 100 mg tablet, Take 100 mg by mouth, Disp: , Rfl:     warfarin (COUMADIN) 5 mg tablet, Take 5 mg by mouth, Disp: , Rfl:   No Known Allergies    Vitals: Blood pressure 128/64, pulse 74, temperature 97 9 °F (36 6 °C), temperature source Tympanic, height 5' 9" (1 753 m), weight 87 1 kg (192 lb), SpO2 93 %  Body mass index is 28 35 kg/m²  Oxygen Therapy  SpO2: 93 %  Oxygen Therapy: None (Room air)      Physical Exam  Physical Exam  Vitals signs reviewed  Constitutional:       General: He is not in acute distress  Appearance: He is well-developed  HENT:      Head: Normocephalic and atraumatic        Mouth/Throat: Pharynx: No oropharyngeal exudate  Eyes:      Conjunctiva/sclera: Conjunctivae normal       Pupils: Pupils are equal, round, and reactive to light  Neck:      Musculoskeletal: Neck supple  Thyroid: No thyromegaly  Vascular: No JVD  Cardiovascular:      Rate and Rhythm: Normal rate and regular rhythm  Heart sounds: Normal heart sounds  No murmur  No friction rub  No gallop  Pulmonary:      Effort: Pulmonary effort is normal  No respiratory distress  Breath sounds: Normal breath sounds  No wheezing or rales  Musculoskeletal:         General: No tenderness  Lymphadenopathy:      Cervical: No cervical adenopathy  Skin:     General: Skin is warm and dry  Findings: No rash  Neurological:      Mental Status: He is alert and oriented to person, place, and time  Labs: I have personally reviewed pertinent lab results  Lab Results   Component Value Date    WBC 6 49 07/10/2020    HGB 16 0 07/10/2020    HCT 47 6 07/10/2020    MCV 97 07/10/2020     07/10/2020     Lab Results   Component Value Date    GLUCOSE 99 12/18/2019    CALCIUM 9 9 07/10/2020     (H) 09/26/2015    K 4 2 07/10/2020    CO2 29 07/10/2020     (H) 07/10/2020    BUN 16 07/10/2020    CREATININE 1 01 07/10/2020     No results found for: IGE  Lab Results   Component Value Date    ALT 39 07/10/2020    AST 30 07/10/2020    ALKPHOS 78 07/10/2020    BILITOT 0 53 08/24/2014         Imaging and other studies: I have personally reviewed pertinent films in PACS    EKG, Pathology, and Other Studies: I have personally reviewed pertinent films in PACS    [facility]

## 2020-09-01 DIAGNOSIS — F41.9 ANXIETY: ICD-10-CM

## 2020-09-02 RX ORDER — ESCITALOPRAM OXALATE 10 MG/1
TABLET ORAL
Qty: 90 TABLET | Refills: 2 | Status: SHIPPED | OUTPATIENT
Start: 2020-09-02 | End: 2020-09-14 | Stop reason: SDUPTHER

## 2020-09-14 DIAGNOSIS — F41.9 ANXIETY: ICD-10-CM

## 2020-09-14 RX ORDER — ESCITALOPRAM OXALATE 10 MG/1
10 TABLET ORAL DAILY
Qty: 90 TABLET | Refills: 2 | Status: SHIPPED | OUTPATIENT
Start: 2020-09-14 | End: 2021-04-16

## 2020-10-06 ENCOUNTER — OFFICE VISIT (OUTPATIENT)
Dept: PULMONOLOGY | Facility: CLINIC | Age: 75
End: 2020-10-06
Payer: MEDICARE

## 2020-10-06 VITALS
HEART RATE: 83 BPM | DIASTOLIC BLOOD PRESSURE: 62 MMHG | BODY MASS INDEX: 27.46 KG/M2 | SYSTOLIC BLOOD PRESSURE: 118 MMHG | OXYGEN SATURATION: 95 % | TEMPERATURE: 97.3 F | HEIGHT: 70 IN | WEIGHT: 191.8 LBS

## 2020-10-06 DIAGNOSIS — R06.02 SOB (SHORTNESS OF BREATH): ICD-10-CM

## 2020-10-06 DIAGNOSIS — G47.33 OSA (OBSTRUCTIVE SLEEP APNEA): Primary | ICD-10-CM

## 2020-10-06 DIAGNOSIS — R06.81 CENTRAL APNEA: ICD-10-CM

## 2020-10-06 PROCEDURE — 99213 OFFICE O/P EST LOW 20 MIN: CPT | Performed by: INTERNAL MEDICINE

## 2020-10-06 RX ORDER — ALBUTEROL SULFATE 90 UG/1
2 AEROSOL, METERED RESPIRATORY (INHALATION) EVERY 6 HOURS PRN
Qty: 18 G | Refills: 0 | Status: SHIPPED | OUTPATIENT
Start: 2020-10-06 | End: 2022-01-18

## 2020-10-07 ENCOUNTER — DOCUMENTATION (OUTPATIENT)
Dept: PULMONOLOGY | Facility: CLINIC | Age: 75
End: 2020-10-07

## 2020-10-11 ENCOUNTER — TRANSCRIBE ORDERS (OUTPATIENT)
Dept: LAB | Facility: HOSPITAL | Age: 75
End: 2020-10-11

## 2020-10-11 ENCOUNTER — APPOINTMENT (OUTPATIENT)
Dept: LAB | Facility: HOSPITAL | Age: 75
End: 2020-10-11
Attending: INTERNAL MEDICINE
Payer: MEDICARE

## 2020-10-11 DIAGNOSIS — Z79.899 ENCOUNTER FOR LONG-TERM (CURRENT) USE OF OTHER MEDICATIONS: ICD-10-CM

## 2020-10-11 DIAGNOSIS — E78.00 PURE HYPERCHOLESTEROLEMIA: ICD-10-CM

## 2020-10-11 DIAGNOSIS — E78.00 HYPERCHOLESTEROLEMIA: ICD-10-CM

## 2020-10-11 DIAGNOSIS — E78.00 PURE HYPERCHOLESTEROLEMIA: Primary | ICD-10-CM

## 2020-10-11 LAB
ALBUMIN SERPL BCP-MCNC: 3.5 G/DL (ref 3.5–5)
ALP SERPL-CCNC: 69 U/L (ref 46–116)
ALT SERPL W P-5'-P-CCNC: 48 U/L (ref 12–78)
ANION GAP SERPL CALCULATED.3IONS-SCNC: 5 MMOL/L (ref 4–13)
AST SERPL W P-5'-P-CCNC: 29 U/L (ref 5–45)
BILIRUB DIRECT SERPL-MCNC: 0.3 MG/DL (ref 0–0.2)
BILIRUB SERPL-MCNC: 1.06 MG/DL (ref 0.2–1)
BUN SERPL-MCNC: 17 MG/DL (ref 5–25)
CALCIUM SERPL-MCNC: 9.1 MG/DL (ref 8.3–10.1)
CHLORIDE SERPL-SCNC: 109 MMOL/L (ref 100–108)
CHOLEST SERPL-MCNC: 140 MG/DL (ref 50–200)
CO2 SERPL-SCNC: 28 MMOL/L (ref 21–32)
CREAT SERPL-MCNC: 1.09 MG/DL (ref 0.6–1.3)
GFR SERPL CREATININE-BSD FRML MDRD: 65 ML/MIN/1.73SQ M
GLUCOSE P FAST SERPL-MCNC: 91 MG/DL (ref 65–99)
HDLC SERPL-MCNC: 50 MG/DL
LDLC SERPL CALC-MCNC: 72 MG/DL (ref 0–100)
LDLC SERPL DIRECT ASSAY-MCNC: 78 MG/DL (ref 0–100)
NONHDLC SERPL-MCNC: 90 MG/DL
POTASSIUM SERPL-SCNC: 4 MMOL/L (ref 3.5–5.3)
PROT SERPL-MCNC: 7.2 G/DL (ref 6.4–8.2)
SODIUM SERPL-SCNC: 142 MMOL/L (ref 136–145)
TRIGL SERPL-MCNC: 92 MG/DL

## 2020-10-11 PROCEDURE — 82248 BILIRUBIN DIRECT: CPT

## 2020-10-11 PROCEDURE — 80061 LIPID PANEL: CPT

## 2020-10-11 PROCEDURE — 80053 COMPREHEN METABOLIC PANEL: CPT

## 2020-10-11 PROCEDURE — 36415 COLL VENOUS BLD VENIPUNCTURE: CPT

## 2020-10-11 PROCEDURE — 83721 ASSAY OF BLOOD LIPOPROTEIN: CPT

## 2020-10-20 DIAGNOSIS — E78.00 HYPERCHOLESTEROLEMIA: ICD-10-CM

## 2020-10-20 RX ORDER — ROSUVASTATIN CALCIUM 20 MG/1
TABLET, COATED ORAL
Qty: 90 TABLET | Refills: 3 | Status: SHIPPED | OUTPATIENT
Start: 2020-10-20 | End: 2021-09-13

## 2020-12-29 ENCOUNTER — OFFICE VISIT (OUTPATIENT)
Dept: INTERNAL MEDICINE CLINIC | Facility: CLINIC | Age: 75
End: 2020-12-29
Payer: MEDICARE

## 2020-12-29 VITALS
DIASTOLIC BLOOD PRESSURE: 70 MMHG | WEIGHT: 190.8 LBS | HEIGHT: 70 IN | TEMPERATURE: 96.8 F | SYSTOLIC BLOOD PRESSURE: 128 MMHG | HEART RATE: 77 BPM | BODY MASS INDEX: 27.32 KG/M2 | RESPIRATION RATE: 16 BRPM | OXYGEN SATURATION: 96 %

## 2020-12-29 DIAGNOSIS — E78.00 HYPERCHOLESTEROLEMIA: ICD-10-CM

## 2020-12-29 DIAGNOSIS — F41.9 ANXIETY: ICD-10-CM

## 2020-12-29 DIAGNOSIS — I10 ESSENTIAL HYPERTENSION: Primary | ICD-10-CM

## 2020-12-29 DIAGNOSIS — R73.09 ABNORMAL GLUCOSE: ICD-10-CM

## 2020-12-29 DIAGNOSIS — I25.10 CAD IN NATIVE ARTERY: ICD-10-CM

## 2020-12-29 DIAGNOSIS — G47.33 OSA (OBSTRUCTIVE SLEEP APNEA): ICD-10-CM

## 2020-12-29 DIAGNOSIS — E78.1 HYPERTRIGLYCERIDEMIA: ICD-10-CM

## 2020-12-29 PROCEDURE — 99215 OFFICE O/P EST HI 40 MIN: CPT | Performed by: INTERNAL MEDICINE

## 2021-01-23 ENCOUNTER — IMMUNIZATIONS (OUTPATIENT)
Dept: FAMILY MEDICINE CLINIC | Facility: HOSPITAL | Age: 76
End: 2021-01-23

## 2021-01-23 DIAGNOSIS — Z23 ENCOUNTER FOR IMMUNIZATION: Primary | ICD-10-CM

## 2021-01-23 PROCEDURE — 0011A SARS-COV-2 / COVID-19 MRNA VACCINE (MODERNA) 100 MCG: CPT

## 2021-01-23 PROCEDURE — 91301 SARS-COV-2 / COVID-19 MRNA VACCINE (MODERNA) 100 MCG: CPT

## 2021-02-19 ENCOUNTER — IMMUNIZATIONS (OUTPATIENT)
Dept: FAMILY MEDICINE CLINIC | Facility: HOSPITAL | Age: 76
End: 2021-02-19

## 2021-02-19 DIAGNOSIS — Z23 ENCOUNTER FOR IMMUNIZATION: Primary | ICD-10-CM

## 2021-02-19 PROCEDURE — 0012A SARS-COV-2 / COVID-19 MRNA VACCINE (MODERNA) 100 MCG: CPT

## 2021-02-19 PROCEDURE — 91301 SARS-COV-2 / COVID-19 MRNA VACCINE (MODERNA) 100 MCG: CPT

## 2021-03-26 ENCOUNTER — APPOINTMENT (OUTPATIENT)
Dept: LAB | Age: 76
End: 2021-03-26
Payer: MEDICARE

## 2021-03-26 DIAGNOSIS — E78.00 HYPERCHOLESTEROLEMIA: ICD-10-CM

## 2021-03-26 DIAGNOSIS — I10 ESSENTIAL HYPERTENSION: ICD-10-CM

## 2021-03-26 LAB
ALBUMIN SERPL BCP-MCNC: 3.9 G/DL (ref 3.5–5)
ALP SERPL-CCNC: 82 U/L (ref 46–116)
ALT SERPL W P-5'-P-CCNC: 38 U/L (ref 12–78)
ANION GAP SERPL CALCULATED.3IONS-SCNC: 4 MMOL/L (ref 4–13)
AST SERPL W P-5'-P-CCNC: 27 U/L (ref 5–45)
BILIRUB SERPL-MCNC: 0.69 MG/DL (ref 0.2–1)
BUN SERPL-MCNC: 14 MG/DL (ref 5–25)
CALCIUM SERPL-MCNC: 9.6 MG/DL (ref 8.3–10.1)
CHLORIDE SERPL-SCNC: 107 MMOL/L (ref 100–108)
CHOLEST SERPL-MCNC: 140 MG/DL (ref 50–200)
CO2 SERPL-SCNC: 29 MMOL/L (ref 21–32)
CREAT SERPL-MCNC: 1.03 MG/DL (ref 0.6–1.3)
GFR SERPL CREATININE-BSD FRML MDRD: 70 ML/MIN/1.73SQ M
GLUCOSE P FAST SERPL-MCNC: 86 MG/DL (ref 65–99)
HDLC SERPL-MCNC: 45 MG/DL
LDLC SERPL CALC-MCNC: 69 MG/DL (ref 0–100)
NONHDLC SERPL-MCNC: 95 MG/DL
POTASSIUM SERPL-SCNC: 4.2 MMOL/L (ref 3.5–5.3)
PROT SERPL-MCNC: 7.6 G/DL (ref 6.4–8.2)
SODIUM SERPL-SCNC: 140 MMOL/L (ref 136–145)
TRIGL SERPL-MCNC: 129 MG/DL

## 2021-03-26 PROCEDURE — 36415 COLL VENOUS BLD VENIPUNCTURE: CPT

## 2021-03-26 PROCEDURE — 80061 LIPID PANEL: CPT

## 2021-03-26 PROCEDURE — 80053 COMPREHEN METABOLIC PANEL: CPT

## 2021-04-01 ENCOUNTER — TELEPHONE (OUTPATIENT)
Dept: PULMONOLOGY | Facility: CLINIC | Age: 76
End: 2021-04-01

## 2021-04-01 NOTE — TELEPHONE ENCOUNTER
Patient calling stating [address] called him notifying that his vents from his CPAP are too high and patient isn't feeling well due to this    [phone]

## 2021-04-07 DIAGNOSIS — G47.33 OSA (OBSTRUCTIVE SLEEP APNEA): Primary | ICD-10-CM

## 2021-04-07 NOTE — TELEPHONE ENCOUNTER
Patient calling stating his CPAP events per hour are climbing  Patient would like a phone call back     Please advise   [phone]

## 2021-04-07 NOTE — TELEPHONE ENCOUNTER
Reviewed compliant with Dr Elsie Clifford-  Average  Pressure is higher 12 so we will make pressure change to make bridge    -  Pressure changed ordered 10-20,  Please fax

## 2021-04-13 ENCOUNTER — TRANSCRIBE ORDERS (OUTPATIENT)
Dept: ADMINISTRATIVE | Age: 76
End: 2021-04-13

## 2021-04-13 ENCOUNTER — APPOINTMENT (OUTPATIENT)
Dept: LAB | Age: 76
End: 2021-04-13
Payer: MEDICARE

## 2021-04-13 DIAGNOSIS — I25.10 ATHEROSCLEROSIS OF NATIVE CORONARY ARTERY WITHOUT ANGINA PECTORIS, UNSPECIFIED WHETHER NATIVE OR TRANSPLANTED HEART: ICD-10-CM

## 2021-04-13 DIAGNOSIS — R94.39 ABNORMAL BALLISTOCARDIOGRAM: Primary | ICD-10-CM

## 2021-04-13 DIAGNOSIS — R94.39 ABNORMAL BALLISTOCARDIOGRAM: ICD-10-CM

## 2021-04-13 LAB
ANION GAP SERPL CALCULATED.3IONS-SCNC: 7 MMOL/L (ref 4–13)
BUN SERPL-MCNC: 12 MG/DL (ref 5–25)
CALCIUM SERPL-MCNC: 9.4 MG/DL (ref 8.3–10.1)
CHLORIDE SERPL-SCNC: 110 MMOL/L (ref 100–108)
CO2 SERPL-SCNC: 25 MMOL/L (ref 21–32)
CREAT SERPL-MCNC: 0.99 MG/DL (ref 0.6–1.3)
ERYTHROCYTE [DISTWIDTH] IN BLOOD BY AUTOMATED COUNT: 12.4 % (ref 11.6–15.1)
GFR SERPL CREATININE-BSD FRML MDRD: 73 ML/MIN/1.73SQ M
GLUCOSE P FAST SERPL-MCNC: 85 MG/DL (ref 65–99)
HCT VFR BLD AUTO: 46.8 % (ref 36.5–49.3)
HGB BLD-MCNC: 15.9 G/DL (ref 12–17)
MCH RBC QN AUTO: 32.8 PG (ref 26.8–34.3)
MCHC RBC AUTO-ENTMCNC: 34 G/DL (ref 31.4–37.4)
MCV RBC AUTO: 97 FL (ref 82–98)
PLATELET # BLD AUTO: 211 THOUSANDS/UL (ref 149–390)
PMV BLD AUTO: 9.8 FL (ref 8.9–12.7)
POTASSIUM SERPL-SCNC: 3.8 MMOL/L (ref 3.5–5.3)
RBC # BLD AUTO: 4.85 MILLION/UL (ref 3.88–5.62)
SODIUM SERPL-SCNC: 142 MMOL/L (ref 136–145)
WBC # BLD AUTO: 7.21 THOUSAND/UL (ref 4.31–10.16)

## 2021-04-13 PROCEDURE — 85027 COMPLETE CBC AUTOMATED: CPT

## 2021-04-13 PROCEDURE — 36415 COLL VENOUS BLD VENIPUNCTURE: CPT

## 2021-04-13 PROCEDURE — 80048 BASIC METABOLIC PNL TOTAL CA: CPT

## 2021-04-16 DIAGNOSIS — F41.9 ANXIETY: ICD-10-CM

## 2021-04-16 RX ORDER — ESCITALOPRAM OXALATE 10 MG/1
TABLET ORAL
Qty: 90 TABLET | Refills: 2 | Status: SHIPPED | OUTPATIENT
Start: 2021-04-16 | End: 2021-11-29

## 2021-05-04 ENCOUNTER — OFFICE VISIT (OUTPATIENT)
Dept: INTERNAL MEDICINE CLINIC | Facility: CLINIC | Age: 76
End: 2021-05-04
Payer: MEDICARE

## 2021-05-04 VITALS
SYSTOLIC BLOOD PRESSURE: 122 MMHG | DIASTOLIC BLOOD PRESSURE: 76 MMHG | OXYGEN SATURATION: 97 % | HEART RATE: 76 BPM | BODY MASS INDEX: 28.18 KG/M2 | WEIGHT: 196.8 LBS | HEIGHT: 70 IN

## 2021-05-04 DIAGNOSIS — E78.1 HYPERTRIGLYCERIDEMIA: ICD-10-CM

## 2021-05-04 DIAGNOSIS — I25.10 CAD IN NATIVE ARTERY: ICD-10-CM

## 2021-05-04 DIAGNOSIS — R73.09 ABNORMAL GLUCOSE: ICD-10-CM

## 2021-05-04 DIAGNOSIS — R06.81 CENTRAL APNEA: ICD-10-CM

## 2021-05-04 DIAGNOSIS — E78.00 HYPERCHOLESTEROLEMIA: ICD-10-CM

## 2021-05-04 DIAGNOSIS — R06.02 SOB (SHORTNESS OF BREATH): ICD-10-CM

## 2021-05-04 DIAGNOSIS — Z12.5 PROSTATE CANCER SCREENING: ICD-10-CM

## 2021-05-04 DIAGNOSIS — I10 ESSENTIAL HYPERTENSION: Primary | ICD-10-CM

## 2021-05-04 PROCEDURE — 99214 OFFICE O/P EST MOD 30 MIN: CPT | Performed by: INTERNAL MEDICINE

## 2021-05-04 RX ORDER — ISOSORBIDE MONONITRATE 30 MG/1
30 TABLET, EXTENDED RELEASE ORAL DAILY
COMMUNITY
Start: 2021-04-27 | End: 2022-05-17

## 2021-05-04 NOTE — ASSESSMENT & PLAN NOTE
The patient's weight today was reviewed and discussed in detail he has gained approximately 10 lb over the past year his current body mass index is 28 24  A believe this weight gain is of major factor in his shortness of breath as well as a decrease effectiveness of his CPAP machine I have strongly recommended that he decrease his consumption of concentrated sugars and carbohydrates    A reduction of total calorie consumption by 25% and regular exercise are recommended

## 2021-05-04 NOTE — ASSESSMENT & PLAN NOTE
Ongoing shortness of breath reviewed with the patient I suspect his 10 lb weight gain significant factor in his shortness of breath at this time recommend attempts to reduce calorie consumption to reduce weight by at least 10 lb over the summer

## 2021-05-04 NOTE — PROGRESS NOTES
Assessment/Plan:    Essential hypertension   During today's assessment of the patient's hypertension we find his blood pressure to be under good control with a reading of 122/76  I have recommended that he continue on his lisinopril at 20 mg daily  I have review of his metabolic profile indicates no electrolyte imbalance nor any evidence of kidney side effects  CAD in native artery    Today I have reviewed his they patient's most recent visit with his cardiologist as well as his recent stress test and cardiac catheterization  On the recommendation of his cardiologist he was started on isosorbide mononitrate 30 mg daily he reports no side effects of the medication  He continues on low-dose aspirin at 81 mg daily and statin therapy for prevention of progressive coronary artery disease period he also continues on Zetia 10 mg daily a follow-up of the evaluation of his blood work is recommended in 4 months    Abnormal glucose   Evaluation the patient's fasting blood sugar indicates a reading of 85 which is a good control of his blood fasting blood sugar he is encouraged to continue with a diet that is controlled in consumption of concentrated sugars and carbohydrates    Over weight   The patient's weight today was reviewed and discussed in detail he has gained approximately 10 lb over the past year his current body mass index is 28 24  A believe this weight gain is of major factor in his shortness of breath as well as a decrease effectiveness of his CPAP machine I have strongly recommended that he decrease his consumption of concentrated sugars and carbohydrates    A reduction of total calorie consumption by 25% and regular exercise are recommended    SOB (shortness of breath)   Ongoing shortness of breath reviewed with the patient I suspect his 10 lb weight gain significant factor in his shortness of breath at this time recommend attempts to reduce calorie consumption to reduce weight by at least 10 lb over the summer  Central apnea    Central apnea reviewed with the patient he has gained 10 lb which is decreased some of the effectiveness of his CPAP machine we will work on ring do sink calorie consumption to reduce weight continue CPAP on nightly basis       Diagnoses and all orders for this visit:    Essential hypertension  -     Comprehensive metabolic panel; Future    CAD in native artery    Abnormal glucose  -     Comprehensive metabolic panel; Future    Hypercholesterolemia  -     Lipid panel; Future    Hypertriglyceridemia    Prostate cancer screening  -     PSA, Total Screen; Future    Other orders  -     isosorbide mononitrate (IMDUR) 30 mg 24 hr tablet; Take 30 mg by mouth daily        Subjective:      Patient ID: Kanwal Arce  is a 66 y o  male  This 41-year-old gentleman returns today for a for routine 4 month follow-up visit  His primary complaint today is that of ongoing shortness of breath  He has recently undergone stress testing followed by cardiac catheterization  We reviewed the results of the cardiac catheterization which indicates that the patient's previously placed bypasses are all functioning well  He has a normal ejection fraction for his left ventricle at 60-65% and no evidence of regional wall abnormalities in the left ventricle  There does not appear to be any evidence of pulmonary hypertension  We reviewed the patient's previous consultation with his pulmonologist regarding his shortness of breath  I suspect that part of his problem is the 10 lb or so that he has gained over the past year  He acknowledges that he has had a hard time keeping his weight down he remains active but his weight has been gradually increasing over the past year  His sleep apnea has not been as effective as it was in the past again I suspect this may be related to his 10 lb weight gain      During today's visit we have reviewed his blood work which fortunately shows a normal blood sugar and consistent control of his hyperlipidemia and hypertriglyceridemia  The patient has had no chest pains or palpitations and has had no signs or symptoms of COVID 19 he has received both of his COVID-19 vaccines      The following portions of the patient's history were reviewed and updated as appropriate:   He  has a past medical history of Elevated liver enzymes, Hypertension, and TIA (transient ischemic attack)  He   Patient Active Problem List    Diagnosis Date Noted    Central apnea     SKYLER (obstructive sleep apnea)     Snoring 12/20/2019    SOB (shortness of breath) 12/10/2019    Anxiety 12/10/2019    Supraventricular tachycardia ([address] ) 12/19/2018    Renal insufficiency 07/31/2018    Essential hypertension 04/11/2018    Abnormal glucose 04/11/2018    Over weight 04/11/2018    Anticoagulant long-term use 10/11/2017    ED (erectile dysfunction) 08/10/2017    Hypercholesterolemia 08/10/2017    Hypertriglyceridemia 09/12/2016    CAD in native artery 05/12/2016    TIA (transient ischemic attack) 02/24/2016    Lumbar canal stenosis 09/13/2013     He  has a past surgical history that includes Cardiac surgery  His family history includes Heart failure in his father and mother; Stroke in his brother  He  reports that he has never smoked  He has never used smokeless tobacco  He reports that he does not drink alcohol or use drugs    Current Outpatient Medications   Medication Sig Dispense Refill    albuterol (Ventolin HFA) 90 mcg/act inhaler Inhale 2 puffs every 6 (six) hours as needed for wheezing 18 g 0    aspirin 81 MG tablet Take 81 mg by mouth      celecoxib (CeleBREX) 200 mg capsule Take 200 mg by mouth daily With a meal  3    diltiazem (DILTIAZEM CD) 120 mg 24 hr capsule Take 120 mg by mouth      escitalopram (LEXAPRO) 10 mg tablet TAKE 1 TABLET BY MOUTH  DAILY 90 tablet 2    ezetimibe (ZETIA) 10 mg tablet Take 1 tablet (10 mg total) by mouth daily 30 tablet 5    isosorbide mononitrate (IMDUR) 30 mg 24 hr tablet Take 30 mg by mouth daily      lisinopril (ZESTRIL) 20 mg tablet       Multiple Vitamins-Minerals (CENTRUM SILVER PO) Take by mouth      nitroglycerin (NITROSTAT) 0 4 mg SL tablet Place 1 tablet under the tongue every 5 (five) minutes as needed      nystatin (MYCOSTATIN) powder Apply topically 2 (two) times a day 30 g 3    Omega-3 Fatty Acids (FISH OIL) 1200 MG CAPS Take by mouth      rosuvastatin (CRESTOR) 20 MG tablet TAKE 1 TABLET BY MOUTH  DAILY 90 tablet 3    sildenafil (VIAGRA) 100 mg tablet Take 100 mg by mouth      warfarin (COUMADIN) 5 mg tablet Take 5 mg by mouth       No current facility-administered medications for this visit       Review of Systems   Constitutional: Positive for fatigue  Respiratory: Positive for shortness of breath  All other systems reviewed and are negative  Objective:      /76   Pulse 76   Ht 5' 10" (1 778 m)   Wt 89 3 kg (196 lb 12 8 oz)   SpO2 97%   BMI 28 24 kg/m²          Physical Exam  Constitutional:       General: He is not in acute distress  Appearance: He is well-developed  He is not ill-appearing  HENT:      Right Ear: Hearing and external ear normal       Left Ear: Hearing and external ear normal       Nose: Nose normal    Eyes:      Conjunctiva/sclera: Conjunctivae normal       Pupils: Pupils are equal, round, and reactive to light  Neck:      Musculoskeletal: No neck rigidity or muscular tenderness  Thyroid: No thyromegaly  Cardiovascular:      Rate and Rhythm: Normal rate and regular rhythm  Heart sounds: Normal heart sounds, S1 normal and S2 normal  No murmur  Pulmonary:      Effort: Pulmonary effort is normal  No respiratory distress  Breath sounds: Normal breath sounds  No wheezing, rhonchi or rales  Abdominal:      General: Bowel sounds are normal       Palpations: Abdomen is soft  Tenderness: There is no abdominal tenderness  Musculoskeletal: Normal range of motion  Right lower leg: No edema  Left lower leg: No edema  Lymphadenopathy:      Cervical: No cervical adenopathy  Skin:     General: Skin is warm and dry  Neurological:      Mental Status: He is alert and oriented to person, place, and time  Mental status is at baseline  Deep Tendon Reflexes: Reflexes are normal and symmetric  Reflexes normal    Psychiatric:         Behavior: Behavior normal          Thought Content: Thought content normal          Judgment: Judgment normal        BMI Counseling: Body mass index is 28 24 kg/m²  The BMI is above normal  Nutrition recommendations include reducing portion sizes, decreasing overall calorie intake and moderation in carbohydrate intake  Exercise recommendations include moderate aerobic physical activity for 150 minutes/week

## 2021-05-04 NOTE — ASSESSMENT & PLAN NOTE
Central apnea reviewed with the patient he has gained 10 lb which is decreased some of the effectiveness of his CPAP machine we will work on ring do sink calorie consumption to reduce weight continue CPAP on nightly basis

## 2021-05-04 NOTE — ASSESSMENT & PLAN NOTE
Evaluation the patient's fasting blood sugar indicates a reading of 85 which is a good control of his blood fasting blood sugar he is encouraged to continue with a diet that is controlled in consumption of concentrated sugars and carbohydrates

## 2021-05-04 NOTE — ASSESSMENT & PLAN NOTE
Today I have reviewed his they patient's most recent visit with his cardiologist as well as his recent stress test and cardiac catheterization  On the recommendation of his cardiologist he was started on isosorbide mononitrate 30 mg daily he reports no side effects of the medication    He continues on low-dose aspirin at 81 mg daily and statin therapy for prevention of progressive coronary artery disease period he also continues on Zetia 10 mg daily a follow-up of the evaluation of his blood work is recommended in 4 months

## 2021-05-04 NOTE — ASSESSMENT & PLAN NOTE
During today's assessment of the patient's hypertension we find his blood pressure to be under good control with a reading of 122/76  I have recommended that he continue on his lisinopril at 20 mg daily  I have review of his metabolic profile indicates no electrolyte imbalance nor any evidence of kidney side effects

## 2021-05-25 ENCOUNTER — OFFICE VISIT (OUTPATIENT)
Dept: URGENT CARE | Facility: MEDICAL CENTER | Age: 76
End: 2021-05-25
Payer: MEDICARE

## 2021-05-25 VITALS
BODY MASS INDEX: 27.92 KG/M2 | WEIGHT: 195 LBS | HEART RATE: 68 BPM | OXYGEN SATURATION: 98 % | RESPIRATION RATE: 18 BRPM | TEMPERATURE: 97.7 F | HEIGHT: 70 IN

## 2021-05-25 DIAGNOSIS — R05.9 COUGH: Primary | ICD-10-CM

## 2021-05-25 PROCEDURE — G0463 HOSPITAL OUTPT CLINIC VISIT: HCPCS | Performed by: PHYSICIAN ASSISTANT

## 2021-05-25 PROCEDURE — 99213 OFFICE O/P EST LOW 20 MIN: CPT | Performed by: PHYSICIAN ASSISTANT

## 2021-05-25 RX ORDER — BENZONATATE 100 MG/1
100 CAPSULE ORAL 3 TIMES DAILY PRN
Qty: 20 CAPSULE | Refills: 0 | Status: SHIPPED | OUTPATIENT
Start: 2021-05-25 | End: 2022-01-11

## 2021-05-25 RX ORDER — DOXYCYCLINE HYCLATE 100 MG/1
100 CAPSULE ORAL EVERY 12 HOURS SCHEDULED
Qty: 14 CAPSULE | Refills: 0 | Status: SHIPPED | OUTPATIENT
Start: 2021-05-25 | End: 2021-06-01

## 2021-05-25 NOTE — PROGRESS NOTES
[facility]        NAME: Maverick Dao  is a 66 y o  male  : 1945    MRN: [id number]  DATE: May 25, 2021  TIME: 7:42 PM    Assessment and Plan   Cough [R05]  1  Cough  benzonatate (TESSALON PERLES) 100 mg capsule    doxycycline hyclate (VIBRAMYCIN) 100 mg capsule       Patient ready COVID vaccinated  Will treat as possible bronchitis  Patient Instructions     May continue Tylenol   Tessalon Perles for cough   doxycycline as directed  Follow up with PCP in 3-5 days  Proceed to  ER if symptoms worsen  Chief Complaint     Chief Complaint   Patient presents with    Generalized Body Aches     cough ,          History of Present Illness        Patient is a 24-year-old male who comes in today with complaints of body aches, cough, congestion for the last 4-5 days  Denies any fevers or chills  Patient has already been fully COVID-19 vaccinated and has had no positive exposures recently  He does have chronic shortness of breath that is no worse than usual       Review of Systems   Review of Systems   Constitutional: Negative for chills and fever  HENT: Positive for congestion  Negative for ear pain and sore throat  Respiratory: Positive for cough and shortness of breath  Negative for wheezing  Cardiovascular: Negative for chest pain  Musculoskeletal: Positive for myalgias           Current Medications       Current Outpatient Medications:     albuterol (Ventolin HFA) 90 mcg/act inhaler, Inhale 2 puffs every 6 (six) hours as needed for wheezing, Disp: 18 g, Rfl: 0    aspirin 81 MG tablet, Take 81 mg by mouth, Disp: , Rfl:     celecoxib (CeleBREX) 200 mg capsule, Take 200 mg by mouth daily With a meal, Disp: , Rfl: 3    diltiazem (DILTIAZEM CD) 120 mg 24 hr capsule, Take 120 mg by mouth, Disp: , Rfl:     escitalopram (LEXAPRO) 10 mg tablet, TAKE 1 TABLET BY MOUTH  DAILY, Disp: 90 tablet, Rfl: 2    ezetimibe (ZETIA) 10 mg tablet, Take 1 tablet (10 mg total) by mouth daily, Disp: 30 tablet, Rfl: 5    isosorbide mononitrate (IMDUR) 30 mg 24 hr tablet, Take 30 mg by mouth daily, Disp: , Rfl:     lisinopril (ZESTRIL) 20 mg tablet, , Disp: , Rfl:     Multiple Vitamins-Minerals (CENTRUM SILVER PO), Take by mouth, Disp: , Rfl:     nitroglycerin (NITROSTAT) 0 4 mg SL tablet, Place 1 tablet under the tongue every 5 (five) minutes as needed, Disp: , Rfl:     nystatin (MYCOSTATIN) powder, Apply topically 2 (two) times a day, Disp: 30 g, Rfl: 3    Omega-3 Fatty Acids (FISH OIL) 1200 MG CAPS, Take by mouth, Disp: , Rfl:     rosuvastatin (CRESTOR) 20 MG tablet, TAKE 1 TABLET BY MOUTH  DAILY, Disp: 90 tablet, Rfl: 3    sildenafil (VIAGRA) 100 mg tablet, Take 100 mg by mouth, Disp: , Rfl:     warfarin (COUMADIN) 5 mg tablet, Take 5 mg by mouth, Disp: , Rfl:     benzonatate (TESSALON PERLES) 100 mg capsule, Take 1 capsule (100 mg total) by mouth 3 (three) times a day as needed for cough, Disp: 20 capsule, Rfl: 0    doxycycline hyclate (VIBRAMYCIN) 100 mg capsule, Take 1 capsule (100 mg total) by mouth every 12 (twelve) hours for 7 days, Disp: 14 capsule, Rfl: 0    Current Allergies     Allergies as of 05/25/2021    (No Known Allergies)            The following portions of the patient's history were reviewed and updated as appropriate: allergies, current medications, past family history, past medical history, past social history, past surgical history and problem list      Past Medical History:   Diagnosis Date    Elevated liver enzymes     Hypertension     TIA (transient ischemic attack)        Past Surgical History:   Procedure Laterality Date    CARDIAC SURGERY         Family History   Problem Relation Age of Onset    Heart failure Mother     Heart failure Father     Stroke Brother          Medications have been verified          Objective   Pulse 68   Temp 97 7 °F (36 5 °C)   Resp 18   Ht 5' 10" (1 778 m)   Wt 88 5 kg (195 lb)   SpO2 98%   BMI 27 98 kg/m²        Physical Exam Physical Exam  Constitutional:       General: He is not in acute distress  Appearance: Normal appearance  He is not ill-appearing  HENT:      Right Ear: Tympanic membrane and ear canal normal       Left Ear: Tympanic membrane and ear canal normal       Nose: Congestion present  No rhinorrhea  Mouth/Throat:      Mouth: Mucous membranes are moist       Pharynx: Oropharynx is clear  No posterior oropharyngeal erythema  Cardiovascular:      Rate and Rhythm: Normal rate and regular rhythm  Pulmonary:      Effort: Pulmonary effort is normal  No tachypnea, accessory muscle usage or respiratory distress  Breath sounds: Normal air entry  No stridor, decreased air movement or transmitted upper airway sounds  Examination of the right-lower field reveals rhonchi  Rhonchi present  No decreased breath sounds, wheezing or rales  Skin:     General: Skin is warm and dry  Neurological:      Mental Status: He is alert

## 2021-09-05 ENCOUNTER — APPOINTMENT (OUTPATIENT)
Dept: LAB | Age: 76
End: 2021-09-05
Payer: MEDICARE

## 2021-09-05 DIAGNOSIS — I10 ESSENTIAL HYPERTENSION: ICD-10-CM

## 2021-09-05 DIAGNOSIS — R73.09 ABNORMAL GLUCOSE: ICD-10-CM

## 2021-09-05 DIAGNOSIS — E78.00 HYPERCHOLESTEROLEMIA: ICD-10-CM

## 2021-09-05 DIAGNOSIS — Z12.5 PROSTATE CANCER SCREENING: ICD-10-CM

## 2021-09-05 LAB
ALBUMIN SERPL BCP-MCNC: 3.4 G/DL (ref 3.5–5)
ALP SERPL-CCNC: 79 U/L (ref 46–116)
ALT SERPL W P-5'-P-CCNC: 38 U/L (ref 12–78)
ANION GAP SERPL CALCULATED.3IONS-SCNC: 2 MMOL/L (ref 4–13)
AST SERPL W P-5'-P-CCNC: 33 U/L (ref 5–45)
BILIRUB SERPL-MCNC: 0.86 MG/DL (ref 0.2–1)
BUN SERPL-MCNC: 14 MG/DL (ref 5–25)
CALCIUM ALBUM COR SERPL-MCNC: 9.4 MG/DL (ref 8.3–10.1)
CALCIUM SERPL-MCNC: 8.9 MG/DL (ref 8.3–10.1)
CHLORIDE SERPL-SCNC: 110 MMOL/L (ref 100–108)
CHOLEST SERPL-MCNC: 115 MG/DL (ref 50–200)
CO2 SERPL-SCNC: 26 MMOL/L (ref 21–32)
CREAT SERPL-MCNC: 1.04 MG/DL (ref 0.6–1.3)
GFR SERPL CREATININE-BSD FRML MDRD: 68 ML/MIN/1.73SQ M
GLUCOSE P FAST SERPL-MCNC: 98 MG/DL (ref 65–99)
HDLC SERPL-MCNC: 39 MG/DL
LDLC SERPL CALC-MCNC: 53 MG/DL (ref 0–100)
NONHDLC SERPL-MCNC: 76 MG/DL
POTASSIUM SERPL-SCNC: 3.9 MMOL/L (ref 3.5–5.3)
PROT SERPL-MCNC: 7.4 G/DL (ref 6.4–8.2)
PSA SERPL-MCNC: 2.6 NG/ML (ref 0–4)
SODIUM SERPL-SCNC: 138 MMOL/L (ref 136–145)
TRIGL SERPL-MCNC: 115 MG/DL

## 2021-09-05 PROCEDURE — G0103 PSA SCREENING: HCPCS

## 2021-09-05 PROCEDURE — 80053 COMPREHEN METABOLIC PANEL: CPT

## 2021-09-05 PROCEDURE — 80061 LIPID PANEL: CPT

## 2021-09-05 PROCEDURE — 36415 COLL VENOUS BLD VENIPUNCTURE: CPT

## 2021-09-08 ENCOUNTER — OFFICE VISIT (OUTPATIENT)
Dept: INTERNAL MEDICINE CLINIC | Facility: CLINIC | Age: 76
End: 2021-09-08
Payer: MEDICARE

## 2021-09-08 VITALS
TEMPERATURE: 97.6 F | SYSTOLIC BLOOD PRESSURE: 166 MMHG | RESPIRATION RATE: 16 BRPM | HEIGHT: 70 IN | OXYGEN SATURATION: 96 % | HEART RATE: 72 BPM | BODY MASS INDEX: 28.75 KG/M2 | WEIGHT: 200.8 LBS | DIASTOLIC BLOOD PRESSURE: 88 MMHG

## 2021-09-08 DIAGNOSIS — E78.00 HYPERCHOLESTEROLEMIA: ICD-10-CM

## 2021-09-08 DIAGNOSIS — I25.10 CAD IN NATIVE ARTERY: ICD-10-CM

## 2021-09-08 DIAGNOSIS — Q17.8 EUSTACHIAN TUBE ANOMALY: ICD-10-CM

## 2021-09-08 DIAGNOSIS — R97.20 ABNORMAL PSA: ICD-10-CM

## 2021-09-08 DIAGNOSIS — I10 ESSENTIAL HYPERTENSION: Primary | ICD-10-CM

## 2021-09-08 DIAGNOSIS — F41.9 ANXIETY: ICD-10-CM

## 2021-09-08 DIAGNOSIS — G47.33 OSA (OBSTRUCTIVE SLEEP APNEA): ICD-10-CM

## 2021-09-08 PROCEDURE — 99215 OFFICE O/P EST HI 40 MIN: CPT | Performed by: INTERNAL MEDICINE

## 2021-09-08 PROCEDURE — 1123F ACP DISCUSS/DSCN MKR DOCD: CPT | Performed by: INTERNAL MEDICINE

## 2021-09-08 RX ORDER — DILTIAZEM HYDROCHLORIDE 120 MG/1
120 CAPSULE, EXTENDED RELEASE ORAL
COMMUNITY
Start: 2021-03-23 | End: 2022-05-17 | Stop reason: SDUPTHER

## 2021-09-08 RX ORDER — FLUTICASONE PROPIONATE 50 MCG
1 SPRAY, SUSPENSION (ML) NASAL 2 TIMES DAILY
Qty: 16 G | Refills: 2 | Status: SHIPPED | OUTPATIENT
Start: 2021-09-08 | End: 2022-01-18

## 2021-09-08 RX ORDER — TERAZOSIN 1 MG/1
2 CAPSULE ORAL DAILY
COMMUNITY
Start: 2021-08-31

## 2021-09-08 NOTE — ASSESSMENT & PLAN NOTE
Increasing PSA is noted on most recent study I have recommended a follow-up diagnostic PSA in 4 months    Patient admits to gradual decrease in urine flow strength

## 2021-09-08 NOTE — ASSESSMENT & PLAN NOTE
Review of cardiac status with the patient today indicates no symptoms of angina or palpitations  Recommend continuation of statin therapy using rosuvastatin and Zetia 10 mg daily  Lipid profile reviewed with the patient  Regular follow-up with Cardiology also advised most recent cardiology note reviewed

## 2021-09-08 NOTE — ASSESSMENT & PLAN NOTE
The patient remains compliant with his CPAP treatment for sleep apnea  Fortunately his pump was not 1 of the recalled pumps

## 2021-09-08 NOTE — ASSESSMENT & PLAN NOTE
ET tube malfunction on right side likely due to either allergies or CPAP treatment recommend trial of fluticasone nasal spray 1 puff each nostril twice a day

## 2021-09-08 NOTE — ASSESSMENT & PLAN NOTE
Cholesterol profile reviewed with the patient recommend continuation of low-cholesterol diet along with continuation of Zetia at 10 mg daily and Crestor at 20 mg daily follow-up testing in 4 months is requested

## 2021-09-08 NOTE — ASSESSMENT & PLAN NOTE
Blood pressure remains elevated on today's assessment    I recommended that he increase his Hytrin to 2 mg prior to bedtime also continue his lisinopril at 20 mg daily and diltiazem at 120 mg daily

## 2021-09-11 DIAGNOSIS — E78.00 HYPERCHOLESTEROLEMIA: ICD-10-CM

## 2021-09-13 RX ORDER — ROSUVASTATIN CALCIUM 20 MG/1
TABLET, COATED ORAL
Qty: 90 TABLET | Refills: 3 | Status: SHIPPED | OUTPATIENT
Start: 2021-09-13 | End: 2022-05-17

## 2021-10-05 ENCOUNTER — TELEPHONE (OUTPATIENT)
Dept: INTERNAL MEDICINE CLINIC | Facility: CLINIC | Age: 76
End: 2021-10-05

## 2021-10-28 ENCOUNTER — IMMUNIZATIONS (OUTPATIENT)
Dept: FAMILY MEDICINE CLINIC | Facility: MEDICAL CENTER | Age: 76
End: 2021-10-28

## 2021-10-28 DIAGNOSIS — Z23 ENCOUNTER FOR IMMUNIZATION: Primary | ICD-10-CM

## 2021-11-23 ENCOUNTER — TELEPHONE (OUTPATIENT)
Dept: PULMONOLOGY | Facility: CLINIC | Age: 76
End: 2021-11-23

## 2021-11-29 DIAGNOSIS — F41.9 ANXIETY: ICD-10-CM

## 2021-11-29 RX ORDER — ESCITALOPRAM OXALATE 10 MG/1
TABLET ORAL
Qty: 90 TABLET | Refills: 2 | Status: SHIPPED | OUTPATIENT
Start: 2021-11-29

## 2022-01-05 ENCOUNTER — APPOINTMENT (OUTPATIENT)
Dept: LAB | Age: 77
End: 2022-01-05
Payer: MEDICARE

## 2022-01-05 DIAGNOSIS — I10 ESSENTIAL HYPERTENSION, MALIGNANT: ICD-10-CM

## 2022-01-05 DIAGNOSIS — R97.20 ABNORMAL PSA: ICD-10-CM

## 2022-01-05 DIAGNOSIS — E78.00 HYPERCHOLESTEROLEMIA: ICD-10-CM

## 2022-01-05 LAB
ALBUMIN SERPL BCP-MCNC: 3.8 G/DL (ref 3.5–5)
ALP SERPL-CCNC: 90 U/L (ref 46–116)
ALT SERPL W P-5'-P-CCNC: 31 U/L (ref 12–78)
ANION GAP SERPL CALCULATED.3IONS-SCNC: 3 MMOL/L (ref 4–13)
AST SERPL W P-5'-P-CCNC: 28 U/L (ref 5–45)
BILIRUB SERPL-MCNC: 1.22 MG/DL (ref 0.2–1)
BUN SERPL-MCNC: 11 MG/DL (ref 5–25)
CALCIUM SERPL-MCNC: 9.6 MG/DL (ref 8.3–10.1)
CHLORIDE SERPL-SCNC: 110 MMOL/L (ref 100–108)
CHOLEST SERPL-MCNC: 116 MG/DL
CO2 SERPL-SCNC: 29 MMOL/L (ref 21–32)
CREAT SERPL-MCNC: 1.1 MG/DL (ref 0.6–1.3)
GFR SERPL CREATININE-BSD FRML MDRD: 63 ML/MIN/1.73SQ M
GLUCOSE P FAST SERPL-MCNC: 98 MG/DL (ref 65–99)
HDLC SERPL-MCNC: 43 MG/DL
LDLC SERPL CALC-MCNC: 57 MG/DL (ref 0–100)
NONHDLC SERPL-MCNC: 73 MG/DL
POTASSIUM SERPL-SCNC: 4.3 MMOL/L (ref 3.5–5.3)
PROT SERPL-MCNC: 7.6 G/DL (ref 6.4–8.2)
PSA SERPL-MCNC: 3.4 NG/ML (ref 0–4)
SODIUM SERPL-SCNC: 142 MMOL/L (ref 136–145)
TRIGL SERPL-MCNC: 80 MG/DL

## 2022-01-05 PROCEDURE — 80053 COMPREHEN METABOLIC PANEL: CPT

## 2022-01-05 PROCEDURE — 80061 LIPID PANEL: CPT

## 2022-01-05 PROCEDURE — 84153 ASSAY OF PSA TOTAL: CPT

## 2022-01-05 PROCEDURE — 36415 COLL VENOUS BLD VENIPUNCTURE: CPT

## 2022-01-11 ENCOUNTER — OFFICE VISIT (OUTPATIENT)
Dept: INTERNAL MEDICINE CLINIC | Facility: CLINIC | Age: 77
End: 2022-01-11
Payer: MEDICARE

## 2022-01-11 VITALS
HEIGHT: 70 IN | SYSTOLIC BLOOD PRESSURE: 138 MMHG | OXYGEN SATURATION: 96 % | BODY MASS INDEX: 28.77 KG/M2 | TEMPERATURE: 97.5 F | HEART RATE: 65 BPM | WEIGHT: 201 LBS | DIASTOLIC BLOOD PRESSURE: 78 MMHG

## 2022-01-11 DIAGNOSIS — R06.02 SOB (SHORTNESS OF BREATH): ICD-10-CM

## 2022-01-11 DIAGNOSIS — I25.10 CAD IN NATIVE ARTERY: ICD-10-CM

## 2022-01-11 DIAGNOSIS — I10 ESSENTIAL HYPERTENSION: Primary | ICD-10-CM

## 2022-01-11 DIAGNOSIS — R97.20 ABNORMAL PSA: ICD-10-CM

## 2022-01-11 DIAGNOSIS — N28.9 RENAL INSUFFICIENCY: ICD-10-CM

## 2022-01-11 DIAGNOSIS — R73.09 ABNORMAL GLUCOSE: ICD-10-CM

## 2022-01-11 DIAGNOSIS — E78.1 HYPERTRIGLYCERIDEMIA: ICD-10-CM

## 2022-01-11 DIAGNOSIS — E78.00 HYPERCHOLESTEROLEMIA: ICD-10-CM

## 2022-01-11 PROCEDURE — 99215 OFFICE O/P EST HI 40 MIN: CPT | Performed by: INTERNAL MEDICINE

## 2022-01-11 RX ORDER — OFLOXACIN 3 MG/ML
SOLUTION AURICULAR (OTIC)
COMMUNITY
Start: 2021-10-07 | End: 2022-01-18

## 2022-01-11 RX ORDER — CANDESARTAN 16 MG/1
16 TABLET ORAL DAILY
COMMUNITY
Start: 2021-11-18 | End: 2022-01-18

## 2022-01-11 RX ORDER — METHYLPREDNISOLONE 4 MG/1
TABLET ORAL
COMMUNITY
Start: 2021-10-07 | End: 2022-01-11

## 2022-01-11 NOTE — ASSESSMENT & PLAN NOTE
A review of the patient's PSA indicates a constant upward trend over the past several test   The patient did have a transurethral prostatectomy procedure performed many years ago by Dr Rock Dubose    The patient currently has some occasional delay in urination along with week urine stream   Of I have referred him on to Urology services for further evaluation of this matter of increasing PSA

## 2022-01-11 NOTE — ASSESSMENT & PLAN NOTE
Cholesterol profile reviewed with the patient in detail today recommend continuation of Zetia at 10 mg daily and Crestor at 20 mg daily for control of cholesterol along with low-cholesterol diet routine exercise and weight control also recommended follow-up testing is requested in 4 months

## 2022-01-11 NOTE — ASSESSMENT & PLAN NOTE
Patient continues to experience chronic shortness of breath  Lung fields remain clear oxygen levels on room air are normal cardiology indicates that they did not believe it is a cardiac based shortness of breath  He has been referred on to Pulmonary services at [address] [place] [address] for further evaluation  Previous CT scan of the lungs indicated no evidence of interstitial lung disease  He has worked in the [employer] for years and has inhaled of small particles of fiber of through this career  I suspect this may be a factor  The patient also carries more weight than he should and this may limit excursion of the diaphragm when he tries to take a deep breath

## 2022-01-11 NOTE — ASSESSMENT & PLAN NOTE
Blood sugars are continuing to be monitored on a regular 4 month basis  Most recent blood sugar value is 98 he is encouraged to avoid excessive carbohydrate consumption and excessive concentrated sugar consumption    Follow-up testing is requested in 4 months

## 2022-01-11 NOTE — ASSESSMENT & PLAN NOTE
Triglyceride values reviewed with the patient today recommend continuation of Crestor and Zetia as well as a diet that is restricted in concentrated sugars as well as carbohydrates follow-up testing    Is requested in 4 months

## 2022-01-11 NOTE — PROGRESS NOTES
Assessment/Plan:    Essential hypertension  Blood pressure assessment today confirms adequate control the patient's blood pressure recommend the continuation of his attic can and diltiazem for blood pressure control along with his lisinopril  Follow-up assessment is requested in 4 months  CAD in native artery  The patient denies any current symptoms of chest pain palpitations  Of I have reviewed his most recent consultation notes with Cardiology  Patient's symptoms of shortness of breath or not believed to be cardiac in nature  It is recommended that he continue current therapy to maintain stability of his cardiac status including his low-dose aspirin 81 mg daily low-cholesterol dealt diet and continuation of his Zetia and Crestor for cholesterol and triglyceride management    Renal insufficiency  Kidney evaluation includes review of the patient's creatinine and GFR on his most recent comprehensive metabolic profile  Values appear to be stable he is encouraged to avoid long-term use of nonsteroidal anti-inflammatories  Abnormal glucose  Blood sugars are continuing to be monitored on a regular 4 month basis  Most recent blood sugar value is 98 he is encouraged to avoid excessive carbohydrate consumption and excessive concentrated sugar consumption  Follow-up testing is requested in 4 months    Hypercholesterolemia  Cholesterol profile reviewed with the patient in detail today recommend continuation of Zetia at 10 mg daily and Crestor at 20 mg daily for control of cholesterol along with low-cholesterol diet routine exercise and weight control also recommended follow-up testing is requested in 4 months    Hypertriglyceridemia  Triglyceride values reviewed with the patient today recommend continuation of Crestor and Zetia as well as a diet that is restricted in concentrated sugars as well as carbohydrates follow-up testing    Is requested in 4 months    SOB (shortness of breath)  Patient continues to experience chronic shortness of breath  Lung fields remain clear oxygen levels on room air are normal cardiology indicates that they did not believe it is a cardiac based shortness of breath  He has been referred on to Pulmonary services at [address] [place] [address] for further evaluation  Previous CT scan of the lungs indicated no evidence of interstitial lung disease  He has worked in the [employer] for years and has inhaled of small particles of fiber of through this career  I suspect this may be a factor  The patient also carries more weight than he should and this may limit excursion of the diaphragm when he tries to take a deep breath  Abnormal PSA  A review of the patient's PSA indicates a constant upward trend over the past several test   The patient did have a transurethral prostatectomy procedure performed many years ago by Dr Gely Schwartz  The patient currently has some occasional delay in urination along with week urine stream   Of I have referred him on to Urology services for further evaluation of this matter of increasing PSA       Diagnoses and all orders for this visit:    Abnormal PSA  -     Ambulatory Referral to Urology; Future    Essential hypertension  -     Comprehensive metabolic panel; Future    Hypertriglyceridemia  -     Lipid panel; Future    CAD in native artery    Abnormal glucose    Hypercholesterolemia    SOB (shortness of breath)    Other orders  -     candesartan (ATACAND) 16 mg tablet; Take 16 mg by mouth daily  -     methylPREDNISolone 4 MG tablet therapy pack  -     ofloxacin (FLOXIN) 0 3 % otic solution        Subjective:      Patient ID: Pillo Shepard  is a 64 y o  male  This 68-year-old gentleman returns today for a 4 month follow-up visit  He has been experiencing puffiness below his eyes in the morning when he wears his CPAP device overnight  It is not clear as to the cause of the swelling but I believe it is most likely related to his CPAP mask    Of he has significant swelling in the tissue below is eyes when he arises in the morning this subsequently disappears through the day  He brought a picture of what he looks like in the morning to verify the condition  Recommend that he follow-up with the Pulmonary services to see if they can change masks to try to minimize this swelling  Reviewed the patient's most recent consultations with Cardiology  They do not see any indication of a cardiac cause for his shortness of breath  He has been referred on to [address] [place] [address] for further evaluation of his shortness of breath  Of there to pulmonary specialist at that hospital that his cardiologist has referred him to  Unfortunately they will not be able see him until sometime this summer  Current oxygen saturation on room air is 96% lungs are clear to auscultation and percussion bilaterally  The following portions of the patient's history were reviewed and updated as appropriate:   He  has a past medical history of Elevated liver enzymes, Hypertension, and TIA (transient ischemic attack)  He   Patient Active Problem List    Diagnosis Date Noted    Eustachian tube anomaly 09/08/2021    Abnormal PSA 09/08/2021    Central apnea     SKYLER (obstructive sleep apnea)     Snoring 12/20/2019    SOB (shortness of breath) 12/10/2019    Anxiety 12/10/2019    Supraventricular tachycardia ([address] ) 12/19/2018    Renal insufficiency 07/31/2018    Essential hypertension 04/11/2018    Abnormal glucose 04/11/2018    Over weight 04/11/2018    Anticoagulant long-term use 10/11/2017    ED (erectile dysfunction) 08/10/2017    Hypercholesterolemia 08/10/2017    Hypertriglyceridemia 09/12/2016    CAD in native artery 05/12/2016    TIA (transient ischemic attack) 02/24/2016    Lumbar canal stenosis 09/13/2013     He  has a past surgical history that includes Cardiac surgery    His family history includes Heart failure in his father and mother; Stroke in his brother  He  reports that he has never smoked  He has never used smokeless tobacco  He reports that he does not drink alcohol and does not use drugs  Current Outpatient Medications   Medication Sig Dispense Refill    albuterol (Ventolin HFA) 90 mcg/act inhaler Inhale 2 puffs every 6 (six) hours as needed for wheezing 18 g 0    aspirin 81 MG tablet Take 81 mg by mouth      candesartan (ATACAND) 16 mg tablet Take 16 mg by mouth daily      diltiazem (DILTIAZEM CD) 120 mg 24 hr capsule Take 120 mg by mouth      diltiazem (TIAZAC) 120 MG 24 hr capsule Take 120 mg by mouth      escitalopram (LEXAPRO) 10 mg tablet TAKE 1 TABLET BY MOUTH  DAILY 90 tablet 2    ezetimibe (ZETIA) 10 mg tablet Take 1 tablet (10 mg total) by mouth daily 30 tablet 5    fluticasone (FLONASE) 50 mcg/act nasal spray 1 spray into each nostril 2 (two) times a day 16 g 2    lisinopril (ZESTRIL) 20 mg tablet       Multiple Vitamins-Minerals (CENTRUM SILVER PO) Take by mouth      nitroglycerin (NITROSTAT) 0 4 mg SL tablet Place 1 tablet under the tongue every 5 (five) minutes as needed      ofloxacin (FLOXIN) 0 3 % otic solution       Omega-3 Fatty Acids (FISH OIL) 1200 MG CAPS Take by mouth      rosuvastatin (CRESTOR) 20 MG tablet TAKE 1 TABLET BY MOUTH  DAILY 90 tablet 3    sildenafil (VIAGRA) 100 mg tablet Take 100 mg by mouth      terazosin (HYTRIN) 1 mg capsule Take 2 mg by mouth daily      warfarin (COUMADIN) 5 mg tablet Take 5 mg by mouth      isosorbide mononitrate (IMDUR) 30 mg 24 hr tablet Take 30 mg by mouth daily       No current facility-administered medications for this visit       Review of Systems   Constitutional: Positive for fatigue  Respiratory: Positive for shortness of breath  All other systems reviewed and are negative          Objective:      /78   Pulse 65   Temp 97 5 °F (36 4 °C)   Ht 5' 10" (1 778 m)   Wt 91 2 kg (201 lb)   SpO2 96%   BMI 28 84 kg/m²          Physical Exam  Constitutional: General: He is not in acute distress  Appearance: He is well-developed  He is not ill-appearing  HENT:      Right Ear: Hearing and external ear normal       Left Ear: Hearing and external ear normal       Nose: Nose normal    Eyes:      Conjunctiva/sclera: Conjunctivae normal       Pupils: Pupils are equal, round, and reactive to light  Neck:      Thyroid: No thyromegaly  Cardiovascular:      Rate and Rhythm: Normal rate and regular rhythm  Heart sounds: Normal heart sounds, S1 normal and S2 normal  No murmur heard  Pulmonary:      Effort: Pulmonary effort is normal       Breath sounds: Normal breath sounds  No wheezing, rhonchi or rales  Abdominal:      General: Bowel sounds are normal       Palpations: Abdomen is soft  Musculoskeletal:         General: Normal range of motion  Right lower leg: No edema  Left lower leg: No edema  Lymphadenopathy:      Cervical: No cervical adenopathy  Skin:     General: Skin is warm and dry  Neurological:      Mental Status: He is alert and oriented to person, place, and time  Mental status is at baseline  Deep Tendon Reflexes: Reflexes are normal and symmetric  Psychiatric:         Behavior: Behavior normal          Thought Content:  Thought content normal          Judgment: Judgment normal

## 2022-01-11 NOTE — ASSESSMENT & PLAN NOTE
Blood pressure assessment today confirms adequate control the patient's blood pressure recommend the continuation of his attic can and diltiazem for blood pressure control along with his lisinopril  Follow-up assessment is requested in 4 months

## 2022-01-12 NOTE — PROGRESS NOTES
Addended by: FRANCINE PAGAN on: 1/12/2022 03:26 PM     Modules accepted: Elvira     Assessment/Plan:    SKYLER (obstructive sleep apnea)    The patient remains compliant with his CPAP treatment for sleep apnea  Fortunately his pump was not 1 of the recalled pumps  Essential hypertension   Blood pressure remains elevated on today's assessment  I recommended that he increase his Hytrin to 2 mg prior to bedtime also continue his lisinopril at 20 mg daily and diltiazem at 120 mg daily    CAD in native artery   Review of cardiac status with the patient today indicates no symptoms of angina or palpitations  Recommend continuation of statin therapy using rosuvastatin and Zetia 10 mg daily  Lipid profile reviewed with the patient  Regular follow-up with Cardiology also advised most recent cardiology note reviewed  Hypercholesterolemia    Cholesterol profile reviewed with the patient recommend continuation of low-cholesterol diet along with continuation of Zetia at 10 mg daily and Crestor at 20 mg daily follow-up testing in 4 months is requested    Anxiety   Increasing stress levels are experience by this gentleman  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  I recommend continuation of Lexapro at 10 mg daily  Abnormal PSA    Increasing PSA is noted on most recent study I have recommended a follow-up diagnostic PSA in 4 months  Patient admits to gradual decrease in urine flow strength    Eustachian tube anomaly    ET tube malfunction on right side likely due to either allergies or CPAP treatment recommend trial of fluticasone nasal spray 1 puff each nostril twice a day       Diagnoses and all orders for this visit:    Abnormal PSA  -     PSA Total, Diagnostic; Future    Hypercholesterolemia  -     Lipid panel; Future    Essential hypertension  -     Comprehensive metabolic panel;  Future    Eustachian tube anomaly  -     fluticasone (FLONASE) 50 mcg/act nasal spray; 1 spray into each nostril 2 (two) times a day    Other orders  -     diltiazem (TIAZAC) 120 MG 24 hr capsule; Take 120 mg by mouth  -     terazosin (HYTRIN) 1 mg capsule; Take 2 mg by mouth daily        Subjective:      Patient ID: Brody Royal  is a 64 y o  male  This 64-year-old gentleman returns today for routine 4 month follow-up visit for assessment of his heart disease hypertension hyperlipidemia  He continues the care for his wife who has advancing dementia  This has increased the patient's stress level over the past several months  I have reviewed the patient's recent visit with his cardiologist at which time he was noted to have elevated blood pressure and his cardiologist placed him on Hytrin 1 mg in the evening  Patient also has persistent right ear pressure with no symptoms of infection at this time  We reviewed on physical examination his ears and nose  Appears that he may have is station tube congestion this sometimes is related to the use of CPAP machines which the patient uses  Recommend that he use fluticasone nasal spray 1 puff each nostril in the morning and again at bedtime  Would avoid any decongestants due to his hypertension  Patient has had no chest pains palpitations or shortness of breath  I have reviewed in detail the patient's most recent visit with his cardiologist outlining his multivessel disease  The following portions of the patient's history were reviewed and updated as appropriate:   He  has a past medical history of Elevated liver enzymes, Hypertension, and TIA (transient ischemic attack)    He   Patient Active Problem List    Diagnosis Date Noted    Eustachian tube anomaly 09/08/2021    Abnormal PSA 09/08/2021    Central apnea     SKYLER (obstructive sleep apnea)     Snoring 12/20/2019    SOB (shortness of breath) 12/10/2019    Anxiety 12/10/2019    Supraventricular tachycardia ([address] ) 12/19/2018    Renal insufficiency 07/31/2018    Essential hypertension 04/11/2018    Abnormal glucose 04/11/2018    Over weight 04/11/2018    Anticoagulant long-term use 10/11/2017    ED (erectile dysfunction) 08/10/2017    Hypercholesterolemia 08/10/2017    Hypertriglyceridemia 09/12/2016    CAD in native artery 05/12/2016    TIA (transient ischemic attack) 02/24/2016    Lumbar canal stenosis 09/13/2013     He  has a past surgical history that includes Cardiac surgery  His family history includes Heart failure in his father and mother; Stroke in his brother  He  reports that he has never smoked  He has never used smokeless tobacco  He reports that he does not drink alcohol and does not use drugs    Current Outpatient Medications   Medication Sig Dispense Refill    albuterol (Ventolin HFA) 90 mcg/act inhaler Inhale 2 puffs every 6 (six) hours as needed for wheezing 18 g 0    aspirin 81 MG tablet Take 81 mg by mouth      benzonatate (TESSALON PERLES) 100 mg capsule Take 1 capsule (100 mg total) by mouth 3 (three) times a day as needed for cough 20 capsule 0    celecoxib (CeleBREX) 200 mg capsule Take 200 mg by mouth daily With a meal  3    diltiazem (DILTIAZEM CD) 120 mg 24 hr capsule Take 120 mg by mouth      diltiazem (TIAZAC) 120 MG 24 hr capsule Take 120 mg by mouth      escitalopram (LEXAPRO) 10 mg tablet TAKE 1 TABLET BY MOUTH  DAILY 90 tablet 2    ezetimibe (ZETIA) 10 mg tablet Take 1 tablet (10 mg total) by mouth daily 30 tablet 5    lisinopril (ZESTRIL) 20 mg tablet       Multiple Vitamins-Minerals (CENTRUM SILVER PO) Take by mouth      nitroglycerin (NITROSTAT) 0 4 mg SL tablet Place 1 tablet under the tongue every 5 (five) minutes as needed      nystatin (MYCOSTATIN) powder Apply topically 2 (two) times a day 30 g 3    Omega-3 Fatty Acids (FISH OIL) 1200 MG CAPS Take by mouth      rosuvastatin (CRESTOR) 20 MG tablet TAKE 1 TABLET BY MOUTH  DAILY 90 tablet 3    sildenafil (VIAGRA) 100 mg tablet Take 100 mg by mouth      terazosin (HYTRIN) 1 mg capsule Take 2 mg by mouth daily      warfarin (COUMADIN) 5 mg tablet Take 5 mg by mouth      fluticasone (FLONASE) 50 mcg/act nasal spray 1 spray into each nostril 2 (two) times a day 16 g 2    isosorbide mononitrate (IMDUR) 30 mg 24 hr tablet Take 30 mg by mouth daily       No current facility-administered medications for this visit       Review of Systems   HENT:        Right ear pressure and congestion   All other systems reviewed and are negative  Objective:      /88   Pulse 72   Temp 97 6 °F (36 4 °C)   Resp 16   Ht 5' 10" (1 778 m)   Wt 91 1 kg (200 lb 12 8 oz)   SpO2 96%   BMI 28 81 kg/m²          Physical Exam  Constitutional:       General: He is not in acute distress  Appearance: He is well-developed  He is not ill-appearing  HENT:      Head: Normocephalic  Right Ear: Hearing, tympanic membrane, ear canal and external ear normal       Left Ear: Hearing, tympanic membrane, ear canal and external ear normal       Nose: Congestion present  Eyes:      General: No scleral icterus  Right eye: No discharge  Left eye: No discharge  Extraocular Movements: Extraocular movements intact  Conjunctiva/sclera: Conjunctivae normal       Pupils: Pupils are equal, round, and reactive to light  Neck:      Thyroid: No thyromegaly  Vascular: No carotid bruit  Cardiovascular:      Rate and Rhythm: Normal rate and regular rhythm  Heart sounds: Normal heart sounds, S1 normal and S2 normal  No murmur heard  Pulmonary:      Effort: Pulmonary effort is normal       Breath sounds: Normal breath sounds  No wheezing, rhonchi or rales  Abdominal:      General: Bowel sounds are normal       Palpations: Abdomen is soft  Tenderness: There is no abdominal tenderness  Musculoskeletal:         General: Normal range of motion  Cervical back: Normal range of motion and neck supple  No rigidity or tenderness  Right lower leg: No edema  Left lower leg: No edema  Lymphadenopathy:      Cervical: No cervical adenopathy     Skin: General: Skin is warm and dry  Neurological:      Mental Status: He is alert and oriented to person, place, and time  Mental status is at baseline  Deep Tendon Reflexes: Reflexes are normal and symmetric  Reflexes normal    Psychiatric:         Behavior: Behavior normal          Thought Content:  Thought content normal          Judgment: Judgment normal

## 2022-01-13 ENCOUNTER — TELEPHONE (OUTPATIENT)
Dept: UROLOGY | Facility: MEDICAL CENTER | Age: 77
End: 2022-01-13

## 2022-01-13 NOTE — TELEPHONE ENCOUNTER
Please Triage - [address] Patient-     What is the reason for the patients appointment? Patient called stating he was referred by family doctor for rising psa levels 09/05/21 2 6, now 01/05/22 is 3 4  He would like to follow up with Dr Kishore Christiansen or Ap is ok as well  Patient will be available Tues, Wed, Thurs, 3 pm   If not early afternoon is ok  Ok to call his work number  Imaging/Lab Results:      Do we accept the patient's insurance or is the patient Self-Pay? Provider & Plan:   Member ID#: Medicare / hop      Has the patient had any previous urologist(s)?yes   Along time ago          Have patient records been requested?in epic        Has the patient had any outside testing done?in epic       Does the patient have a personal history of cancer?no       Patient can be reached at :[phone] ()

## 2022-01-18 ENCOUNTER — OFFICE VISIT (OUTPATIENT)
Dept: PULMONOLOGY | Facility: CLINIC | Age: 77
End: 2022-01-18
Payer: MEDICARE

## 2022-01-18 VITALS
DIASTOLIC BLOOD PRESSURE: 84 MMHG | BODY MASS INDEX: 28.77 KG/M2 | OXYGEN SATURATION: 95 % | SYSTOLIC BLOOD PRESSURE: 130 MMHG | TEMPERATURE: 97.1 F | HEIGHT: 70 IN | HEART RATE: 72 BPM | WEIGHT: 201 LBS | RESPIRATION RATE: 16 BRPM

## 2022-01-18 DIAGNOSIS — R06.02 SOB (SHORTNESS OF BREATH): ICD-10-CM

## 2022-01-18 DIAGNOSIS — G47.33 OSA (OBSTRUCTIVE SLEEP APNEA): Primary | ICD-10-CM

## 2022-01-18 PROCEDURE — 99214 OFFICE O/P EST MOD 30 MIN: CPT | Performed by: INTERNAL MEDICINE

## 2022-01-18 NOTE — ASSESSMENT & PLAN NOTE
He is noticing more of this with time  While he admits it likely has to do with weight gain, he has had progressive declines in his diffusion capacity over the years  This was without imaging correlate on HRCT  The remainder of his PFTs were fine and isolated diffusion defects are typically due to pulmonary vascular disease  I left the door open to repeat imaging or perform PFTs, but I also have very little suspicion for any diagnoses that could be made from either

## 2022-01-18 NOTE — PROGRESS NOTES
Pulmonary Follow Up Note   Valentina Olivo  64 y o  male MRN: [id number]  1/18/2022    Assessment:    SKYLER (obstructive sleep apnea)  Compliance report reviewed  He has a residual AHI of 4 7 on treatment - within goals, but higher than usual and he is having more symptoms  He has periorbital swelling that lasts for several hours after using his device  · Start with pressure change, 8-18, at patient's request  · Suggest full face mask with no nasal bridge covering; may be pressure injury or tear duct obstruction  · Refer to ENT for consideration of Inspire device usage if neither of the above are effective    SOB (shortness of breath)  He is noticing more of this with time  While he admits it likely has to do with weight gain, he has had progressive declines in his diffusion capacity over the years  This was without imaging correlate on HRCT  The remainder of his PFTs were fine and isolated diffusion defects are typically due to pulmonary vascular disease  I left the door open to repeat imaging or perform PFTs, but I also have very little suspicion for any diagnoses that could be made from either  Plan:    Diagnoses and all orders for this visit:    SKYLER (obstructive sleep apnea)  -     PAP DME Pressure Change    SOB (shortness of breath)      Return in about 1 year (around 1/18/2023)  History of Present Illness   HPI:  Valentina Olivo  is a 64 y o  male who presents for follow up and CPAP compliance review  He is noticing progressive dyspnea over the past year or so, having difficulty bending over for prolonged periods or going up flights of stairs  He reports he has put on some weight and is suspicious that this is responsible for his dyspnea, but he notices worsening symptoms on a treadmill or elliptical as well and is concerned about how dyspneic he is      Regarding CPAP, he feels like he no longer has a good fit, showing pictures of periorbital/suborbital edema which takes several hours to go away after using his device  He tried loosening the mask but had a large air leak; he tightened in small increments to the lowest necessary pressure but still has symptoms  Answers for HPI/ROS submitted by the patient on 1/17/2022  Chronicity: recurrent  When did you first notice your symptoms?: more than 1 year ago  How often do your symptoms occur?: daily  Since you first noticed this problem, how has it changed?: unchanged  Do you have shortness of breath that occurs with effort or exertion?: Yes  Do you have ear congestion?: No  Do you have heartburn?: No  Do you have fatigue?: No  Do you have nasal congestion?: Yes  Do you have shortness of breath when lying flat?: No  Do you have shortness of breath when you wake up?: No  Do you have sweats?: No  Have you experienced weight loss?: No  Which of the following makes your symptoms worse?: any activity, climbing stairs, exercise, minimal activity, strenuous activity  Which of the following makes your symptoms better?: nothing    Review of Systems   Constitutional: Negative for appetite change and fever  HENT: Negative for ear pain, postnasal drip, rhinorrhea, sneezing, sore throat and trouble swallowing  Respiratory: Positive for shortness of breath  Cardiovascular: Negative for chest pain  Musculoskeletal: Negative for myalgias  Neurological: Negative for headaches  All other systems reviewed and are negative        Historical Information   Past Medical History:   Diagnosis Date    Elevated liver enzymes     Hypertension     TIA (transient ischemic attack)      Past Surgical History:   Procedure Laterality Date    CARDIAC SURGERY       Family History   Problem Relation Age of Onset    Heart failure Mother     Heart failure Father     Stroke Brother        Meds/Allergies     Current Outpatient Medications:     aspirin 81 MG tablet, Take 81 mg by mouth, Disp: , Rfl:     diltiazem (DILTIAZEM CD) 120 mg 24 hr capsule, Take 120 mg by mouth, Disp: , Rfl:     diltiazem (TIAZAC) 120 MG 24 hr capsule, Take 120 mg by mouth, Disp: , Rfl:     escitalopram (LEXAPRO) 10 mg tablet, TAKE 1 TABLET BY MOUTH  DAILY, Disp: 90 tablet, Rfl: 2    ezetimibe (ZETIA) 10 mg tablet, Take 1 tablet (10 mg total) by mouth daily, Disp: 30 tablet, Rfl: 5    Multiple Vitamins-Minerals (CENTRUM SILVER PO), Take by mouth, Disp: , Rfl:     nitroglycerin (NITROSTAT) 0 4 mg SL tablet, Place 1 tablet under the tongue every 5 (five) minutes as needed, Disp: , Rfl:     Omega-3 Fatty Acids (FISH OIL) 1200 MG CAPS, Take by mouth, Disp: , Rfl:     rosuvastatin (CRESTOR) 20 MG tablet, TAKE 1 TABLET BY MOUTH  DAILY, Disp: 90 tablet, Rfl: 3    terazosin (HYTRIN) 1 mg capsule, Take 2 mg by mouth daily, Disp: , Rfl:     warfarin (COUMADIN) 5 mg tablet, Take 5 mg by mouth, Disp: , Rfl:     isosorbide mononitrate (IMDUR) 30 mg 24 hr tablet, Take 30 mg by mouth daily, Disp: , Rfl:   No Known Allergies    Vitals: Blood pressure 130/84, pulse 72, temperature (!) 97 1 °F (36 2 °C), temperature source Tympanic, resp  rate 16, height 5' 10" (1 778 m), weight 91 2 kg (201 lb), SpO2 95 %  Body mass index is 28 84 kg/m²  Oxygen Therapy  SpO2: 95 %  Oxygen Therapy: None (Room air)    Physical Exam  Physical Exam  Vitals reviewed  Constitutional:       General: He is not in acute distress  Appearance: Normal appearance  He is well-developed  He is not ill-appearing  HENT:      Head: Normocephalic and atraumatic  Eyes:      General: No scleral icterus  Conjunctiva/sclera: Conjunctivae normal    Neck:      Thyroid: No thyromegaly  Vascular: No JVD  Cardiovascular:      Rate and Rhythm: Normal rate and regular rhythm  Heart sounds: Normal heart sounds  No murmur heard  No friction rub  No gallop  Pulmonary:      Effort: Pulmonary effort is normal  No respiratory distress  Breath sounds: Normal breath sounds  No wheezing or rales     Musculoskeletal:      Cervical back: Neck supple  Right lower leg: No edema  Left lower leg: No edema  Skin:     General: Skin is warm and dry  Findings: No rash  Neurological:      General: No focal deficit present  Mental Status: He is alert and oriented to person, place, and time  Mental status is at baseline  Psychiatric:         Mood and Affect: Mood normal          Behavior: Behavior normal      Labs: I have personally reviewed pertinent lab results  Lab Results   Component Value Date    WBC 7 21 04/13/2021    HGB 15 9 04/13/2021    HCT 46 8 04/13/2021    MCV 97 04/13/2021     04/13/2021     Lab Results   Component Value Date    GLUCOSE 99 12/18/2019    CALCIUM 9 6 01/05/2022     (H) 09/26/2015    K 4 3 01/05/2022    CO2 29 01/05/2022     (H) 01/05/2022    BUN 11 01/05/2022    CREATININE 1 10 01/05/2022     No results found for: IGE  Lab Results   Component Value Date    ALT 31 01/05/2022    AST 28 01/05/2022    ALKPHOS 90 01/05/2022    BILITOT 0 53 08/24/2014       Imaging and other studies: I have personally reviewed pertinent films in PACS    EKG, Pathology, and Other Studies: I have personally reviewed pertinent reports  [facility]

## 2022-01-18 NOTE — ASSESSMENT & PLAN NOTE
Compliance report reviewed  He has a residual AHI of 4 7 on treatment - within goals, but higher than usual and he is having more symptoms  He has periorbital swelling that lasts for several hours after using his device      · Start with pressure change, 8-18, at patient's request  · Suggest full face mask with no nasal bridge covering; may be pressure injury or tear duct obstruction  · Refer to ENT for consideration of Inspire device usage if neither of the above are effective Introduction Text (Please End With A Colon): The following procedure was deferred: Shave excision Detail Level: Zone

## 2022-01-19 ENCOUNTER — CONSULT (OUTPATIENT)
Dept: UROLOGY | Facility: CLINIC | Age: 77
End: 2022-01-19
Payer: MEDICARE

## 2022-01-19 ENCOUNTER — DOCUMENTATION (OUTPATIENT)
Dept: PULMONOLOGY | Facility: CLINIC | Age: 77
End: 2022-01-19

## 2022-01-19 VITALS
BODY MASS INDEX: 28.35 KG/M2 | HEIGHT: 70 IN | WEIGHT: 198 LBS | SYSTOLIC BLOOD PRESSURE: 154 MMHG | HEART RATE: 82 BPM | DIASTOLIC BLOOD PRESSURE: 82 MMHG

## 2022-01-19 DIAGNOSIS — R97.20 ELEVATED PSA: Primary | ICD-10-CM

## 2022-01-19 PROCEDURE — 99203 OFFICE O/P NEW LOW 30 MIN: CPT | Performed by: PHYSICIAN ASSISTANT

## 2022-01-19 NOTE — PROGRESS NOTES
UROLOGY CONSULTATION NOTE     Patient Identifiers: Tyler Marquez (MRN: [id number])  Service Requesting Consultation: Jose Limon MD  Service Providing Consultation:  Urology, Keith Spivey PA-C  Consults  Date of Service: 1/19/2022    Reason for Consultation: Rising PSA    History of Present Illness:     Tyler Maruqez is a 64 y o  Male known to me  He had a TURP by Dr Daniella Olivares in 2006  The pathology was benign  He was referred from primary care for concern of rising PSA  His current PSA is 3 4  Previously was 2 6 and 1 4 prior to that  Of note he had a PSA of 3 1 in 2003  He has occasional slow flow and occasional UTI  No burning and no hematuria  There is no recent imaging  He denies any family history of prostate cancer      Past Medical, Past Surgical History:     Past Medical History:   Diagnosis Date    Elevated liver enzymes     Hypertension     TIA (transient ischemic attack)    :    Past Surgical History:   Procedure Laterality Date    CARDIAC SURGERY     :    Medications, Allergies:     Current Outpatient Medications:     aspirin 81 MG tablet, Take 81 mg by mouth, Disp: , Rfl:     diltiazem (DILTIAZEM CD) 120 mg 24 hr capsule, Take 120 mg by mouth, Disp: , Rfl:     escitalopram (LEXAPRO) 10 mg tablet, TAKE 1 TABLET BY MOUTH  DAILY, Disp: 90 tablet, Rfl: 2    ezetimibe (ZETIA) 10 mg tablet, Take 1 tablet (10 mg total) by mouth daily, Disp: 30 tablet, Rfl: 5    Multiple Vitamins-Minerals (CENTRUM SILVER PO), Take by mouth, Disp: , Rfl:     nitroglycerin (NITROSTAT) 0 4 mg SL tablet, Place 1 tablet under the tongue every 5 (five) minutes as needed, Disp: , Rfl:     Omega-3 Fatty Acids (FISH OIL) 1200 MG CAPS, Take by mouth, Disp: , Rfl:     rosuvastatin (CRESTOR) 20 MG tablet, TAKE 1 TABLET BY MOUTH  DAILY, Disp: 90 tablet, Rfl: 3    terazosin (HYTRIN) 1 mg capsule, Take 2 mg by mouth daily, Disp: , Rfl:     warfarin (COUMADIN) 5 mg tablet, Take 5 mg by mouth, Disp: , Rfl:     diltiazem (TIAZAC) 120 MG 24 hr capsule, Take 120 mg by mouth (Patient not taking: Reported on 1/19/2022 ), Disp: , Rfl:     isosorbide mononitrate (IMDUR) 30 mg 24 hr tablet, Take 30 mg by mouth daily, Disp: , Rfl:     Allergies:  No Known Allergies:    Social and Family History:   Social History:   Social History     Tobacco Use    Smoking status: Never Smoker    Smokeless tobacco: Never Used   Vaping Use    Vaping Use: Never used   Substance Use Topics    Alcohol use: Never    Drug use: Never     Social History     Tobacco Use   Smoking Status Never Smoker   Smokeless Tobacco Never Used       Family History:  Family History   Problem Relation Age of Onset    Heart failure Mother     Heart failure Father     Stroke Brother    :     Review of Systems:     General: Fever, chills, or night sweats: negative  Cardiac: Negative for chest pain  Pulmonary: Negative for shortness of breath  Gastrointestinal: Abdominal pain negative  Nausea, vomiting, or diarrhea negative,  Genitourinary: See HPI above  Patient does not have hematuria  All other systems queried were negative  Physical Exam:   General: Patient is pleasant and in NAD  Awake and alert  /82   Pulse 82   Ht 5' 10" (1 778 m)   Wt 89 8 kg (198 lb)   BMI 28 41 kg/m²   HEENT: conjunctiva are clear  Constitutional:  pleasant and cooperative     no apparent distress  Cardiac: Peripheral edema: negative  Pulmonary: Non-labored breathing  Abdomen: Soft, non-tender, non-distended  No surgical scars  No masses, tenderness, hernias noted  Genitourinary: Negative CVA tenderness, negative suprapubic tenderness  Extremities: moves all extremities  Neurological:CNII-XII intact  No numbness or tingling  Essentially non focal neurologic exam  Psychiatric:mood affect and behavior normal    (Male): Penis circumcised, phallus normal, meatus patent  Testicles descended into scrotum bilaterally without masses nor tenderness    No inguinal hernias bilaterally  VINOD: Prostate is enlarged at 35 grams  The prostate is not boggy  The prostate is not tender  No nodules noted  Labs:     Lab Results   Component Value Date    HGB 15 9 04/13/2021    HCT 46 8 04/13/2021    WBC 7 21 04/13/2021     04/13/2021   ]    Lab Results   Component Value Date     (H) 09/26/2015    K 4 3 01/05/2022     (H) 01/05/2022    CO2 29 01/05/2022    BUN 11 01/05/2022    CREATININE 1 10 01/05/2022    CALCIUM 9 6 01/05/2022    GLUCOSE 99 12/18/2019   ]    Imaging:   I personally reviewed the images and report of the following studies, and reviewed them with the patient:    None      ASSESSMENT:      1  Elevated PSA     2  BPH with prior TURP    PLAN:   - he has a persistently upward trend of his PSA  - there are no abnormalities on his prostate exam  - I expect that this change in his PSA is related to regrowth of his prostate since his TURP was over 15 years ago  - suggest follow-up in 6 months with PSA prior to visit  - may consider multiparametric MRI if the PSA continues to rise    Thank you for allowing me to participate in this patients care  Please do not hesitate to call with any additional questions    Amparo Kendall PA-C

## 2022-02-28 ENCOUNTER — TELEPHONE (OUTPATIENT)
Dept: PULMONOLOGY | Facility: CLINIC | Age: 77
End: 2022-02-28

## 2022-02-28 DIAGNOSIS — G47.33 OSA (OBSTRUCTIVE SLEEP APNEA): Primary | ICD-10-CM

## 2022-02-28 NOTE — TELEPHONE ENCOUNTER
Patient is calling, he said he had a discussion with Brionna Charles about his Cpap face mask and to call the office back if he made a decision  The patient would like us to put an order into [facility] for him to get a new mask  He currently has a full face mask but is asking to get the nose only one  He would appreciate if we can get this sent in for him   Please advise

## 2022-03-03 NOTE — TELEPHONE ENCOUNTER
Pt called and left a VM stating that [address] [place] [address] did not receive order for a CPAP mask   Please call pt with an update

## 2022-03-07 NOTE — TELEPHONE ENCOUNTER
I called [facility] they have an appointment scheduled for the patient and it is documented in parachute for an April appt for the mask fitting  They are going to call the patient and remind him of the mask fitting appointment

## 2022-04-16 LAB
DME PARACHUTE DELIVERY DATE ACTUAL: NORMAL
DME PARACHUTE DELIVERY DATE REQUESTED: NORMAL
DME PARACHUTE ITEM DESCRIPTION: NORMAL
DME PARACHUTE ORDER STATUS: NORMAL
DME PARACHUTE SUPPLIER NAME: NORMAL
DME PARACHUTE SUPPLIER PHONE: NORMAL

## 2022-04-30 ENCOUNTER — APPOINTMENT (OUTPATIENT)
Dept: LAB | Age: 77
End: 2022-04-30
Payer: MEDICARE

## 2022-04-30 DIAGNOSIS — I10 ESSENTIAL HYPERTENSION: ICD-10-CM

## 2022-04-30 DIAGNOSIS — I25.719 ATHEROSCLEROSIS OF AUTOLOGOUS VEIN CORONARY ARTERY BYPASS GRAFT WITH ANGINA PECTORIS (HCC): ICD-10-CM

## 2022-04-30 DIAGNOSIS — E78.5 HYPERLIPIDEMIA, UNSPECIFIED HYPERLIPIDEMIA TYPE: ICD-10-CM

## 2022-04-30 DIAGNOSIS — M79.10 MYALGIA: ICD-10-CM

## 2022-04-30 DIAGNOSIS — E78.1 HYPERTRIGLYCERIDEMIA: ICD-10-CM

## 2022-04-30 DIAGNOSIS — R97.20 ELEVATED PSA: ICD-10-CM

## 2022-04-30 LAB
ALBUMIN SERPL BCP-MCNC: 3.6 G/DL (ref 3.5–5)
ALP SERPL-CCNC: 81 U/L (ref 46–116)
ALT SERPL W P-5'-P-CCNC: 35 U/L (ref 12–78)
ANION GAP SERPL CALCULATED.3IONS-SCNC: 6 MMOL/L (ref 4–13)
AST SERPL W P-5'-P-CCNC: 31 U/L (ref 5–45)
BASOPHILS # BLD AUTO: 0.03 THOUSANDS/ΜL (ref 0–0.1)
BASOPHILS NFR BLD AUTO: 1 % (ref 0–1)
BILIRUB SERPL-MCNC: 0.96 MG/DL (ref 0.2–1)
BUN SERPL-MCNC: 15 MG/DL (ref 5–25)
CALCIUM SERPL-MCNC: 9.6 MG/DL (ref 8.3–10.1)
CHLORIDE SERPL-SCNC: 109 MMOL/L (ref 100–108)
CHOLEST SERPL-MCNC: 117 MG/DL
CK MB SERPL-MCNC: 1.4 % (ref 0–2.5)
CK MB SERPL-MCNC: 5.8 NG/ML (ref 0–5)
CK SERPL-CCNC: 409 U/L (ref 39–308)
CO2 SERPL-SCNC: 27 MMOL/L (ref 21–32)
CREAT SERPL-MCNC: 1.14 MG/DL (ref 0.6–1.3)
EOSINOPHIL # BLD AUTO: 0.11 THOUSAND/ΜL (ref 0–0.61)
EOSINOPHIL NFR BLD AUTO: 2 % (ref 0–6)
ERYTHROCYTE [DISTWIDTH] IN BLOOD BY AUTOMATED COUNT: 12.4 % (ref 11.6–15.1)
ERYTHROCYTE [SEDIMENTATION RATE] IN BLOOD: 12 MM/HOUR (ref 0–19)
GFR SERPL CREATININE-BSD FRML MDRD: 61 ML/MIN/1.73SQ M
GLUCOSE P FAST SERPL-MCNC: 109 MG/DL (ref 65–99)
HCT VFR BLD AUTO: 45.8 % (ref 36.5–49.3)
HDLC SERPL-MCNC: 39 MG/DL
HGB BLD-MCNC: 15.6 G/DL (ref 12–17)
IMM GRANULOCYTES # BLD AUTO: 0.01 THOUSAND/UL (ref 0–0.2)
IMM GRANULOCYTES NFR BLD AUTO: 0 % (ref 0–2)
LDLC SERPL CALC-MCNC: 56 MG/DL (ref 0–100)
LDLC SERPL DIRECT ASSAY-MCNC: 67 MG/DL (ref 0–100)
LYMPHOCYTES # BLD AUTO: 1.53 THOUSANDS/ΜL (ref 0.6–4.47)
LYMPHOCYTES NFR BLD AUTO: 26 % (ref 14–44)
MCH RBC QN AUTO: 33 PG (ref 26.8–34.3)
MCHC RBC AUTO-ENTMCNC: 34.1 G/DL (ref 31.4–37.4)
MCV RBC AUTO: 97 FL (ref 82–98)
MONOCYTES # BLD AUTO: 0.49 THOUSAND/ΜL (ref 0.17–1.22)
MONOCYTES NFR BLD AUTO: 8 % (ref 4–12)
NEUTROPHILS # BLD AUTO: 3.63 THOUSANDS/ΜL (ref 1.85–7.62)
NEUTS SEG NFR BLD AUTO: 63 % (ref 43–75)
NONHDLC SERPL-MCNC: 78 MG/DL
NRBC BLD AUTO-RTO: 0 /100 WBCS
PLATELET # BLD AUTO: 224 THOUSANDS/UL (ref 149–390)
PMV BLD AUTO: 10.1 FL (ref 8.9–12.7)
POTASSIUM SERPL-SCNC: 3.9 MMOL/L (ref 3.5–5.3)
PROT SERPL-MCNC: 6.8 G/DL (ref 6.4–8.2)
PSA SERPL-MCNC: 5 NG/ML (ref 0–4)
RBC # BLD AUTO: 4.73 MILLION/UL (ref 3.88–5.62)
SODIUM SERPL-SCNC: 142 MMOL/L (ref 136–145)
TRIGL SERPL-MCNC: 108 MG/DL
WBC # BLD AUTO: 5.8 THOUSAND/UL (ref 4.31–10.16)

## 2022-04-30 PROCEDURE — 82550 ASSAY OF CK (CPK): CPT

## 2022-04-30 PROCEDURE — 85025 COMPLETE CBC W/AUTO DIFF WBC: CPT

## 2022-04-30 PROCEDURE — 82553 CREATINE MB FRACTION: CPT

## 2022-04-30 PROCEDURE — 84153 ASSAY OF PSA TOTAL: CPT

## 2022-04-30 PROCEDURE — 36415 COLL VENOUS BLD VENIPUNCTURE: CPT

## 2022-04-30 PROCEDURE — 85652 RBC SED RATE AUTOMATED: CPT

## 2022-04-30 PROCEDURE — 80061 LIPID PANEL: CPT

## 2022-04-30 PROCEDURE — 80053 COMPREHEN METABOLIC PANEL: CPT

## 2022-04-30 PROCEDURE — 83721 ASSAY OF BLOOD LIPOPROTEIN: CPT

## 2022-05-17 ENCOUNTER — OFFICE VISIT (OUTPATIENT)
Dept: INTERNAL MEDICINE CLINIC | Facility: CLINIC | Age: 77
End: 2022-05-17
Payer: MEDICARE

## 2022-05-17 VITALS
HEIGHT: 70 IN | DIASTOLIC BLOOD PRESSURE: 78 MMHG | BODY MASS INDEX: 29.06 KG/M2 | WEIGHT: 203 LBS | TEMPERATURE: 96.9 F | SYSTOLIC BLOOD PRESSURE: 130 MMHG | OXYGEN SATURATION: 94 % | HEART RATE: 74 BPM

## 2022-05-17 DIAGNOSIS — R73.09 ABNORMAL GLUCOSE: ICD-10-CM

## 2022-05-17 DIAGNOSIS — I25.10 CAD IN NATIVE ARTERY: ICD-10-CM

## 2022-05-17 DIAGNOSIS — M79.10 MYALGIA: ICD-10-CM

## 2022-05-17 DIAGNOSIS — E78.1 HYPERTRIGLYCERIDEMIA: ICD-10-CM

## 2022-05-17 DIAGNOSIS — E78.00 HYPERCHOLESTEROLEMIA: ICD-10-CM

## 2022-05-17 DIAGNOSIS — G47.33 OSA (OBSTRUCTIVE SLEEP APNEA): ICD-10-CM

## 2022-05-17 DIAGNOSIS — Z00.00 HEALTHCARE MAINTENANCE: ICD-10-CM

## 2022-05-17 DIAGNOSIS — I10 ESSENTIAL HYPERTENSION: Primary | ICD-10-CM

## 2022-05-17 DIAGNOSIS — R97.20 ABNORMAL PSA: ICD-10-CM

## 2022-05-17 PROBLEM — Q17.8 EUSTACHIAN TUBE ANOMALY: Status: RESOLVED | Noted: 2021-09-08 | Resolved: 2022-05-17

## 2022-05-17 PROCEDURE — G0439 PPPS, SUBSEQ VISIT: HCPCS | Performed by: INTERNAL MEDICINE

## 2022-05-17 PROCEDURE — 99215 OFFICE O/P EST HI 40 MIN: CPT | Performed by: INTERNAL MEDICINE

## 2022-05-17 RX ORDER — TORSEMIDE 10 MG/1
TABLET ORAL
COMMUNITY
Start: 2022-03-22

## 2022-05-17 RX ORDER — TERAZOSIN 5 MG/1
CAPSULE ORAL
COMMUNITY
Start: 2022-04-10 | End: 2022-05-17 | Stop reason: SDUPTHER

## 2022-05-17 NOTE — ASSESSMENT & PLAN NOTE
Upward trending PSA along with lower urinary tract symptoms reviewed with the patient    Recommend follow-up with his urologist   PSA is now up to 5 0

## 2022-05-17 NOTE — ASSESSMENT & PLAN NOTE
Mild upward trend in the patient's fasting blood sugar on his most recent comprehensive metabolic profile    He is in a impaired glucose her abnormal glucose category I reviewed dietary changes to address this elevation and we will recheck it in 4 months

## 2022-05-17 NOTE — PROGRESS NOTES
Assessment and Plan:     Problem List Items Addressed This Visit    None       BMI Counseling: Body mass index is 29 13 kg/m²  The BMI is above normal  Nutrition recommendations include decreasing portion sizes, consuming healthier snacks, moderation in carbohydrate intake and reducing intake of cholesterol  Exercise recommendations include exercising 3-5 times per week  No pharmacotherapy was ordered  Rationale for BMI follow-up plan is due to patient being overweight or obese  Depression Screening and Follow-up Plan: Patient was screened for depression during today's encounter  They screened negative with a PHQ-2 score of 0  Preventive health issues were discussed with patient, and age appropriate screening tests were ordered as noted in patient's After Visit Summary  Personalized health advice and appropriate referrals for health education or preventive services given if needed, as noted in patient's After Visit Summary       History of Present Illness:     Patient presents for Medicare Annual Wellness visit    Patient Care Team:  Gene Dawson MD as PCP - General     Problem List:     Patient Active Problem List   Diagnosis    Essential hypertension    Abnormal glucose    Anticoagulant long-term use    CAD in native artery    ED (erectile dysfunction)    Hypercholesterolemia    Hypertriglyceridemia    Lumbar canal stenosis    TIA (transient ischemic attack)    Over weight    Renal insufficiency    Supraventricular tachycardia (HCC)    SOB (shortness of breath)    Anxiety    Snoring    SKYLER (obstructive sleep apnea)    Central apnea    Eustachian tube anomaly    Abnormal PSA      Past Medical and Surgical History:     Past Medical History:   Diagnosis Date    Elevated liver enzymes     Hypertension     TIA (transient ischemic attack)      Past Surgical History:   Procedure Laterality Date    CARDIAC SURGERY        Family History:     Family History   Problem Relation Age of Onset    Heart failure Mother     Heart failure Father     Stroke Brother       Social History:     Social History     Socioeconomic History    Marital status: /Civil Union     Spouse name: Not on file    Number of children: Not on file    Years of education: Not on file    Highest education level: Not on file   Occupational History    Not on file   Tobacco Use    Smoking status: Never Smoker    Smokeless tobacco: Never Used   Vaping Use    Vaping Use: Never used   Substance and Sexual Activity    Alcohol use: Never    Drug use: Never    Sexual activity: Not Currently   Other Topics Concern    Not on file   Social History Narrative    Not on file     Social Determinants of Health     Financial Resource Strain: Not on file   Food Insecurity: Not on file   Transportation Needs: Not on file   Physical Activity: Not on file   Stress: Not on file   Social Connections: Not on file   Intimate Partner Violence: Not on file   Housing Stability: Not on file      Medications and Allergies:     Current Outpatient Medications   Medication Sig Dispense Refill    torsemide (DEMADEX) 10 mg tablet TAKE ONE A DAY AS NEEDED FOR EDEMA      aspirin 81 MG tablet Take 81 mg by mouth      diltiazem (DILTIAZEM CD) 120 mg 24 hr capsule Take 120 mg by mouth      escitalopram (LEXAPRO) 10 mg tablet TAKE 1 TABLET BY MOUTH  DAILY 90 tablet 2    ezetimibe (ZETIA) 10 mg tablet Take 1 tablet (10 mg total) by mouth daily 30 tablet 5    isosorbide mononitrate (IMDUR) 30 mg 24 hr tablet Take 30 mg by mouth daily      Multiple Vitamins-Minerals (CENTRUM SILVER PO) Take by mouth      nitroglycerin (NITROSTAT) 0 4 mg SL tablet Place 1 tablet under the tongue every 5 (five) minutes as needed      Omega-3 Fatty Acids (FISH OIL) 1200 MG CAPS Take by mouth      rosuvastatin (CRESTOR) 20 MG tablet TAKE 1 TABLET BY MOUTH  DAILY 90 tablet 3    terazosin (HYTRIN) 1 mg capsule Take 2 mg by mouth daily      terazosin (HYTRIN) 5 mg capsule       warfarin (COUMADIN) 5 mg tablet Take 5 mg by mouth       No current facility-administered medications for this visit  No Known Allergies   Immunizations:     Immunization History   Administered Date(s) Administered    COVID-19 MODERNA VACC 0 25 ML IM BOOSTER 10/28/2021    COVID-19 MODERNA VACC 0 5 ML IM 01/23/2021, 02/19/2021    INFLUENZA 11/07/2007, 11/01/2018, 11/09/2019, 11/08/2020, 11/05/2021, 11/07/2021    Influenza, seasonal, injectable 11/07/2007    Pneumococcal Conjugate 13-Valent 12/22/2016    Pneumococcal Polysaccharide PPV23 08/16/2018    Zoster 12/18/2013    Zoster Vaccine Recombinant 10/16/2020, 12/18/2020      Health Maintenance:         Topic Date Due    Hepatitis C Screening  Completed         Topic Date Due    DTaP,Tdap,and Td Vaccines (1 - Tdap) Never done    COVID-19 Vaccine (4 - Booster for Moderna series) 02/28/2022      Medicare Health Risk Assessment:     There were no vitals taken for this visit  Judie Jacobson is here for his Subsequent Wellness visit  Health Risk Assessment:   Patient rates overall health as good  Patient feels that their physical health rating is same  Patient is satisfied with their life  Eyesight was rated as same  Hearing was rated as same  Patient feels that their emotional and mental health rating is same  Patients states they are sometimes angry  Patient states they are often unusually tired/fatigued  Pain experienced in the last 7 days has been some  Patient's pain rating has been 5/10  Patient states that he has experienced no weight loss or gain in last 6 months  Depression Screening:   PHQ-2 Score: 0      Fall Risk Screening: In the past year, patient has experienced: no history of falling in past year      Home Safety:  Patient does not have trouble with stairs inside or outside of their home  Patient has working smoke alarms and has no working carbon monoxide detector  Home safety hazards include: none       Nutrition: Current diet is Regular  Medications:   Patient is currently taking over-the-counter supplements  OTC medications include: see medication list  Patient is able to manage medications  Activities of Daily Living (ADLs)/Instrumental Activities of Daily Living (IADLs):   Walk and transfer into and out of bed and chair?: Yes  Dress and groom yourself?: Yes    Bathe or shower yourself?: Yes    Feed yourself? Yes  Do your laundry/housekeeping?: No  Manage your money, pay your bills and track your expenses?: Yes  Make your own meals?: Yes    Do your own shopping?: Yes    Previous Hospitalizations:   Any hospitalizations or ED visits within the last 12 months?: No      Advance Care Planning:   Living will: Yes    Durable POA for healthcare:  Yes    Advanced directive: Yes      PREVENTIVE SCREENINGS      Cardiovascular Screening:    General: Screening Not Indicated and History Lipid Disorder      Diabetes Screening:     General: Screening Current      Colorectal Cancer Screening:     General: Risks and Benefits Discussed      Prostate Cancer Screening:    General: Screening Not Indicated      Abdominal Aortic Aneurysm (AAA) Screening:        General: Screening Not Indicated      Lung Cancer Screening:     General: Screening Not Indicated      Hepatitis C Screening:    General: Screening Current    Screening, Brief Intervention, and Referral to Treatment (SBIRT)    Screening    Typical number of drinks in a week: 0    Single Item Drug Screening:  How often have you used an illegal drug (including marijuana) or a prescription medication for non-medical reasons in the past year? never    Single Item Drug Screen Score: 0  Interpretation: Negative screen for possible drug use disorder      Shine Emanuel MD

## 2022-05-17 NOTE — ASSESSMENT & PLAN NOTE
He has a new nasal appliance for his CPAP machine finds it much more comfortable than the previous 1 and is compliant again with daily use of his CPAP

## 2022-05-17 NOTE — ASSESSMENT & PLAN NOTE
Myalgias symptoms quite possibly related to rosuvastatin medication reviewed this with the patient CPK is elevated and on the advice of his cardiologist he has already held his rosuvastatin    Plan is to repeat lipid profile in 4 months continue Zetia 10 mg daily for now

## 2022-05-17 NOTE — ASSESSMENT & PLAN NOTE
Triglyceride values reviewed with the patient continue Zetia and healthy balance diet next testing in 4 months

## 2022-05-17 NOTE — ASSESSMENT & PLAN NOTE
Patient denies any cardiac symptoms of chest pain palpitations or peripheral edema    Continue current cardiac medications and regular follow-up visits with the patient's cardiologist most recent visit with Cardiology has been reviewed in preparation for today's visit

## 2022-05-17 NOTE — ASSESSMENT & PLAN NOTE
Assessment of hypertension today confirms adequate control of hypertension recommend continuation of current therapy

## 2022-05-17 NOTE — ASSESSMENT & PLAN NOTE
Kidney performance remains stable but slightly below normal values recommend avoidance of dehydration and avoidance of nonsteroidal anti-inflammatories continue to monitor

## 2022-05-17 NOTE — ASSESSMENT & PLAN NOTE
Lipid profile reviewed with the patient currently it is in excellent range he is not on his rosuvastatin any longer due to it being held because a muscular pain    He continues on Zetia 10 mg daily a follow-up assessment of cholesterol is plan for 4 months

## 2022-05-17 NOTE — PATIENT INSTRUCTIONS

## 2022-05-17 NOTE — PROGRESS NOTES
Assessment/Plan:    SKYLER (obstructive sleep apnea)  He has a new nasal appliance for his CPAP machine finds it much more comfortable than the previous 1 and is compliant again with daily use of his CPAP  Essential hypertension  Assessment of hypertension today confirms adequate control of hypertension recommend continuation of current therapy    CAD in native artery  Patient denies any cardiac symptoms of chest pain palpitations or peripheral edema  Continue current cardiac medications and regular follow-up visits with the patient's cardiologist most recent visit with Cardiology has been reviewed in preparation for today's visit    Renal insufficiency  Kidney performance remains stable but slightly below normal values recommend avoidance of dehydration and avoidance of nonsteroidal anti-inflammatories continue to monitor    Abnormal glucose  Mild upward trend in the patient's fasting blood sugar on his most recent comprehensive metabolic profile  He is in a impaired glucose her abnormal glucose category I reviewed dietary changes to address this elevation and we will recheck it in 4 months    Hypercholesterolemia  Lipid profile reviewed with the patient currently it is in excellent range he is not on his rosuvastatin any longer due to it being held because a muscular pain  He continues on Zetia 10 mg daily a follow-up assessment of cholesterol is plan for 4 months    Hypertriglyceridemia  Triglyceride values reviewed with the patient continue Zetia and healthy balance diet next testing in 4 months    Abnormal PSA  Upward trending PSA along with lower urinary tract symptoms reviewed with the patient  Recommend follow-up with his urologist   PSA is now up to 5 0    Myalgia  Myalgias symptoms quite possibly related to rosuvastatin medication reviewed this with the patient CPK is elevated and on the advice of his cardiologist he has already held his rosuvastatin    Plan is to repeat lipid profile in 4 months continue Zetia 10 mg daily for now       Diagnoses and all orders for this visit:    Essential hypertension  -     Comprehensive metabolic panel; Future    Abnormal glucose  -     Comprehensive metabolic panel; Future    Hypercholesterolemia  -     Lipid panel; Future    SKYLER (obstructive sleep apnea)    CAD in native artery    Abnormal PSA    Myalgia    Other orders  -     Discontinue: terazosin (HYTRIN) 5 mg capsule  -     torsemide (DEMADEX) 10 mg tablet; TAKE ONE A DAY AS NEEDED FOR EDEMA (Patient not taking: Reported on 5/17/2022)        Subjective:      Patient ID: Belen Irvin  is a 64 y o  male  This 56-year-old gentleman presents today for a routine 4 month follow-up visit  He has been experiencing a significant increase in myalgia type symptoms over the past several months  He discuss this with his cardiologist on his recent visit with him  His cardiologist asked him to have a sed rate and CPK  The CPK value has returned elevated  Based upon advice from the patient's cardiologist he was told to discontinue his statin therapy which she has done  He discontinued the statin but continues on Zetia at this time  He is proximally 2 weeks into observation  Without statin therapy indicates that his pain is starting to improve  Patient denies any recent chest pains or palpitations  He does sometimes find it necessary to take a nap when he comes home from work for 20 or 30 minutes  He continues to cut his grass after long days working  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 who has advancing dementia  Her short-term memory is quite limited and he has to remind her to take her medications  He is on Lexapro at this time for the stresses of being caregiver for his wife we discussed possibly increasing his dose from 10 mg to 20 mg at the present time he prefers not to increase the medication dose      I have recommended that the patient start taking vitamin-D 2000 units daily       The following portions of the patient's history were reviewed and updated as appropriate:   He  has a past medical history of Elevated liver enzymes, Hypertension, and TIA (transient ischemic attack)  He   Patient Active Problem List    Diagnosis Date Noted    Myalgia 05/17/2022    Abnormal PSA 09/08/2021    Central apnea     SKYLER (obstructive sleep apnea)     Snoring 12/20/2019    SOB (shortness of breath) 12/10/2019    Anxiety 12/10/2019    Supraventricular tachycardia ([address] ) 12/19/2018    Renal insufficiency 07/31/2018    Essential hypertension 04/11/2018    Abnormal glucose 04/11/2018    Over weight 04/11/2018    Anticoagulant long-term use 10/11/2017    ED (erectile dysfunction) 08/10/2017    Hypercholesterolemia 08/10/2017    Hypertriglyceridemia 09/12/2016    CAD in native artery 05/12/2016    TIA (transient ischemic attack) 02/24/2016    Lumbar canal stenosis 09/13/2013     He  has a past surgical history that includes Cardiac surgery  His family history includes Heart failure in his father and mother; Stroke in his brother  He  reports that he has never smoked  He has never used smokeless tobacco  He reports that he does not drink alcohol and does not use drugs    Current Outpatient Medications   Medication Sig Dispense Refill    aspirin 81 MG tablet Take 81 mg by mouth      diltiazem (CARDIZEM CD) 120 mg 24 hr capsule Take 120 mg by mouth      escitalopram (LEXAPRO) 10 mg tablet TAKE 1 TABLET BY MOUTH  DAILY 90 tablet 2    ezetimibe (ZETIA) 10 mg tablet Take 1 tablet (10 mg total) by mouth daily 30 tablet 5    isosorbide mononitrate (IMDUR) 30 mg 24 hr tablet Take 30 mg by mouth daily      Multiple Vitamins-Minerals (CENTRUM SILVER PO) Take by mouth      nitroglycerin (NITROSTAT) 0 4 mg SL tablet Place 1 tablet under the tongue every 5 (five) minutes as needed      Omega-3 Fatty Acids (FISH OIL) 1200 MG CAPS Take by mouth      terazosin (HYTRIN) 1 mg capsule Take 2 mg by mouth daily      warfarin (COUMADIN) 5 mg tablet Take 5 mg by mouth      torsemide (DEMADEX) 10 mg tablet TAKE ONE A DAY AS NEEDED FOR EDEMA (Patient not taking: Reported on 5/17/2022)       No current facility-administered medications for this visit       Review of Systems   Constitutional: Positive for fatigue  Musculoskeletal: Positive for myalgias  All other systems reviewed and are negative  Objective:      /78   Pulse 74   Temp (!) 96 9 °F (36 1 °C)   Ht 5' 10" (1 778 m)   Wt 92 1 kg (203 lb)   SpO2 94%   BMI 29 13 kg/m²          Physical Exam  Constitutional:       General: He is not in acute distress  Appearance: He is well-developed  He is not ill-appearing  HENT:      Head: Normocephalic  Right Ear: Hearing and external ear normal       Left Ear: Hearing and external ear normal       Nose: Nose normal    Eyes:      Conjunctiva/sclera: Conjunctivae normal       Pupils: Pupils are equal, round, and reactive to light  Neck:      Thyroid: No thyromegaly  Cardiovascular:      Rate and Rhythm: Normal rate and regular rhythm  Heart sounds: Normal heart sounds, S1 normal and S2 normal  No murmur heard  Pulmonary:      Effort: Pulmonary effort is normal  No respiratory distress  Breath sounds: Normal breath sounds  No wheezing, rhonchi or rales  Abdominal:      General: Bowel sounds are normal       Palpations: Abdomen is soft  Tenderness: There is no abdominal tenderness  Musculoskeletal:         General: Normal range of motion  Right lower leg: No edema  Left lower leg: No edema  Lymphadenopathy:      Cervical: No cervical adenopathy  Skin:     General: Skin is warm and dry  Neurological:      Mental Status: He is alert and oriented to person, place, and time  Mental status is at baseline  Deep Tendon Reflexes: Reflexes are normal and symmetric   Reflexes normal    Psychiatric:         Behavior: Behavior normal  Thought Content:  Thought content normal          Judgment: Judgment normal

## 2022-06-02 ENCOUNTER — APPOINTMENT (OUTPATIENT)
Dept: LAB | Age: 77
End: 2022-06-02
Payer: MEDICARE

## 2022-06-02 DIAGNOSIS — R74.8 ELEVATED CPK: ICD-10-CM

## 2022-06-02 LAB
CK MB SERPL-MCNC: 1.3 % (ref 0–2.5)
CK MB SERPL-MCNC: 5.4 NG/ML (ref 0–5)
CK SERPL-CCNC: 402 U/L (ref 39–308)

## 2022-06-02 PROCEDURE — 36415 COLL VENOUS BLD VENIPUNCTURE: CPT

## 2022-06-02 PROCEDURE — 82550 ASSAY OF CK (CPK): CPT

## 2022-06-02 PROCEDURE — 82553 CREATINE MB FRACTION: CPT

## 2022-06-15 ENCOUNTER — TELEPHONE (OUTPATIENT)
Dept: INTERNAL MEDICINE CLINIC | Facility: CLINIC | Age: 77
End: 2022-06-15
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
## 2022-06-15 NOTE — TELEPHONE ENCOUNTER
Pt hasnt felt well in a while  He has some of the symptoms of Lymes and his cardiologist also feels it would be a good idea to be teted  If placing order, I will call him to let him know

## 2022-06-17 ENCOUNTER — LAB (OUTPATIENT)
Dept: LAB | Age: 77
End: 2022-06-17
Payer: MEDICARE
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
## 2022-06-17 LAB — B BURGDOR IGG+IGM SER-ACNC: 5 AI

## 2022-06-17 PROCEDURE — 36415 COLL VENOUS BLD VENIPUNCTURE: CPT

## 2022-06-17 PROCEDURE — 86618 LYME DISEASE ANTIBODY: CPT

## 2022-06-17 PROCEDURE — 86617 LYME DISEASE ANTIBODY: CPT

## 2022-06-19 LAB
B BURGDOR IGG PATRN SER IB-IMP: NEGATIVE
B BURGDOR IGM PATRN SER IB-IMP: POSITIVE
B BURGDOR18KD IGG SER QL IB: ABNORMAL
B BURGDOR23KD IGG SER QL IB: ABNORMAL
B BURGDOR23KD IGM SER QL IB: PRESENT
B BURGDOR28KD IGG SER QL IB: ABNORMAL
B BURGDOR30KD IGG SER QL IB: ABNORMAL
B BURGDOR39KD IGG SER QL IB: ABNORMAL
B BURGDOR39KD IGM SER QL IB: PRESENT
B BURGDOR41KD IGG SER QL IB: PRESENT
B BURGDOR41KD IGM SER QL IB: ABNORMAL
B BURGDOR45KD IGG SER QL IB: ABNORMAL
B BURGDOR58KD IGG SER QL IB: PRESENT
B BURGDOR66KD IGG SER QL IB: ABNORMAL
B BURGDOR93KD IGG SER QL IB: PRESENT

## 2022-06-20 DIAGNOSIS — A69.20 LYME DISEASE: Primary | ICD-10-CM

## 2022-06-20 RX ORDER — DOXYCYCLINE HYCLATE 100 MG/1
100 CAPSULE ORAL EVERY 12 HOURS SCHEDULED
Qty: 42 CAPSULE | Refills: 0 | Status: SHIPPED | OUTPATIENT
Start: 2022-06-20 | End: 2022-07-11

## 2022-06-20 NOTE — RESULT ENCOUNTER NOTE
Called pt and spoke with wife as Flo Job was not home  She stated he will call back to go over everything

## 2022-06-21 ENCOUNTER — TELEPHONE (OUTPATIENT)
Dept: OTHER | Facility: OTHER | Age: 77
End: 2022-06-21

## 2022-07-13 ENCOUNTER — TELEPHONE (OUTPATIENT)
Dept: INTERNAL MEDICINE CLINIC | Facility: CLINIC | Age: 77
End: 2022-07-13

## 2022-07-13 DIAGNOSIS — I10 ESSENTIAL HYPERTENSION: Primary | ICD-10-CM

## 2022-07-13 DIAGNOSIS — M79.10 MYALGIA: ICD-10-CM

## 2022-07-13 DIAGNOSIS — R74.8 ELEVATED LIVER ENZYMES: ICD-10-CM

## 2022-07-13 NOTE — TELEPHONE ENCOUNTER
Patient has completed a full 21 day course of doxycycline for Lyme disease  States he still feeling poorly  Does not have the energy he had in the past some muscle aches and pains  Unsure as to exact etiology of his continued illness but will go for some basic blood tests including comprehensive metabolic profile CBC urinalysis and CK levels  Is urged to make an appointment for follow-up next week with Dr Josefa Julien when he returns from vacation

## 2022-07-13 NOTE — TELEPHONE ENCOUNTER
Patient states he finished is antibiotic for lyme disease yesterday  He wants to know if there is anything else he needs to do?

## 2022-07-16 ENCOUNTER — APPOINTMENT (OUTPATIENT)
Dept: LAB | Age: 77
End: 2022-07-16
Payer: MEDICARE

## 2022-07-16 DIAGNOSIS — M79.10 MYALGIA: ICD-10-CM

## 2022-07-16 DIAGNOSIS — R74.8 ELEVATED LIVER ENZYMES: ICD-10-CM

## 2022-07-16 DIAGNOSIS — I10 ESSENTIAL HYPERTENSION: ICD-10-CM

## 2022-07-16 LAB
ALBUMIN SERPL BCP-MCNC: 3.6 G/DL (ref 3.5–5)
ALP SERPL-CCNC: 78 U/L (ref 46–116)
ALT SERPL W P-5'-P-CCNC: 35 U/L (ref 12–78)
ANION GAP SERPL CALCULATED.3IONS-SCNC: 3 MMOL/L (ref 4–13)
AST SERPL W P-5'-P-CCNC: 27 U/L (ref 5–45)
BACTERIA UR QL AUTO: ABNORMAL /HPF
BASOPHILS # BLD AUTO: 0.03 THOUSANDS/ΜL (ref 0–0.1)
BASOPHILS NFR BLD AUTO: 1 % (ref 0–1)
BILIRUB SERPL-MCNC: 0.81 MG/DL (ref 0.2–1)
BILIRUB UR QL STRIP: NEGATIVE
BUN SERPL-MCNC: 18 MG/DL (ref 5–25)
CALCIUM SERPL-MCNC: 9.5 MG/DL (ref 8.3–10.1)
CAOX CRY URNS QL MICRO: ABNORMAL /HPF
CHLORIDE SERPL-SCNC: 111 MMOL/L (ref 100–108)
CK MB SERPL-MCNC: 1.8 % (ref 0–2.5)
CK MB SERPL-MCNC: 5.5 NG/ML (ref 0–5)
CK SERPL-CCNC: 302 U/L (ref 39–308)
CLARITY UR: CLEAR
CO2 SERPL-SCNC: 29 MMOL/L (ref 21–32)
COLOR UR: YELLOW
CREAT SERPL-MCNC: 1.21 MG/DL (ref 0.6–1.3)
EOSINOPHIL # BLD AUTO: 0.18 THOUSAND/ΜL (ref 0–0.61)
EOSINOPHIL NFR BLD AUTO: 3 % (ref 0–6)
ERYTHROCYTE [DISTWIDTH] IN BLOOD BY AUTOMATED COUNT: 12.5 % (ref 11.6–15.1)
GFR SERPL CREATININE-BSD FRML MDRD: 56 ML/MIN/1.73SQ M
GLUCOSE P FAST SERPL-MCNC: 107 MG/DL (ref 65–99)
GLUCOSE UR STRIP-MCNC: NEGATIVE MG/DL
HCT VFR BLD AUTO: 48 % (ref 36.5–49.3)
HGB BLD-MCNC: 15.8 G/DL (ref 12–17)
HGB UR QL STRIP.AUTO: NEGATIVE
IMM GRANULOCYTES # BLD AUTO: 0.02 THOUSAND/UL (ref 0–0.2)
IMM GRANULOCYTES NFR BLD AUTO: 0 % (ref 0–2)
KETONES UR STRIP-MCNC: NEGATIVE MG/DL
LEUKOCYTE ESTERASE UR QL STRIP: NEGATIVE
LYMPHOCYTES # BLD AUTO: 2.09 THOUSANDS/ΜL (ref 0.6–4.47)
LYMPHOCYTES NFR BLD AUTO: 32 % (ref 14–44)
MCH RBC QN AUTO: 32.1 PG (ref 26.8–34.3)
MCHC RBC AUTO-ENTMCNC: 32.9 G/DL (ref 31.4–37.4)
MCV RBC AUTO: 98 FL (ref 82–98)
MONOCYTES # BLD AUTO: 0.62 THOUSAND/ΜL (ref 0.17–1.22)
MONOCYTES NFR BLD AUTO: 9 % (ref 4–12)
NEUTROPHILS # BLD AUTO: 3.67 THOUSANDS/ΜL (ref 1.85–7.62)
NEUTS SEG NFR BLD AUTO: 55 % (ref 43–75)
NITRITE UR QL STRIP: NEGATIVE
NON-SQ EPI CELLS URNS QL MICRO: ABNORMAL /HPF
NRBC BLD AUTO-RTO: 0 /100 WBCS
PH UR STRIP.AUTO: 5.5 [PH]
PLATELET # BLD AUTO: 194 THOUSANDS/UL (ref 149–390)
PMV BLD AUTO: 10.2 FL (ref 8.9–12.7)
POTASSIUM SERPL-SCNC: 3.8 MMOL/L (ref 3.5–5.3)
PROT SERPL-MCNC: 7.2 G/DL (ref 6.4–8.2)
PROT UR STRIP-MCNC: ABNORMAL MG/DL
RBC # BLD AUTO: 4.92 MILLION/UL (ref 3.88–5.62)
RBC #/AREA URNS AUTO: ABNORMAL /HPF
SODIUM SERPL-SCNC: 143 MMOL/L (ref 136–145)
SP GR UR STRIP.AUTO: 1.02 (ref 1–1.03)
UROBILINOGEN UR STRIP-ACNC: <2 MG/DL
WBC # BLD AUTO: 6.61 THOUSAND/UL (ref 4.31–10.16)
WBC #/AREA URNS AUTO: ABNORMAL /HPF

## 2022-07-16 PROCEDURE — 36415 COLL VENOUS BLD VENIPUNCTURE: CPT

## 2022-07-16 PROCEDURE — 81001 URINALYSIS AUTO W/SCOPE: CPT

## 2022-07-16 PROCEDURE — 80053 COMPREHEN METABOLIC PANEL: CPT

## 2022-07-16 PROCEDURE — 82550 ASSAY OF CK (CPK): CPT

## 2022-07-16 PROCEDURE — 85025 COMPLETE CBC W/AUTO DIFF WBC: CPT

## 2022-07-16 PROCEDURE — 82553 CREATINE MB FRACTION: CPT
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
## 2022-07-18 DIAGNOSIS — A69.20 EARLY LOCALIZED LYME INFECTION: Primary | ICD-10-CM

## 2022-07-20 NOTE — ASSESSMENT & PLAN NOTE
Outpatient Rehabilitation - Wound/Debridement Treatment Note  [facility]     Patient Name: Jesse Siu  : 1937  MRN: [id number]  Today's Date: 2022                 Admit Date: 2022    Visit Dx:    ICD-10-CM ICD-9-CM   1. Foot ulceration, right, with necrosis of muscle ([facility])  L97.513 707.15     728.89   2. Status post amputation of toe of right foot ([facility])  Z89.421 V49.72   3. Cellulitis of right foot  L03.115 682.7         Patient Active Problem List   Diagnosis   • Cellulitis of right foot   • Type 2 diabetes mellitus with hyperglycemia (HCC)   • Renal insufficiency   • Foot ulceration, right, with necrosis of muscle ([facility])        Past Medical History:   Diagnosis Date   • Hyperlipidemia    • Hypertension    • Pre-diabetes         Past Surgical History:   Procedure Laterality Date   • AMPUTATION DIGIT Right 2022    Procedure: GREAT AND SECOND TOE AMPUTATION RIGHT;  Surgeon: Lemuel Angela MD;  Location: [facility];  Service: Vascular;  Laterality: Right;   • FOOT SURGERY Left     CANCER REMOVAL   • TONSILLECTOMY     • TURP / TRANSURETHRAL INCISION / DRAINAGE PROSTATE           EVALUATION   PT Ortho     Row Name 22 1030       Subjective Comments    Subjective Comments Pt without complaints, accompanied by cousin today.  -       Subjective Pain    Able to rate subjective pain? yes  -VANI    Pre-Treatment Pain Level 0  -JM    Post-Treatment Pain Level 0  -JM       Transfers    Comment, (Transfers) seated in w/c for tx  -JM          User Key  (r) = Recorded By, (t) = Taken By, (c) = Cosigned By    Initials Name Provider Type    Nereyda Beck PT Physical Therapist                 [facility] Wound     Row Name 22 1030             Wound 22 1200 Right medial foot Amputation stump    Wound - Properties Group Placement Date: 22  -MF Placement Time: 1200  -MF Side: Right  -MF Orientation: medial  -MF Location: foot  -MF Primary Wound Type: Amputation s  -MF      Wound Image  Kidney evaluation includes review of the patient's creatinine and GFR on his most recent comprehensive metabolic profile  Values appear to be stable he is encouraged to avoid long-term use of nonsteroidal anti-inflammatories  View All Images View Images  -JM      Dressing Appearance dry;intact  -JM      Base granulating;moist;pink;red;exposed structure;slough;subcutaneous;yellow;white  -JM      Periwound intact;moist;pink;macerated;pale white  -      Periwound Temperature warm  -JM      Periwound Skin Turgor soft  -      Edges irregular  -JM      Wound Length (cm) 1 cm  -JM      Wound Width (cm) 8.6 cm  -JM      Wound Depth (cm) 2.2 cm  -JM      Wound Surface Area (cm^2) 8.6 cm^2  -JM      Wound Volume (cm^3) 18.92 cm^3  -JM      Drainage Characteristics/Odor serosanguineous;serous  -JM      Drainage Amount small  -      Care, Wound cleansed with;wound cleanser;debrided;negative pressure wound therapy  -      Dressing Care dressing changed  -      Periwound Care barrier film applied;cleansed with pH balanced cleanser;dry periwound area maintained  nystatin powder/nosting crust x2 layers  -      Wound Output (mL) 125  -JM      Retired Wound - Properties Group Placement Date: 05/20/22  - Placement Time: 1200  -MF Side: Right  - Orientation: medial  - Location: foot  -MF Primary Wound Type: Amputation s  -MF      Retired Wound - Properties Group Date first assessed: 05/20/22  - Time first assessed: 1200  -MF Side: Right  - Location: foot  -MF Primary Wound Type: Amputation s  -MF              NPWT (Negative Pressure Wound Therapy) 05/20/22 1200 R foot ampuation site    NPWT (Negative Pressure Wound Therapy) - Properties Group Placement Date: 05/20/22  - Placement Time: 1200  -MF Location: R foot ampuation site  -      Therapy Setting continuous therapy  -      Dressing foam, black  -JM      Contact Layer other (see comments)  zuri ag  -      Pressure Setting 150 mmHg  -      Sponges Inserted 2  -JM      Sponges Removed 2  -JM      Finger sweep complete Yes  -JM      Retired NPWT (Negative Pressure Wound Therapy) - Properties Group Placement Date: 05/20/22  - Placement Time: 1200  -MF Location: R foot  ampuation site  -      Retired NPWT (Negative Pressure Wound Therapy) - Properties Group Placement Date: 05/20/22  - Placement Time: 1200  - Location: R foot ampuation site  -            User Key  (r) = Recorded By, (t) = Taken By, (c) = Cosigned By    Initials Name Provider Type    Wesley Vallejo, PT Physical Therapist    Nereyda Beck, PT Physical Therapist                  WOUND DEBRIDEMENT  Total area of Debridement: 3cmsq  Debridement Site 1  Location- Site 1: foot amputation site.  Selective Debridement- Site 1: Wound Surface <20cmsq  Instruments- Site 1: tweezers  Excised Tissue Description- Site 1: minimum, slough  Bleeding- Site 1: scant              Therapy Education     Row Name 07/20/22 1030             Therapy Education    Education Details Continue NWB, leg elevation, nutrition for wound healing  -VANI      Given Bandaging/dressing change  -      Program Reinforced  -VANI      How Provided Verbal;Demonstration  -      Provided to Patient;Caregiver  -VANI      Level of Understanding Verbalized  -            User Key  (r) = Recorded By, (t) = Taken By, (c) = Cosigned By    Initials Name Provider Type    Nereyda Beck, PT Physical Therapist                Recommendation and Plan   PT Assessment/Plan     Row Name 07/20/22 1030          PT Assessment    Functional Limitations Performance in self-care ADL;Limitations in functional capacity and performance;Limitations in community activities  -VANI     Impairments Integumentary integrity  -     Assessment Comments Pt's R foot wound with continued decrease in dimensions, increasing granulation.  Pt still with exposed bone and CT in depth, but distal aspect fully granulated with new epithelialization at edges.  Continue current POC.  -            PT Plan    PT Frequency 2x/week  -     Physical Therapy Interventions (Optional Details) patient/family education;wound care  -     PT Plan Comments vac, debridement  -            User Key  (r) = Recorded By, (t) = Taken By, (c) = Cosigned By    Initials Name Provider Type    Nereyda Beck, PT Physical Therapist                Goals   PT OP Goals     Row Name 07/20/22 1030          Time Calculation    PT Goal Re-Cert Due Date 08/18/22  -VANI           User Key  (r) = Recorded By, (t) = Taken By, (c) = Cosigned By    Initials Name Provider Type    Nereyda Beck, PT Physical Therapist                PT Goal Re-Cert Due Date: 08/18/22            Time Calculation: Start Time: 1030  Untimed Charges  62717-Wsqfwitse debridement: 10  62956-Ago Pressure wound to 50 sqcm: 25  Total Minutes  Untimed Charges Total Minutes: 35   Total Minutes: 35  Therapy Charges for Today     Code Description Service Date Service Provider Modifiers Qty    23587254050 HC JOSE DEBRIDE OPEN WOUND UP TO 20CM 7/20/2022 Nereyda Bello, PT GP 1    37598357417 HC PT NEG PRESS WOUND TO 50SQCM DME2 7/20/2022 Nereyda Bello, PT GP 1                  Nereyda Bello, PT  7/20/2022

## 2022-08-15 ENCOUNTER — APPOINTMENT (OUTPATIENT)
Dept: LAB | Age: 77
End: 2022-08-15
Payer: MEDICARE

## 2022-08-15 DIAGNOSIS — G62.9 NEUROPATHY: ICD-10-CM

## 2022-08-15 DIAGNOSIS — R26.9 ABNORMALITY OF GAIT: ICD-10-CM

## 2022-08-15 LAB
FOLATE SERPL-MCNC: >20 NG/ML (ref 3.1–17.5)
TSH SERPL DL<=0.05 MIU/L-ACNC: 1.99 UIU/ML (ref 0.45–4.5)
VIT B12 SERPL-MCNC: 444 PG/ML (ref 100–900)

## 2022-08-15 PROCEDURE — 84165 PROTEIN E-PHORESIS SERUM: CPT | Performed by: PATHOLOGY

## 2022-08-15 PROCEDURE — 82746 ASSAY OF FOLIC ACID SERUM: CPT

## 2022-08-15 PROCEDURE — 36415 COLL VENOUS BLD VENIPUNCTURE: CPT

## 2022-08-15 PROCEDURE — 84165 PROTEIN E-PHORESIS SERUM: CPT

## 2022-08-15 PROCEDURE — 84443 ASSAY THYROID STIM HORMONE: CPT

## 2022-08-15 PROCEDURE — 84166 PROTEIN E-PHORESIS/URINE/CSF: CPT | Performed by: PATHOLOGY

## 2022-08-15 PROCEDURE — 82607 VITAMIN B-12: CPT

## 2022-08-17 LAB
ALBUMIN SERPL ELPH-MCNC: 4.25 G/DL (ref 3.5–5)
ALBUMIN SERPL ELPH-MCNC: 59 % (ref 52–65)
ALPHA1 GLOB SERPL ELPH-MCNC: 0.26 G/DL (ref 0.1–0.4)
ALPHA1 GLOB SERPL ELPH-MCNC: 3.6 % (ref 2.5–5)
ALPHA2 GLOB SERPL ELPH-MCNC: 0.66 G/DL (ref 0.4–1.2)
ALPHA2 GLOB SERPL ELPH-MCNC: 9.2 % (ref 7–13)
BETA GLOB ABNORMAL SERPL ELPH-MCNC: 0.36 G/DL (ref 0.4–0.8)
BETA1 GLOB SERPL ELPH-MCNC: 5 % (ref 5–13)
BETA2 GLOB SERPL ELPH-MCNC: 5.9 % (ref 2–8)
BETA2+GAMMA GLOB SERPL ELPH-MCNC: 0.42 G/DL (ref 0.2–0.5)
GAMMA GLOB ABNORMAL SERPL ELPH-MCNC: 1.25 G/DL (ref 0.5–1.6)
GAMMA GLOB SERPL ELPH-MCNC: 17.3 % (ref 12–22)
IGG/ALB SER: 1.44 {RATIO} (ref 1.1–1.8)
PROT PATTERN SERPL ELPH-IMP: ABNORMAL
PROT SERPL-MCNC: 7.2 G/DL (ref 6.4–8.2)

## 2022-08-28 DIAGNOSIS — F41.9 ANXIETY: ICD-10-CM

## 2022-08-28 RX ORDER — ESCITALOPRAM OXALATE 10 MG/1
TABLET ORAL
Qty: 90 TABLET | Refills: 2 | Status: SHIPPED | OUTPATIENT
Start: 2022-08-28

## 2022-08-31 ENCOUNTER — OFFICE VISIT (OUTPATIENT)
Dept: UROLOGY | Facility: CLINIC | Age: 77
End: 2022-08-31
Payer: MEDICARE

## 2022-08-31 VITALS
BODY MASS INDEX: 28.77 KG/M2 | WEIGHT: 201 LBS | HEART RATE: 83 BPM | DIASTOLIC BLOOD PRESSURE: 82 MMHG | HEIGHT: 70 IN | OXYGEN SATURATION: 98 % | SYSTOLIC BLOOD PRESSURE: 146 MMHG

## 2022-08-31 DIAGNOSIS — N40.1 BPH WITH OBSTRUCTION/LOWER URINARY TRACT SYMPTOMS: ICD-10-CM

## 2022-08-31 DIAGNOSIS — R97.20 ELEVATED PSA: Primary | ICD-10-CM

## 2022-08-31 DIAGNOSIS — N13.8 BPH WITH OBSTRUCTION/LOWER URINARY TRACT SYMPTOMS: ICD-10-CM

## 2022-08-31 PROCEDURE — 99213 OFFICE O/P EST LOW 20 MIN: CPT | Performed by: PHYSICIAN ASSISTANT

## 2022-08-31 RX ORDER — TERAZOSIN 5 MG/1
CAPSULE ORAL
COMMUNITY
Start: 2022-06-27

## 2022-08-31 RX ORDER — CANDESARTAN 16 MG/1
TABLET ORAL
COMMUNITY
Start: 2022-08-26

## 2022-08-31 RX ORDER — TAMSULOSIN HYDROCHLORIDE 0.4 MG/1
0.4 CAPSULE ORAL
Qty: 90 CAPSULE | Refills: 3 | Status: SHIPPED | OUTPATIENT
Start: 2022-08-31

## 2022-08-31 NOTE — PROGRESS NOTES
UROLOGY PROGRESS NOTE   Patient Identifiers: Hui Katz (MRN [id number])  Date of Service: 8/31/2022    Subjective:    13-year-old man history of BPH  He had a TURP by Dr Arslan Mendes in 2006  I saw him in January with a concern of rising PSA  At that time his PSA was 3 4  Current PSA is nothing 9 03  Complains of urinary frequency  Occasional urge leak  Reason for visit:  Elevated PSA follow-up    Objective:     VITALS:    There were no vitals filed for this visit    AUA SYMPTOM SCORE    Flowsheet Row Most Recent Value   AUA SYMPTOM SCORE    How often have you had a sensation of not emptying your bladder completely after you finished urinating? 3 (P)     How often have you had to urinate again less than two hours after you finished urinating? 3 (P)     How often have you found you stopped and started again several times when you urinate? 1 (P)     How often have you found it difficult to postpone urination? 4 (P)     How often have you had a weak urinary stream? 3 (P)     How often have you had to push or strain to begin urination? 2 (P)     How many times did you most typically get up to urinate from the time you went to bed at night until the time you got up in the morning? 4 (P)     Quality of Life: If you were to spend the rest of your life with your urinary condition just the way it is now, how would you feel about that? 3 (P)     AUA SYMPTOM SCORE 20 (P)             LABS:  Lab Results   Component Value Date    HGB 15 8 07/16/2022    HCT 48 0 07/16/2022    WBC 6 61 07/16/2022     07/16/2022   ]    Lab Results   Component Value Date     (H) 09/26/2015    K 3 8 07/16/2022     (H) 07/16/2022    CO2 29 07/16/2022    BUN 18 07/16/2022    CREATININE 1 21 07/16/2022    CALCIUM 9 5 07/16/2022    GLUCOSE 99 12/18/2019   ]        INPATIENT MEDS:    Current Outpatient Medications:     aspirin 81 MG tablet, Take 81 mg by mouth, Disp: , Rfl:     diltiazem (CARDIZEM CD) 120 mg 24 hr capsule, Take 120 mg by mouth, Disp: , Rfl:     escitalopram (LEXAPRO) 10 mg tablet, TAKE 1 TABLET BY MOUTH  DAILY, Disp: 90 tablet, Rfl: 2    ezetimibe (ZETIA) 10 mg tablet, Take 1 tablet (10 mg total) by mouth daily, Disp: 30 tablet, Rfl: 5    isosorbide mononitrate (IMDUR) 30 mg 24 hr tablet, Take 30 mg by mouth daily, Disp: , Rfl:     Multiple Vitamins-Minerals (CENTRUM SILVER PO), Take by mouth, Disp: , Rfl:     nitroglycerin (NITROSTAT) 0 4 mg SL tablet, Place 1 tablet under the tongue every 5 (five) minutes as needed, Disp: , Rfl:     Omega-3 Fatty Acids (FISH OIL) 1200 MG CAPS, Take by mouth, Disp: , Rfl:     terazosin (HYTRIN) 1 mg capsule, Take 2 mg by mouth daily, Disp: , Rfl:     torsemide (DEMADEX) 10 mg tablet, TAKE ONE A DAY AS NEEDED FOR EDEMA (Patient not taking: Reported on 5/17/2022), Disp: , Rfl:     warfarin (COUMADIN) 5 mg tablet, Take 5 mg by mouth, Disp: , Rfl:       Physical Exam:   There were no vitals taken for this visit  GEN: no acute distress    RESP: breathing comfortably with no accessory muscle use    ABD: soft, non-tender, non-distended   INCISION:    EXT: no significant peripheral edema         RADIOLOGY:   None     Assessment:   1  Elevated PSA     Plan:   -will get multiparametric MRI and arrange for biopsy call him with the results will also repeat his PSA  -I added tamsulosin to his regime and cautioned him about dizziness he had been on 0 8 mg previous to his TURP    -  -

## 2022-09-19 ENCOUNTER — OFFICE VISIT (OUTPATIENT)
Dept: INTERNAL MEDICINE CLINIC | Facility: CLINIC | Age: 77
End: 2022-09-19
Payer: MEDICARE

## 2022-09-19 VITALS
WEIGHT: 200.4 LBS | BODY MASS INDEX: 28.69 KG/M2 | DIASTOLIC BLOOD PRESSURE: 84 MMHG | SYSTOLIC BLOOD PRESSURE: 140 MMHG | OXYGEN SATURATION: 97 % | TEMPERATURE: 97.7 F | HEIGHT: 70 IN | HEART RATE: 78 BPM

## 2022-09-19 DIAGNOSIS — R73.09 ABNORMAL GLUCOSE: ICD-10-CM

## 2022-09-19 DIAGNOSIS — I25.10 CAD IN NATIVE ARTERY: ICD-10-CM

## 2022-09-19 DIAGNOSIS — I10 ESSENTIAL HYPERTENSION: Primary | ICD-10-CM

## 2022-09-19 DIAGNOSIS — F41.9 ANXIETY: ICD-10-CM

## 2022-09-19 DIAGNOSIS — E78.1 HYPERTRIGLYCERIDEMIA: ICD-10-CM

## 2022-09-19 DIAGNOSIS — E78.00 HYPERCHOLESTEROLEMIA: ICD-10-CM

## 2022-09-19 DIAGNOSIS — R74.8 ELEVATED LIVER ENZYMES: ICD-10-CM

## 2022-09-19 PROCEDURE — 99215 OFFICE O/P EST HI 40 MIN: CPT | Performed by: INTERNAL MEDICINE

## 2022-09-19 RX ORDER — ROSUVASTATIN CALCIUM 20 MG/1
20 TABLET, COATED ORAL DAILY
Qty: 90 TABLET | Refills: 3
Start: 2022-09-19

## 2022-09-19 NOTE — ASSESSMENT & PLAN NOTE
GFR is 69 cc/minute placing the patient in stage II chronic kidney insufficiency recommend continuation of blood pressure control along with avoidance of nonsteroidal anti-inflammatories and avoidance of dehydration state  Follow-up blood work requested in a period of 4 months

## 2022-09-19 NOTE — ASSESSMENT & PLAN NOTE
Cholesterol values have shown a significant increase in value since discontinuation of rosuvastatin  He did have myalgia type symptoms but I suspect this was more related to his Lyme infection which is now fully treated and resolved  He does have a mild CPK value increased but he is off of rosuvastatin so I do not believe it is related to his statin therapy probably would do well with some coenzyme Q10 supplementation  Will restart his rosuvastatin at 20 mg daily continue his Zetia 10 mg daily and follow-up blood work in 4 months    Low-cholesterol diet reviewed

## 2022-09-19 NOTE — ASSESSMENT & PLAN NOTE
Variable blood pressure readings noted by the patient with symptomatic drops of his systolic pressure into the 80s and 90s occasionally especially in the morning  I recommended the following changes to his medication I recommend that he take half his candesartan 16 mg in the morning in the other half of his 16 mg in the evening 12 hours apart set would be 8 mg in the morning and 8 mg later in the day  In addition he can take his Cardizem times  in the morning as at that time release type medication  I have recommended that he take the Hytrin 5 mg in the evening at bedtime  If morning blood pressures remain low consideration for decrease in the Hytrin medication  I recommended that he discuss these changes with his cardiologist to make sure that they agree with our recommendation  Comprehensive metabolic profile pertaining to electrolyte balance and kidney performance was reviewed today

## 2022-09-19 NOTE — PROGRESS NOTES
Name: Hussain Bradley  : 1945      MRN: [id number]  Encounter Provider: Ashish Gallo MD  Encounter Date: 2022   Encounter department: [facility]    Assessment & Plan     1  Essential hypertension  Assessment & Plan:  Variable blood pressure readings noted by the patient with symptomatic drops of his systolic pressure into the 80s and 90s occasionally especially in the morning  I recommended the following changes to his medication I recommend that he take half his candesartan 16 mg in the morning in the other half of his 16 mg in the evening 12 hours apart set would be 8 mg in the morning and 8 mg later in the day  In addition he can take his Cardizem times  in the morning as at that time release type medication  I have recommended that he take the Hytrin 5 mg in the evening at bedtime  If morning blood pressures remain low consideration for decrease in the Hytrin medication  I recommended that he discuss these changes with his cardiologist to make sure that they agree with our recommendation  Comprehensive metabolic profile pertaining to electrolyte balance and kidney performance was reviewed today  Orders:  -     Comprehensive metabolic panel; Future    2  Hypercholesterolemia  Assessment & Plan:  Cholesterol values have shown a significant increase in value since discontinuation of rosuvastatin  He did have myalgia type symptoms but I suspect this was more related to his Lyme infection which is now fully treated and resolved  He does have a mild CPK value increased but he is off of rosuvastatin so I do not believe it is related to his statin therapy probably would do well with some coenzyme Q10 supplementation  Will restart his rosuvastatin at 20 mg daily continue his Zetia 10 mg daily and follow-up blood work in 4 months  Low-cholesterol diet reviewed    Orders:  -     rosuvastatin (CRESTOR) 20 MG tablet;  Take 1 tablet (20 mg total) by mouth daily  -     Lipid panel; Future    3  CAD in native artery  Assessment & Plan:  No recent symptoms of chest pain or palpitations noted by the patient recommend continued use of isosorbide at 30 mg daily along with low-dose aspirin and cholesterol management reestablish with rosuvastatin 20 mg daily along with continuation of Zetia 10 mg daily      4  Abnormal glucose  Assessment & Plan:  Normal fasting blood sugar on most recent blood work values 97 care in consumption of carbohydrates and concentrated sugars reviewed next testing scheduled for 4 months  5  Hypertriglyceridemia  Assessment & Plan:  Triglyceride values show upward trend with a value of 176 off of rosuvastatin  Recommend continue reestablishment of treatment with rosuvastatin at 20 mg daily and Zetia 10 mg daily next testing will be in 4 months  Diet reviewed with the patient to reduce consumption of carbohydrates  6  Elevated liver enzymes  Assessment & Plan:  No evidence of liver enzyme elevation on most recent blood work next testing is scheduled for 4 months  7  Anxiety  Assessment & Plan:  History of anxiety type symptoms associated 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 has Alzheimer's disease  Currently on 10 mg of Lexapro daily  He placed down any stresses that his wife may cause  He indicates that he is fine continuing to take care of her  Subjective      This 68-year-old gentleman returns today for routine 4 month follow-up visit  He was diagnosed with Lyme infection shortly after his last visit with us in treated with 3 weeks of doxycycline 100 mg twice a day  He had persistent hands symptoms of stiffness and tightness in his hands bilaterally was seen after a positive LAURIE by Rheumatology services  Their impression is that this is a tightening of his tendons in the hands not necessarily a autoimmune condition nor is it necessarily related to the Lyme infection    Patient has had some variable blood pressures over the past several weeks  His blood pressures sometimes in the morning are 12W or 24Y systolic  When he gets this low he has lightheadedness and fatigue type symptoms  This is occurred several times  He has not had any syncopal episodes  I he is currently on Hytrin 5 mg at a day along with Cardizem time CD 1 120 mg daily and candesartan 16 mg daily  He normally takes the candesartan and Hytrin at bedtime and the Cardizem time in the morning  Other medications with a secondary effect on his blood pressure include Imdur 30 mg daily and p r n  Use of Demadex at 10 mg on an as-needed basis when he shows swelling in his lower legs or feet  A believe this is more of a timing issue with his blood pressure medications more than anything else  Patient was taken off his rosuvastatin in the past by his cardiologist over concerns of possible myopathy  I believe that most of his muscular pain was probably related to his Lyme infection  He indicates that the muscular pain has resolved  Will rechallenge him with his rosuvastatin medication because his most recent blood work does show a significant increase in cholesterol values on just Zetia 10 mg daily  Review of Systems   Musculoskeletal:        Stiffness in both hands   All other systems reviewed and are negative        Current Outpatient Medications on File Prior to Visit   Medication Sig    aspirin 81 MG tablet Take 81 mg by mouth    candesartan (ATACAND) 16 mg tablet     diltiazem (CARDIZEM CD) 120 mg 24 hr capsule Take 120 mg by mouth    escitalopram (LEXAPRO) 10 mg tablet TAKE 1 TABLET BY MOUTH  DAILY    ezetimibe (ZETIA) 10 mg tablet Take 1 tablet (10 mg total) by mouth daily    Multiple Vitamins-Minerals (CENTRUM SILVER PO) Take by mouth    nitroglycerin (NITROSTAT) 0 4 mg SL tablet Place 1 tablet under the tongue every 5 (five) minutes as needed    Omega-3 Fatty Acids (FISH OIL) 1200 MG CAPS Take by mouth    tamsulosin (FLOMAX) 0 4 mg Take 1 capsule (0 4 mg total) by mouth daily with dinner    terazosin (HYTRIN) 5 mg capsule     torsemide (DEMADEX) 10 mg tablet TAKE ONE A DAY AS NEEDED FOR EDEMA    warfarin (COUMADIN) 5 mg tablet Take 5 mg by mouth    [DISCONTINUED] terazosin (HYTRIN) 1 mg capsule Take 2 mg by mouth daily    isosorbide mononitrate (IMDUR) 30 mg 24 hr tablet Take 30 mg by mouth daily       Objective     /84   Pulse 78   Temp 97 7 °F (36 5 °C)   Ht 5' 10" (1 778 m)   Wt 90 9 kg (200 lb 6 4 oz)   SpO2 97%   BMI 28 75 kg/m²     Physical Exam  Constitutional:       General: He is not in acute distress  Appearance: He is well-developed  He is not ill-appearing  HENT:      Head: Normocephalic  Right Ear: Hearing and external ear normal       Left Ear: Hearing and external ear normal       Nose: Nose normal    Eyes:      Conjunctiva/sclera: Conjunctivae normal       Pupils: Pupils are equal, round, and reactive to light  Neck:      Thyroid: No thyromegaly  Cardiovascular:      Rate and Rhythm: Normal rate and regular rhythm  Heart sounds: Normal heart sounds, S1 normal and S2 normal  No murmur heard  Pulmonary:      Effort: Pulmonary effort is normal       Breath sounds: Normal breath sounds  No wheezing, rhonchi or rales  Abdominal:      General: Bowel sounds are normal       Palpations: Abdomen is soft  Musculoskeletal:         General: Normal range of motion  Right lower leg: No edema  Left lower leg: No edema  Lymphadenopathy:      Cervical: No cervical adenopathy  Skin:     General: Skin is warm and dry  Neurological:      Mental Status: He is alert and oriented to person, place, and time  Mental status is at baseline  Deep Tendon Reflexes: Reflexes are normal and symmetric  Psychiatric:         Behavior: Behavior normal          Thought Content:  Thought content normal          Judgment: Judgment normal        Doron Morrow MD

## 2022-09-19 NOTE — ASSESSMENT & PLAN NOTE
No evidence of liver enzyme elevation on most recent blood work next testing is scheduled for 4 months

## 2022-09-19 NOTE — ASSESSMENT & PLAN NOTE
Triglyceride values show upward trend with a value of 176 off of rosuvastatin  Recommend continue reestablishment of treatment with rosuvastatin at 20 mg daily and Zetia 10 mg daily next testing will be in 4 months  Diet reviewed with the patient to reduce consumption of carbohydrates

## 2022-09-19 NOTE — ASSESSMENT & PLAN NOTE
Normal fasting blood sugar on most recent blood work values 97 care in consumption of carbohydrates and concentrated sugars reviewed next testing scheduled for 4 months

## 2022-09-19 NOTE — ASSESSMENT & PLAN NOTE
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

## 2022-09-19 NOTE — ASSESSMENT & PLAN NOTE
No recent symptoms of chest pain or palpitations noted by the patient recommend continued use of isosorbide at 30 mg daily along with low-dose aspirin and cholesterol management reestablish with rosuvastatin 20 mg daily along with continuation of Zetia 10 mg daily

## 2022-10-19 ENCOUNTER — HOSPITAL ENCOUNTER (OUTPATIENT)
Dept: RADIOLOGY | Age: 77
Discharge: HOME | End: 2022-10-19
Payer: MEDICARE

## 2022-10-19 DIAGNOSIS — R97.20 ELEVATED PSA: ICD-10-CM

## 2022-10-19 PROCEDURE — 72197 MRI PELVIS W/O & W/DYE: CPT

## 2022-10-19 PROCEDURE — 76377 3D RENDER W/INTRP POSTPROCES: CPT

## 2022-10-19 PROCEDURE — A9585 GADOBUTROL INJECTION: HCPCS | Performed by: PHYSICIAN ASSISTANT

## 2022-10-19 RX ADMIN — GADOBUTROL 9 ML: 604.72 INJECTION INTRAVENOUS at 08:46

## 2022-10-25 ENCOUNTER — TELEPHONE (OUTPATIENT)
Dept: OTHER | Facility: OTHER | Age: 77
End: 2022-10-25

## 2022-10-25 NOTE — TELEPHONE ENCOUNTER
Patient called in to follow up on results of MRI from 10/19/22  Please follow up with patient  Last OV 8/13/22  Patient available work # today until 2:30 PM   Available at home # on 10/26/22

## 2022-10-27 DIAGNOSIS — R97.20 ELEVATED PSA: Primary | ICD-10-CM

## 2022-10-27 RX ORDER — LEVOFLOXACIN 500 MG/1
500 TABLET, FILM COATED ORAL EVERY 24 HOURS
Qty: 3 TABLET | Refills: 0 | Status: SHIPPED | OUTPATIENT
Start: 2022-10-27 | End: 2022-10-30

## 2022-10-27 NOTE — TELEPHONE ENCOUNTER
Called and set up patient for prostate bx  Discussed arrival time and meds sent to the pharmacy  He verbalized understanding and all appt dates and times   Advised to call office with concerns between now and then

## 2022-10-27 NOTE — TELEPHONE ENCOUNTER
I spoke to patient reviewed his PSA and his MRI results  I reviewed that he needs a prostate biopsy in the office we discussed the possible risks and side effects  Please schedule him for a transrectal ultrasound and prostate biopsy in the office  I have sent antibiotic and an enema to his pharmacy

## 2022-11-02 ENCOUNTER — TELEPHONE (OUTPATIENT)
Dept: PULMONOLOGY | Facility: CLINIC | Age: 77
End: 2022-11-02

## 2022-11-09 ENCOUNTER — TELEPHONE (OUTPATIENT)
Dept: OTHER | Facility: OTHER | Age: 77
End: 2022-11-09

## 2022-11-09 DIAGNOSIS — R97.20 ELEVATED PSA: ICD-10-CM

## 2022-11-09 NOTE — TELEPHONE ENCOUNTER
Patient is calling to report that he did not receive the bisacodyl (FLEET) 10 MG/30ML ENEM that was sent to his mail order pharmacy to prep for his upcoming procedure He is requesting that it be resent to [address] on file

## 2022-11-15 NOTE — TELEPHONE ENCOUNTER
Pt called the office requested to speak with Yung Veliz regarding Fleet med prior to his procedure  Pt could not get his med,  the pharmacy don't have it       Pt can be reached at [phone]

## 2022-11-15 NOTE — TELEPHONE ENCOUNTER
Spoke with patient and informed him he can purchase any OTC enema in order to clean himself out prior to biopsy  Does not have to contain bisocodyl  Suggested patient call around to pharmacies to see who has it available  Explained to him how to administer an enema and to do so about 2 hr prior to appt  Patient verbalized understanding

## 2022-11-21 ENCOUNTER — PROCEDURE VISIT (OUTPATIENT)
Dept: UROLOGY | Facility: CLINIC | Age: 77
End: 2022-11-21

## 2022-11-21 VITALS
HEIGHT: 70 IN | BODY MASS INDEX: 27.14 KG/M2 | RESPIRATION RATE: 16 BRPM | DIASTOLIC BLOOD PRESSURE: 70 MMHG | WEIGHT: 189.6 LBS | HEART RATE: 90 BPM | OXYGEN SATURATION: 98 % | SYSTOLIC BLOOD PRESSURE: 130 MMHG

## 2022-11-21 DIAGNOSIS — R97.20 ELEVATED PSA: Primary | ICD-10-CM

## 2022-11-21 RX ORDER — BEMPEDOIC ACID AND EZETIMIBE 180; 10 MG/1; MG/1
TABLET, FILM COATED ORAL
COMMUNITY
Start: 2022-11-09

## 2022-11-21 RX ORDER — TERAZOSIN 2 MG/1
CAPSULE ORAL
COMMUNITY
Start: 2022-11-15

## 2022-11-21 RX ORDER — CEFTRIAXONE 1 G/1
1000 INJECTION, POWDER, FOR SOLUTION INTRAMUSCULAR; INTRAVENOUS ONCE
Status: COMPLETED | OUTPATIENT
Start: 2022-11-21 | End: 2022-11-21

## 2022-11-21 RX ADMIN — CEFTRIAXONE 1000 MG: 1 INJECTION, POWDER, FOR SOLUTION INTRAMUSCULAR; INTRAVENOUS at 09:07

## 2022-11-21 NOTE — PROGRESS NOTES
Office TRUS-guided Prostate Biopsy Procedure Note    Indication    Elevated PSA    Informed consent   The risks, benefits and alternatives to TRUS-guided prostate biopsy were conveyed to the patient prior to performing the procedure  A discussion of the risks of the procedure included, but was not limited to: pain, hematuria, hematochezia, hematospermia, infection, and the possibility of a non-diagnostic biopsy  The patient was given the opportunity to have his questions answered and there was no perceived barrier to education  Antibiotic prophylaxis   The patient received the following antibiotics at least 30 minutes prior to undergoing biopsy: Ciprofloxacin 500 mg PO x2   1 g intramuscular Rocephin    Rectal cleansing  The patient was instructed to perform an evacuating rectal enema 1-2 hours prior to biopsy  Local anesthesia  Topical 2% lidocaine jelly was applied liberally to the anus and rectum and allowed to dwell for at least 5 min prior to starting the procedure  After insertion of the TRUS probe, 10 mL of 2% lidocaine solution was injected with ultrasound guidance at the  junction of the prostate and seminal vesicles  The anesthetic was allowed to dwell for at least 2 minutes prior to biopsy  Transrectal ultrasonography  The patient was placed in the left lateral decubitus position  After an attentive digital rectal examination, a 7 5 mHz sidefire ultrasound probe was gently inserted into the rectum and biplanar imaging of the prostate was done with the findings noted below  Images were taken of any abnormal findings and also to document prostate size      Bladder  The bladder base appeared normal     Prostate  Digital rectal exam findings:  - firm right gland    Ultrasound size measurements:  -Volume:  25 cm3    Ultrasound findings:  -Cysts: None  -Masses: None  -Median lobe: absent    Clinical stage (assuming a positive biopsy):   -T2c     TRUS-guided needle biopsy  Using an 18 gauge biopsy needle and ultrasound guidance, the following biopsies were taken:    1 core(s) from the left lateral base  1 core(s) from the left lateral mid-gland  1 core(s) from the right middle base  1 core(s) from the right lateral base  1 core(s) from the left lateral mid-gland  1 core(s) from the left middle mid-gland  1 core(s) from the right middle mid-gland  1 core(s) from the right lateral mid-gland  1 core(s) from the left lateral apex  1 core(s) from the left middle apex  1 core(s) from the right middle apex  1 core(s) from the right lateral apex    Total number of cores: 12                Complications  There were no procedural complications  Disposition  The patient was dismissed to home after 30 minutes of observation in stable condition  Post-procedure instructions: Today he underwent an uncomplicated transrectal ultrasound-guided biopsy of the prostate, following a periprosthetic nerve block  I reviewed the normal postprocedure a course including bleeding per recutm, hematuria, and hematospermia  I instructed him to complete his course of antibiotics as prescribed  Instructed him to call with fever greater than 101, chills, nausea, vomiting, poorly controlled pain  His followup was scheduled in approximately 2 weeks' time to review the pathology

## 2022-12-08 ENCOUNTER — OFFICE VISIT (OUTPATIENT)
Dept: UROLOGY | Facility: CLINIC | Age: 77
End: 2022-12-08

## 2022-12-08 ENCOUNTER — TELEPHONE (OUTPATIENT)
Dept: UROLOGY | Facility: CLINIC | Age: 77
End: 2022-12-08

## 2022-12-08 VITALS
SYSTOLIC BLOOD PRESSURE: 120 MMHG | HEART RATE: 93 BPM | OXYGEN SATURATION: 96 % | BODY MASS INDEX: 26.92 KG/M2 | DIASTOLIC BLOOD PRESSURE: 70 MMHG | HEIGHT: 70 IN | WEIGHT: 188 LBS

## 2022-12-08 DIAGNOSIS — R35.1 BENIGN PROSTATIC HYPERPLASIA WITH NOCTURIA: ICD-10-CM

## 2022-12-08 DIAGNOSIS — N40.1 BENIGN PROSTATIC HYPERPLASIA WITH NOCTURIA: ICD-10-CM

## 2022-12-08 DIAGNOSIS — C61 PROSTATE CANCER (HCC): Primary | ICD-10-CM

## 2022-12-08 DIAGNOSIS — N18.31 STAGE 3A CHRONIC KIDNEY DISEASE (HCC): ICD-10-CM

## 2022-12-08 NOTE — TELEPHONE ENCOUNTER
Patient is scheduled for Lupron on 12/22 at 11 AM      Clerical- watch for scheduling of Radiation Oncology   Also, call to confirm Lupron appt day and time after insurance approval       Provider note;    Return for 1) Gian murillo RN, 2) surgery scheduling, 3) radiation referral

## 2022-12-08 NOTE — TELEPHONE ENCOUNTER
LUPRON 45mg - Medicare/HOP - NO authorization required  Office stock okay to use  Please get ABN signed

## 2022-12-08 NOTE — PROGRESS NOTES
Referring Physician: Jose Limon MD  A copy of this note was sent to the referring physician  Diagnoses and all orders for this visit:    Prostate cancer [facility])  -     Ambulatory Referral to Radiation Oncology; Future  -     Case request operating room: [facility]; Standing  -     Case request operating room: CYSTOSCOPY WITH INSERTION UROLIFT    Stage 3a chronic kidney disease ([address] )    Benign prostatic hyperplasia with nocturia  -     Case request operating room: [address]; Standing  -     Case request operating room: CYSTOSCOPY WITH INSERTION UROLIFT    Other orders  -     Diet NPO; Sips with meds; Standing  -     Place sequential compression device; Standing  -     ceFAZolin (ANCEF) 2,000 mg in dextrose 5 % 100 mL IVPB            Assessment and plan:       1  Newly diagnosed grade group 3 prostate cancer  -13 0  -negative multiparametric MRI  -Grade group 3  - cT2a    3  BPH w severe LUTS  -History of TURP  -Likely can benefit from UroLift given low volume 25 g gland recommended prior to radiation    2 comorbidities: Obstructive sleep apnea, coronary artery disease, supraventricular tachycardia, transient ischemic attack, chronic anticoagulation      We discussed the natural history of prostate cancer, the Jason grading system, and the patient's current staging  He was provided with a copy of his biopsy report  We discussed the options for managing newly diagnosed prostate cancer including active surveillance for low risk disease, radical prostatectomy, and pelvic radiation therapy  We discussed each of these management strategies in detail, with particular emphasis to how they do or do not apply to the patient's current staging  A discussion was guided using criteria for [address] [place] [address] (NCCN) to stratify patient's according to low, intermediate, or high-risk prostate cancer   We discussed the goals of care as #1 being cancer cure, and #2 being preservation of urinary control and erectile function  Chelsy Casper has elected for external beam radiation therapy expensive and deprivation therapy  Pros and cons of all modalities were extensively discussed  With regards to androgen deprivation,  The patient understands that hormonal ablation can be achieved either surgically with orchiectomy or medically with [facility] agonists or antagonists  He understands that hormone treatment may control the cancer but will probably not cure it  We discussed the risks, benefits, possible side effects and alternatives to hormonal therapy  The possible side effects include but are not limited to weight gain (usually subcutaneous fat), osteopenia, hot flashes/temperature dysregulation, impotence, decreased libido, heart attack, insulin-resistance, fatigue, hyperlipidemia, metabolic syndrome and its attendant risks, the emergence of hormone-independent prostate cancer or the need for further treatment  He also understands there may be some association with androgen deprivation therapy and cardiovascular disease  In addition to discussing this, I gave him the opportunity to explore this further with an internist or cardiologist first before beginning therapy  We also spoke of quality of life issues, costs and the need to temporarily take an oral anti-androgen when starting [facility] therapy  His questions were answered to his satisfaction and he voiced understanding  Only we discussed the severity of his lower urinary tract symptoms  Ongoing frequency and urgency and weak stream despite a distant history of a prior TURP  Upon his overall low gland volume I believe he may benefit from a UroLift to optimize his bladder outlet prior to initiating radiation therapy        The risks of Urolift include but are not limited to bleeding, infection, reaction to anesthesia such as heart attack, stroke, DVT/PE, hyponatremia, bladder neck contracture, urethral stricture, injury to surrounding structures (ureters, rectum, etc), complications from implants including capsular tap perforation, development of bladder calculi  We discussed that additional risks of trans urethral resection procedures such as incontinence, retrograde ejaculation, and erectile dysfunction have been only rarely reported with the Uro lift procedure  We did discuss that this is a new were procedure and a permanent implant  We discussed that there may be some long-term implications that her on for seen with this newer technology, such as perhaps complicating treatment for prostate cancer if indicated down the line  Finally, I told him that he may require additional procedures secondary to some of these complications  Informed consent was obtained for the Uro lift procedure  This will be scheduled in the near future to be performed under IV sedation  I have also placed a referral to radiation oncology and the patient will return for nurse visit to administer Lupron in the near future      Ayad Salas MD      Chief Complaint     Biopsy follow-up      History of Present Illness     Michelle Morgan  is a 76 y o  male returns in follow-up  Detailed Urologic History     - please refer to HPI    Review of Systems     Review of Systems   Constitutional: Negative for activity change and fatigue  HENT: Negative for congestion  Eyes: Negative for visual disturbance  Respiratory: Negative for shortness of breath and wheezing  Cardiovascular: Negative for chest pain and leg swelling  Gastrointestinal: Negative for abdominal pain  Endocrine: Negative for polyuria  Genitourinary: Negative for dysuria, flank pain, hematuria and urgency  Musculoskeletal: Negative for back pain  Allergic/Immunologic: Negative for immunocompromised state  Neurological: Negative for dizziness and numbness  Psychiatric/Behavioral: Negative for dysphoric mood     All other systems reviewed and are negative  AUA SYMPTOM SCORE    Flowsheet Row Most Recent Value   AUA SYMPTOM SCORE    How often have you had a sensation of not emptying your bladder completely after you finished urinating? 3 (P)     How often have you had to urinate again less than two hours after you finished urinating? 3 (P)     How often have you found you stopped and started again several times when you urinate? 2 (P)     How often have you found it difficult to postpone urination? 3 (P)     How often have you had a weak urinary stream? 3 (P)     How often have you had to push or strain to begin urination? 2 (P)     How many times did you most typically get up to urinate from the time you went to bed at night until the time you got up in the morning? 2 (P)     Quality of Life: If you were to spend the rest of your life with your urinary condition just the way it is now, how would you feel about that? 3 (P)     AUA SYMPTOM SCORE 18 (P)             Allergies     No Known Allergies    Physical Exam       Physical Exam  Constitutional:       General: He is not in acute distress  Appearance: He is well-developed  HENT:      Head: Normocephalic and atraumatic  Cardiovascular:      Comments: Negative lower extremity edema  Pulmonary:      Effort: Pulmonary effort is normal       Breath sounds: Normal breath sounds  Abdominal:      Palpations: Abdomen is soft  Musculoskeletal:         General: Normal range of motion  Cervical back: Normal range of motion  Skin:     General: Skin is warm  Neurological:      Mental Status: He is alert and oriented to person, place, and time     Psychiatric:         Behavior: Behavior normal      Right side is firm, possibly consistent with T3 exam       Vital Signs  Vitals:    12/08/22 0813   BP: 120/70   BP Location: Left arm   Patient Position: Sitting   Cuff Size: Standard   Pulse: 93   SpO2: 96%   Weight: 85 3 kg (188 lb)   Height: 5' 10" (1 778 m)         Current Medications       Current Outpatient Medications:   •  aspirin 81 MG tablet, Take 81 mg by mouth, Disp: , Rfl:   •  candesartan (ATACAND) 16 mg tablet, , Disp: , Rfl:   •  diltiazem (CARDIZEM CD) 120 mg 24 hr capsule, Take 120 mg by mouth, Disp: , Rfl:   •  escitalopram (LEXAPRO) 10 mg tablet, TAKE 1 TABLET BY MOUTH  DAILY, Disp: 90 tablet, Rfl: 2  •  ezetimibe (ZETIA) 10 mg tablet, Take 1 tablet (10 mg total) by mouth daily, Disp: 30 tablet, Rfl: 5  •  Multiple Vitamins-Minerals (CENTRUM SILVER PO), Take by mouth, Disp: , Rfl:   •  Nexlizet 180-10 MG TABS, , Disp: , Rfl:   •  nitroglycerin (NITROSTAT) 0 4 mg SL tablet, Place 1 tablet under the tongue every 5 (five) minutes as needed, Disp: , Rfl:   •  Omega-3 Fatty Acids (FISH OIL) 1200 MG CAPS, Take by mouth, Disp: , Rfl:   •  tamsulosin (FLOMAX) 0 4 mg, Take 1 capsule (0 4 mg total) by mouth daily with dinner, Disp: 90 capsule, Rfl: 3  •  terazosin (HYTRIN) 2 mg capsule, , Disp: , Rfl:   •  terazosin (HYTRIN) 5 mg capsule, , Disp: , Rfl:   •  torsemide (DEMADEX) 10 mg tablet, TAKE ONE A DAY AS NEEDED FOR EDEMA, Disp: , Rfl:   •  warfarin (COUMADIN) 5 mg tablet, Take 5 mg by mouth, Disp: , Rfl:   •  bisacodyl (FLEET) 10 MG/30ML ENEM, Insert 30 mL (10 mg total) into the rectum once for 1 dose Take the entire dose the morning of the procedure, Disp: 30 mL, Rfl: 0  •  isosorbide mononitrate (IMDUR) 30 mg 24 hr tablet, Take 30 mg by mouth daily, Disp: , Rfl:   •  rosuvastatin (CRESTOR) 20 MG tablet, Take 1 tablet (20 mg total) by mouth daily, Disp: 90 tablet, Rfl: 3    Current Facility-Administered Medications:   •  cefTRIAXone (ROCEPHIN) injection 1,000 mg, 1,000 mg, Intramuscular, Once, Katie Maria MD      Active Problems     Patient Active Problem List   Diagnosis   • Essential hypertension   • Abnormal glucose   • Anticoagulant long-term use   • CAD in native artery   • ED (erectile dysfunction)   • Hypercholesterolemia   • Hypertriglyceridemia   • Lumbar canal stenosis   • TIA (transient ischemic attack)   • Over weight   • Renal insufficiency   • Supraventricular tachycardia (HCC)   • SOB (shortness of breath)   • Anxiety   • Snoring   • SKYLER (obstructive sleep apnea)   • Central apnea   • Abnormal PSA   • Myalgia   • Elevated liver enzymes   • Stage 3a chronic kidney disease (HCC)         Past Medical History     Past Medical History:   Diagnosis Date   • Elevated liver enzymes    • Hypertension    • TIA (transient ischemic attack)          Surgical History     Past Surgical History:   Procedure Laterality Date   • CARDIAC SURGERY           Family History     Family History   Problem Relation Age of Onset   • Heart failure Father    • Heart failure Mother    • Stroke Brother    • Colon cancer Son    • No Known Problems Daughter    • No Known Problems Daughter          Social History     Social History     Social History     Tobacco Use   Smoking Status Never   Smokeless Tobacco Never         Pertinent Lab Values     Lab Results   Component Value Date    CREATININE 1 21 07/16/2022       Lab Results   Component Value Date    PSA 5 0 (H) 04/30/2022    PSA 3 4 01/05/2022    PSA 2 6 09/05/2021       @RESULTRCNT(1H])@      Pertinent Imaging      - n/a    Portions of the record may have been created with voice recognition software  Occasional wrong word or "sound a like" substitutions may have occurred due to the inherent limitations of voice recognition software  Read the chart carefully and recognize, using context, where substitutions have occurred

## 2022-12-12 ENCOUNTER — TELEPHONE (OUTPATIENT)
Dept: OTHER | Facility: OTHER | Age: 77
End: 2022-12-12

## 2022-12-12 NOTE — TELEPHONE ENCOUNTER
I called and confirmed appt with patient   He preferred a Wednesday so appointment was moved to 12/21 at 11 AM

## 2022-12-12 NOTE — TELEPHONE ENCOUNTER
New DOS noted on Excel spreadsheet for DOS 12/21/22  No further action required as no authorization needed

## 2022-12-12 NOTE — TELEPHONE ENCOUNTER
Call from patient requesting to speak with BRAYAN BURCIAGA regarding scheduling of his procedure  Patient is asking for a call back tomorrow at his work number

## 2022-12-13 ENCOUNTER — PREP FOR PROCEDURE (OUTPATIENT)
Dept: UROLOGY | Facility: AMBULATORY SURGERY CENTER | Age: 77
End: 2022-12-13

## 2022-12-13 DIAGNOSIS — Z01.810 PREOP CARDIOVASCULAR EXAM: ICD-10-CM

## 2022-12-13 DIAGNOSIS — R39.89 SUSPECTED UTI: ICD-10-CM

## 2022-12-13 DIAGNOSIS — R35.1 BENIGN PROSTATIC HYPERPLASIA WITH NOCTURIA: Primary | ICD-10-CM

## 2022-12-13 DIAGNOSIS — N40.1 BENIGN PROSTATIC HYPERPLASIA WITH NOCTURIA: Primary | ICD-10-CM

## 2022-12-13 NOTE — TELEPHONE ENCOUNTER
I spoke with pt this afternoon and scheduled him for his Urolift on 2/10/2023 at the [facility] with Dr Sushma Baugh  I verbally went over all of pt 's pre op instructions and prep information with him  Pt is aware that he needs to have his pre op urine culture and EKG done 2 weeks prior to surgery  Pt is also aware that we are requesting that he holds him Aspirin and Warfarin 1 week prior to surgery and I will be contacting Dr Alka Singh office to confirm that this is ok  Pt will make sure to have a  on the day of surgery  Per pt 's request I will be mailing him a copy of his surgical packet and instructed him to call our office with any questions or concerns regarding this procedure

## 2022-12-13 NOTE — TELEPHONE ENCOUNTER
Pt called and requesting c/b from BRAYAN BURCIAGA for procedure     Pt call back-[phone] till 2:30 if after call [phone]

## 2022-12-16 ENCOUNTER — NURSE TRIAGE (OUTPATIENT)
Dept: OTHER | Facility: OTHER | Age: 77
End: 2022-12-16

## 2022-12-16 NOTE — TELEPHONE ENCOUNTER
Regarding: Upcoming appt's concerns  ----- Message from Charu Whitaker sent at 12/16/2022  4:40 PM EST -----  "I am schedule for a Lupron shot on 12/21/22, what is this shot for? I also have a consult with radiation on on 01/04/23, isn't this too far from my Urolift appt on 02/10/23   I am confused"

## 2022-12-17 NOTE — TELEPHONE ENCOUNTER
Reason for Disposition  • Message left on identified voice mail    Protocols used: NO CONTACT OR DUPLICATE CONTACT CALL-ADULT-

## 2022-12-21 ENCOUNTER — PROCEDURE VISIT (OUTPATIENT)
Dept: UROLOGY | Facility: CLINIC | Age: 77
End: 2022-12-21

## 2022-12-21 DIAGNOSIS — C61 PROSTATE CANCER (HCC): Primary | ICD-10-CM

## 2022-12-21 NOTE — PROGRESS NOTES
12/21/2022  Chris Mack  is a 76 y o  male  [id number]    Diagnosis:      Patient presents for lupron injection managed by Dr Ivonne NIELSEN signed     Plan:  Patient will be scheduled next appt in 6 months  Has Rad Onc appt in January and then urolift scheduled in February  Medication administration:    Educated on common side effects of Lupron  Education given  Medication administered to upper outer R quadrant without issue  Band aid applied  Patient offers no complaints     Administrations This Visit     leuprolide (LUPRON DEPOT 6 MONTH KIT) IM injection kit 45 mg     Admin Date  12/21/2022 Action  Given Dose  45 mg Route  Intramuscular Administered By  Noé Moore RN                There were no vitals filed for this visit        Noé Moore RN

## 2022-12-23 ENCOUNTER — TELEPHONE (OUTPATIENT)
Dept: PULMONOLOGY | Facility: CLINIC | Age: 77
End: 2022-12-23

## 2022-12-23 NOTE — TELEPHONE ENCOUNTER
LM for patient to give a call back to set up a follow up Appt in [place] with Dr Jackie Tadeo in [place] he had canceled

## 2022-12-28 PROBLEM — C61 PROSTATE CANCER (HCC): Status: ACTIVE | Noted: 2022-12-28

## 2023-01-04 ENCOUNTER — CLINICAL SUPPORT (OUTPATIENT)
Dept: RADIATION ONCOLOGY | Facility: HOSPITAL | Age: 78
End: 2023-01-04
Attending: STUDENT IN AN ORGANIZED HEALTH CARE EDUCATION/TRAINING PROGRAM

## 2023-01-04 ENCOUNTER — TELEPHONE (OUTPATIENT)
Dept: RADIATION ONCOLOGY | Facility: HOSPITAL | Age: 78
End: 2023-01-04

## 2023-01-04 VITALS
DIASTOLIC BLOOD PRESSURE: 98 MMHG | OXYGEN SATURATION: 94 % | TEMPERATURE: 97.5 F | RESPIRATION RATE: 18 BRPM | WEIGHT: 190.8 LBS | BODY MASS INDEX: 27.38 KG/M2 | SYSTOLIC BLOOD PRESSURE: 152 MMHG | HEART RATE: 75 BPM

## 2023-01-04 DIAGNOSIS — C61 PROSTATE CANCER (HCC): ICD-10-CM

## 2023-01-04 DIAGNOSIS — C61 PROSTATE CANCER (HCC): Primary | ICD-10-CM

## 2023-01-04 NOTE — PROGRESS NOTES
Jolly oSlis  1945 is a 76 y o  male with recently diagnosed prostate adenocarcinoma  He is referred by Dr Carissa Hernandez and presents today for initial consult  Casandra Baca was noted with a PSA of 3 4 Jan 22'  By Aug 22' PSA was 9 03, with mild urinary symptoms  MRI and biopsy were ordered by urology  Biopsy confirmed  grade group 3 prostate cancer  Treatment options were discussed by urology and patient elected treatment with radiation and ADT  Lupron was administered 12/21/22  Urology also recommended Urolift procedure which is scheduled for 2/10/23  Component PSA, Total   Latest Ref Rng & Units 0 0 - 4 0 ng/mL   7/23/2018 1 6   8/4/2019 1 4   9/5/2021 2 6   1/5/2022 3 4   4/30/2022 5 0 (H)   7/6/2022: PSA 9 03  10/7/2022: PSA 13 60      9/7/22 - Urology - Terrance Davis PA-C  PSA 9 03  Multiparametric MRI & biopsy with repeat PSA  Add Tamsulosin      10/19/22 - MRI prostate multiparametric w wo contrast  IMPRESSION:   1  PI-RADSv2 1 Category 2 - Low (clinically significant cancer is unlikely to be present)  2  Calculated prostate volume of 25 9 cc    3   T1 hyperintensity in the left seminal vesicles, likely representing proteinaceous/hemorrhagic material    Prostate gland boundaries were segmented using 3D advanced post-processing on an independent [employer] system workstation with active physician participation  11/21/22 -Biopsy  A  Prostate, left lateral base:  - Benign prostate tissue  B  Prostate, left lateral mid:  - Benign prostate tissue  C  Prostate, left lateral apex:  - Focal high grade prostatic intraepithelial neoplasia (HGPIN), see comment  Comment: Immunohistochemistry for a prostate multiplex stain (p63, K903, and P504S) demonstrates HGPIN and no evidence of invasive prostatic carcinoma  D  Prostate, left base:  - Focal high grade prostatic intraepithelial neoplasia (HGPIN), see comment    Comment: Immunohistochemistry for a prostate multiplex stain (p63, K903, and P504S) demonstrates HGPIN and no evidence of invasive prostatic carcinoma  E  Prostate, left mid:  - Benign prostate tissue with focal chronic inflammation  F  Prostate, left apex:  -Prostatic adenocarcinoma, Jason score 3 + 3 = 6, Prognostic Grade Group 1,  involving 20% of 1 needle core  and measuring  3 mm in length, see comment  Comment: Immunohistochemistry for a prostate multiplex stain (p63, K903, and P504S) demonstrates presence of invasive prostatic adenocarcinoma  G  Prostate, right lateral base:  -Prostatic adenocarcinoma, Jason score 3 + 4 = 7, Prognostic Grade Group 2,  involving 75% of 1 needle core  and measuring  9 mm in length, see comment  Pattern 4 ~5-10% of the tumor (ill-defined glands)  - Additional Pathologic Findings: HGPIN  Comment: Immunohistochemistry for a prostate multiplex stain (p63, K903, and P504S) demonstrates presence of invasive prostatic adenocarcinoma and HGPIN  H  Prostate, right lateral mid:  - Small atypical acinar proliferation (ASAP) and high grade prostatic intraepithelial neoplasia (HGPIN), see comment  Comment: Immunohistochemistry for a prostate multiplex stain (p63, K903, and P504S) demonstrates HGPIN and no definitive evidence of invasive prostatic carcinoma  I  Prostate, right lateral apex:  - High grade prostatic intraepithelial neoplasia (HGPIN)  J  Prostate, right base:  -Prostatic adenocarcinoma, Walker score 4 + 3 = 7, Prognostic Grade Group 3,  involving 70% of 1 needle core  and measuring  9 mm in length, see comment  Pattern 4 comprised of glomeruloid pattern, ill-defined and fused glands  - Additional Pathologic Findings: HGPIN  Comment: Immunohistochemistry for a prostate multiplex stain (p63, K903, and P504S) demonstrates presence of invasive prostatic adenocarcinoma and HGPIN    K  Prostate, right mid:  -Prostatic adenocarcinoma, Walker score 3 + 4 = 7, Prognostic Grade Group 2,  involving 60% of 1 needle core and measuring  8 mm in length, see comment  Pattern 4 ~10% comprised of ill-defined and fused glands  - Additional Pathologic Findings: HGPIN  L  Prostate, right base:  - High grade prostatic intraepithelial neoplasia (HGPIN)  10/2/22 - Urology - Nicola Rojo MD  Patient with newly diagnosed grade group 3 prostate cancer  Discussed treatment options - patient has elected for external beam radiation therapy and deprivation therapy  Believe patient would benefit from a UroLift optimize his bladder outlet prior to RT  Refer to rad/onc, patient to return for Lupron administration in the near future      12/21/22  - Lupron injection administered      Upcoming Appointments:  2/10/23 - Urolift prcmagui - Ulysses Wolfe  3/10/23 - Urology - Pypiuk          Oncology History   Prostate cancer [facility])   2022 Initial Diagnosis    Prostate cancer ([facility] [address] )     11/21/2022 Biopsy    A  Prostate, left lateral base:  - Benign prostate tissue  B  Prostate, left lateral mid:  - Benign prostate tissue  C  Prostate, left lateral apex:  - Focal high grade prostatic intraepithelial neoplasia (HGPIN), see comment  Comment: Immunohistochemistry for a prostate multiplex stain (p63, K903, and P504S) demonstrates HGPIN and no evidence of invasive prostatic carcinoma  D  Prostate, left base:  - Focal high grade prostatic intraepithelial neoplasia (HGPIN), see comment  Comment: Immunohistochemistry for a prostate multiplex stain (p63, K903, and P504S) demonstrates HGPIN and no evidence of invasive prostatic carcinoma  E  Prostate, left mid:  - Benign prostate tissue with focal chronic inflammation  F  Prostate, left apex:  -Prostatic adenocarcinoma, Jason score 3 + 3 = 6, Prognostic Grade Group 1,  involving 20% of 1 needle core  and measuring  3 mm in length, see comment       Comment: Immunohistochemistry for a prostate multiplex stain (p63, K903, and P504S) demonstrates presence of invasive prostatic adenocarcinoma  G  Prostate, right lateral base:  -Prostatic adenocarcinoma, Camas score 3 + 4 = 7, Prognostic Grade Group 2,  involving 75% of 1 needle core  and measuring  9 mm in length, see comment  Pattern 4 ~5-10% of the tumor (ill-defined glands)  - Additional Pathologic Findings: HGPIN     Comment: Immunohistochemistry for a prostate multiplex stain (p63, K903, and P504S) demonstrates presence of invasive prostatic adenocarcinoma and HGPIN  H  Prostate, right lateral mid:  - Small atypical acinar proliferation (ASAP) and high grade prostatic intraepithelial neoplasia (HGPIN), see comment  Comment: Immunohistochemistry for a prostate multiplex stain (p63, K903, and P504S) demonstrates HGPIN and no definitive evidence of invasive prostatic carcinoma  I  Prostate, right lateral apex:  - High grade prostatic intraepithelial neoplasia (HGPIN)  J  Prostate, right base:  -Prostatic adenocarcinoma, Jason score 4 + 3 = 7, Prognostic Grade Group 3,  involving 70% of 1 needle core  and measuring  9 mm in length, see comment  Pattern 4 comprised of glomeruloid pattern, ill-defined and fused glands  - Additional Pathologic Findings: HGPIN     Comment: Immunohistochemistry for a prostate multiplex stain (p63, K903, and P504S) demonstrates presence of invasive prostatic adenocarcinoma and HGPIN  K  Prostate, right mid:  -Prostatic adenocarcinoma, Camas score 3 + 4 = 7, Prognostic Grade Group 2,  involving 60% of 1 needle core  and measuring  8 mm in length, see comment  Pattern 4 ~10% comprised of ill-defined and fused glands  - Additional Pathologic Findings: HGPIN     L  Prostate, right base:  - High grade prostatic intraepithelial neoplasia (HGPIN)  12/21/2022 -  Hormone Therapy    Lupron injection          Review of Systems:  Review of Systems   Constitutional: Negative  HENT: Negative  Eyes: Negative      Respiratory: Negative  Cardiovascular: Negative  Gastrointestinal: Negative  Endocrine: Negative  Genitourinary: Positive for frequency (nocturia x3) and urgency  Slow stream, taking flomax daily for the last few months   Musculoskeletal: Positive for arthralgias  Skin: Negative  Allergic/Immunologic: Negative  Neurological: Negative  Hematological: Negative  Psychiatric/Behavioral: Negative  Clinical Trial: no    IPSS Questionnaire (AUA-7): Over the past month…    1)  How often have you had a sensation of not emptying your bladder completely after you finish urinating? 2 - Less than half the time   2)  How often have you had to urinate again less than two hours after you finished urinating? 3 - About half the time   3)  How often have you found you stopped and started again several times when you urinated? 0 - Not at all   4) How difficult have you found it to postpone urination? 2 - Less than half the time   5) How often have you had a weak urinary stream?  5 - Almost always   6) How often have you had to push or strain to begin urination? 0 - Not at all   7) How many times did you most typically get up to urinate from the time you went to bed until the time you got up in the morning?   3 - 3 times   Total Score:  15       Pain assessment: 0    PFT: n/a    Prior Radiation: no    Teaching; NCI radiation packet, prostate    MST: completed    Implantable Devices (Port, pacemaker, pain stimulator): no    Hip Replacement: no      Health Maintenance   Topic Date Due   • Hepatitis B Vaccine (1 of 3 - 3-dose series) Never done   • COVID-19 Vaccine (4 - Booster for Moderna series) 12/23/2021   • Influenza Vaccine (1) 09/01/2022   • BMI: Followup Plan  05/17/2023   • Fall Risk  05/17/2023   • Medicare Annual Wellness Visit (AWV)  05/17/2023   • BMI: Adult  12/08/2023   • Depression Screening  01/04/2024   • Hepatitis C Screening  Completed   • Pneumococcal Vaccine: 65+ Years  Completed   • HIB Vaccine  Aged Out   • IPV Vaccine  Aged Out   • Hepatitis A Vaccine  Aged Out   • Meningococcal ACWY Vaccine  Aged Out   • HPV Vaccine  Aged Out       Past Medical History:   Diagnosis Date   • Elevated liver enzymes    • Hypertension    • Prostate cancer ([facility] [address] )    • TIA (transient ischemic attack)        Past Surgical History:   Procedure Laterality Date   • CARDIAC SURGERY         Family History   Problem Relation Age of Onset   • Heart failure Father    • Heart failure Mother    • Stroke Brother    • Colon cancer Son    • No Known Problems Daughter    • No Known Problems Daughter        Social History     Tobacco Use   • Smoking status: Never   • Smokeless tobacco: Never   Vaping Use   • Vaping Use: Never used   Substance Use Topics   • Alcohol use: Never   • Drug use: Never          Current Outpatient Medications:   •  aspirin 81 MG tablet, Take 81 mg by mouth, Disp: , Rfl:   •  candesartan (ATACAND) 16 mg tablet, , Disp: , Rfl:   •  diltiazem (CARDIZEM CD) 120 mg 24 hr capsule, Take 120 mg by mouth, Disp: , Rfl:   •  escitalopram (LEXAPRO) 10 mg tablet, TAKE 1 TABLET BY MOUTH  DAILY, Disp: 90 tablet, Rfl: 2  •  Multiple Vitamins-Minerals (CENTRUM SILVER PO), Take by mouth, Disp: , Rfl:   •  Nexlizet 180-10 MG TABS, , Disp: , Rfl:   •  Omega-3 Fatty Acids (FISH OIL) 1200 MG CAPS, Take by mouth, Disp: , Rfl:   •  tamsulosin (FLOMAX) 0 4 mg, Take 1 capsule (0 4 mg total) by mouth daily with dinner, Disp: 90 capsule, Rfl: 3  •  terazosin (HYTRIN) 2 mg capsule, , Disp: , Rfl:   •  warfarin (COUMADIN) 5 mg tablet, Take 5 mg by mouth, Disp: , Rfl:   •  isosorbide mononitrate (IMDUR) 30 mg 24 hr tablet, Take 30 mg by mouth daily, Disp: , Rfl:   •  nitroglycerin (NITROSTAT) 0 4 mg SL tablet, Place 1 tablet under the tongue every 5 (five) minutes as needed, Disp: , Rfl:     Current Facility-Administered Medications:   •  cefTRIAXone (ROCEPHIN) injection 1,000 mg, 1,000 mg, Intramuscular, Once, Mark Cristobal MD    No Known Allergies     Vitals:    01/04/23 0940   BP: 152/98   BP Location: Left arm   Pulse: 75   Resp: 18   Temp: 97 5 °F (36 4 °C)   TempSrc: Temporal   SpO2: 94%   Weight: 86 5 kg (190 lb 12 8 oz)

## 2023-01-04 NOTE — TELEPHONE ENCOUNTER
Phyllis! Dr Katalina Espitia consulted with Heath Blank today at [facility], he already received ADT/Lupron on 12/21/22 and scheduled for Urolift procedure on 2/10/23  Dr Katalina Espitia is recommending evaluation for fiducial markers/SpaceOAR if possible given plan for urolift       thanks

## 2023-01-04 NOTE — PROGRESS NOTES
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 (cT1c, PSA 13 6, GS 4+3) prostate adenocarcinoma  He is seen today in consideration of definitive RT  We reviewed the patient's recent clinical history including his recent MRI prostate and pathology results  Treatment options for unfavorable intermediate risk prostate cancer would include radical prostatectomy, external beam RT with ADT, combination external beam RT and brachytherapy +/- ADT, and in select patients SBRT  Given his age he was not recommended for RP  With regard to definitive RT, I would not recommend either brachytherapy or SBRT owing to his history of TURP and his significant LUTS, respectively  If he were to proceed with RT, I would likely recommend moderate hypofractionated RT (7000cGy/28 fx)  If he had persistent severe LUTS following urolift I would likely consider conventionally fractionated RT (7920cGy/44fx)  We discussed the acute and late toxicities of prostate RT  Acute toxicities include, but are not limited to, increased urinary frequency, dysuria, urinary urgency, nocturia; increased frequency of bowel movements, diarrhea; fatigue; and radiation related skin changes  Late toxicities include, but are not limited to, radiation cystitis, radiation proctitis, decreased erectile function  Rarely patients may develop serious late consequences from RT including fistula formation of secondary cancers  The patient was given the opportunity to ask questions, all of which were answered to his satisfaction       The patient has started ADT and is planned for urolift in 2/2023  I will plan to reach out to Dr Quay Brunner to see if he is a candidate for spaceOAR/fiducials after this procedure  After healing, he will return for CT simulation followed by delivery of RT  Summary  - Recommend either moderately hypofractionated RT or conventionally fractionated RT, depending on severity of symptoms following urolift  - Consider spaceOAR and fiducials  - Will need at least 6 months of ADT  History of Present Illness   Mr Velvet Seip is a 55-year-old man with a history of longstanding lower urinary tract symptoms status post TURP approximately 15 years ago who was more recently in his usual state of health until he was noted to have an elevated PSA to 9 03 (7/6/2022)  Prior PSA had been 5 0 (4/30/2022) and 3 4 (1/5/2020)  Repeat PSA showed further increase up to 13 6 (10/7/2022) and he underwent MRI prostate which demonstrated a 26 cc gland without any significant intraprostatic lesions noted  Biopsy (11/21/2022) demonstrated prostate adenocarcinoma, Sharon score 4+3 involving 70% of 1/12 cores, Sharon score 3+4 involving up to 75% of 2/12 cores, and Jason score 3+3 involving 20% of 1/12 cores (4/12 cores in total)  Multiple additional cores were present with high-grade intra epithelial neoplasm  The patient was seen by Dr Quay Brunner and referred for planned definitive external beam radiation therapy  He was started on ADT (12/21/2022) and UroLift procedure is planned to optimize his bladder outlet in 2/2023  Currently the patient is doing well overall  He remains quite active despite his age and 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)   2022 Initial Diagnosis    Prostate cancer ([facility] [address] )     11/21/2022 Biopsy    A  Prostate, left lateral base:  - Benign prostate tissue  B  Prostate, left lateral mid:  - Benign prostate tissue  C  Prostate, left lateral apex:  - Focal high grade prostatic intraepithelial neoplasia (HGPIN), see comment  Comment: Immunohistochemistry for a prostate multiplex stain (p63, K903, and P504S) demonstrates HGPIN and no evidence of invasive prostatic carcinoma  D  Prostate, left base:  - Focal high grade prostatic intraepithelial neoplasia (HGPIN), see comment  Comment: Immunohistochemistry for a prostate multiplex stain (p63, K903, and P504S) demonstrates HGPIN and no evidence of invasive prostatic carcinoma  E  Prostate, left mid:  - Benign prostate tissue with focal chronic inflammation  F  Prostate, left apex:  -Prostatic adenocarcinoma, [place] score 3 + 3 = 6, Prognostic Grade Group 1,  involving 20% of 1 needle core  and measuring  3 mm in length, see comment  Comment: Immunohistochemistry for a prostate multiplex stain (p63, K903, and P504S) demonstrates presence of invasive prostatic adenocarcinoma  G  Prostate, right lateral base:  -Prostatic adenocarcinoma, [place] score 3 + 4 = 7, Prognostic Grade Group 2,  involving 75% of 1 needle core  and measuring  9 mm in length, see comment  Pattern 4 ~5-10% of the tumor (ill-defined glands)  - Additional Pathologic Findings: HGPIN     Comment: Immunohistochemistry for a prostate multiplex stain (p63, K903, and P504S) demonstrates presence of invasive prostatic adenocarcinoma and HGPIN          H  Prostate, right lateral mid:  - Small atypical acinar proliferation (ASAP) and high grade prostatic intraepithelial neoplasia (HGPIN), see comment  Comment: Immunohistochemistry for a prostate multiplex stain (p63, K903, and P504S) demonstrates HGPIN and no definitive evidence of invasive prostatic carcinoma  I  Prostate, right lateral apex:  - High grade prostatic intraepithelial neoplasia (HGPIN)  J  Prostate, right base:  -Prostatic adenocarcinoma, Jason score 4 + 3 = 7, Prognostic Grade Group 3,  involving 70% of 1 needle core  and measuring  9 mm in length, see comment  Pattern 4 comprised of glomeruloid pattern, ill-defined and fused glands  - Additional Pathologic Findings: HGPIN     Comment: Immunohistochemistry for a prostate multiplex stain (p63, K903, and P504S) demonstrates presence of invasive prostatic adenocarcinoma and HGPIN  K  Prostate, right mid:  -Prostatic adenocarcinoma, [place] score 3 + 4 = 7, Prognostic Grade Group 2,  involving 60% of 1 needle core  and measuring  8 mm in length, see comment  Pattern 4 ~10% comprised of ill-defined and fused glands  - Additional Pathologic Findings: HGPIN     L  Prostate, right base:  - High grade prostatic intraepithelial neoplasia (HGPIN)       12/21/2022 -  Hormone Therapy    Lupron injection            Past Medical History:   Diagnosis Date   • Elevated liver enzymes    • Hypertension    • Prostate cancer ([facility] [address] )    • TIA (transient ischemic attack)      Past Surgical History:   Procedure Laterality Date   • CARDIAC SURGERY         Family History   Problem Relation Age of Onset   • Heart failure Father    • Heart failure Mother    • Stroke Brother    • Colon cancer Son    • No Known Problems Daughter    • No Known Problems Daughter        Social History   Social History     Substance and Sexual Activity   Alcohol Use Never     Social History     Substance and Sexual Activity   Drug Use Never     Social History     Tobacco Use   Smoking Status Never   Smokeless Tobacco Never         Meds/Allergies     Current Outpatient Medications:   •  aspirin 81 MG tablet, Take 81 mg by mouth, Disp: , Rfl:   •  candesartan (ATACAND) 16 mg tablet, , Disp: , Rfl:   •  diltiazem (CARDIZEM CD) 120 mg 24 hr capsule, Take 120 mg by mouth, Disp: , Rfl:   •  escitalopram (LEXAPRO) 10 mg tablet, TAKE 1 TABLET BY MOUTH  DAILY, Disp: 90 tablet, Rfl: 2  •  Multiple Vitamins-Minerals (CENTRUM SILVER PO), Take by mouth, Disp: , Rfl:   •  Nexlizet 180-10 MG TABS, , Disp: , Rfl:   •  Omega-3 Fatty Acids (FISH OIL) 1200 MG CAPS, Take by mouth, Disp: , Rfl:   •  tamsulosin (FLOMAX) 0 4 mg, Take 1 capsule (0 4 mg total) by mouth daily with dinner, Disp: 90 capsule, Rfl: 3  •  terazosin (HYTRIN) 2 mg capsule, , Disp: , Rfl:   •  warfarin (COUMADIN) 5 mg tablet, Take 5 mg by mouth, Disp: , Rfl:   •  isosorbide mononitrate (IMDUR) 30 mg 24 hr tablet, Take 30 mg by mouth daily, Disp: , Rfl:   •  nitroglycerin (NITROSTAT) 0 4 mg SL tablet, Place 1 tablet under the tongue every 5 (five) minutes as needed, Disp: , Rfl:     Current Facility-Administered Medications:   •  cefTRIAXone (ROCEPHIN) injection 1,000 mg, 1,000 mg, Intramuscular, Once, Ayad Salas MD  No Known Allergies    Review of Systems   Constitutional: Negative  HENT: Negative  Eyes: Negative  Respiratory: Negative  Cardiovascular: Negative  Gastrointestinal: Negative  Endocrine: Negative  Genitourinary: Positive for frequency (nocturia x3) and urgency  Slow stream, taking flomax daily for the last few months   Musculoskeletal: Positive for arthralgias  Skin: Negative  Allergic/Immunologic: Negative  Neurological: Negative  Hematological: Negative  Psychiatric/Behavioral: Negative           Clinical Trial: no     IPSS Questionnaire (AUA-7): Over the past month…     1)  How often have you had a sensation of not emptying your bladder completely after you finish urinating?   2 - Less than half the time   2)  How often have you had to urinate again less than two hours after you finished urinating? 3 - About half the time   3)  How often have you found you stopped and started again several times when you urinated? 0 - Not at all   4) How difficult have you found it to postpone urination? 2 - Less than half the time   5) How often have you had a weak urinary stream?  5 - Almost always   6) How often have you had to push or strain to begin urination? 0 - Not at all   7) How many times did you most typically get up to urinate from the time you went to bed until the time you got up in the morning? 3 - 3 times   Total Score:  15          OBJECTIVE:   /98 (BP Location: Left arm)   Pulse 75   Temp 97 5 °F (36 4 °C) (Temporal)   Resp 18   Wt 86 5 kg (190 lb 12 8 oz)   SpO2 94%   BMI 27 38 kg/m²   Pain Assessment:  0  Performance Status: ECOG/Zubrod/WHO: 1 - Symptomatic but completely ambulatory    Physical Exam   Well-appearing  NAD  No increased work of breathing  Extremities warm well perfused  VINOD deferred at today's visit  RESULTS  Lab Results    Chemistry        Component Value Date/Time     (H) 09/26/2015 0814    K 3 8 07/16/2022 0922    K 3 9 09/26/2015 0814     (H) 07/16/2022 0922     (H) 09/26/2015 0814    CO2 29 07/16/2022 0922    CO2 25 12/18/2019 1800    BUN 18 07/16/2022 0922    BUN 17 09/26/2015 0814    CREATININE 1 21 07/16/2022 0922    CREATININE 0 99 09/26/2015 0814        Component Value Date/Time    CALCIUM 9 5 07/16/2022 0922    CALCIUM 9 1 09/26/2015 0814    ALKPHOS 78 07/16/2022 0922    ALKPHOS 106 08/24/2014 0827    AST 27 07/16/2022 0922    AST 26 04/19/2015 1017    ALT 35 07/16/2022 0922    ALT 31 04/19/2015 1017    BILITOT 0 53 08/24/2014 0827            Lab Results   Component Value Date    WBC 6 61 07/16/2022    HGB 15 8 07/16/2022    HCT 48 0 07/16/2022    MCV 98 07/16/2022     07/16/2022         Imaging Studies  No results found  Pathology:   Final Diagnosis   A  Prostate, left lateral base:  - Benign prostate tissue     B  Prostate, left lateral mid:  - Benign prostate tissue       C  Prostate, left lateral apex:  - Focal high grade prostatic intraepithelial neoplasia (HGPIN), see comment      Comment: Immunohistochemistry for a prostate multiplex stain (p63, K903, and P504S) demonstrates HGPIN and no evidence of invasive prostatic carcinoma      D  Prostate, left base:  - Focal high grade prostatic intraepithelial neoplasia (HGPIN), see comment      Comment: Immunohistochemistry for a prostate multiplex stain (p63, K903, and P504S) demonstrates HGPIN and no evidence of invasive prostatic carcinoma      E  Prostate, left mid:  - Benign prostate tissue with focal chronic inflammation       F  Prostate, left apex:  -Prostatic adenocarcinoma, [place] score 3 + 3 = 6, Prognostic Grade Group 1,  involving 20% of 1 needle core  and measuring  3 mm in length, see comment      Comment: Immunohistochemistry for a prostate multiplex stain (p63, K903, and P504S) demonstrates presence of invasive prostatic adenocarcinoma      G  Prostate, right lateral base:  -Prostatic adenocarcinoma, Jason score 3 + 4 = 7, Prognostic Grade Group 2,  involving 75% of 1 needle core  and measuring  9 mm in length, see comment  Pattern 4 ~5-10% of the tumor (ill-defined glands)  - Additional Pathologic Findings: HGPIN     Comment: Immunohistochemistry for a prostate multiplex stain (p63, K903, and P504S) demonstrates presence of invasive prostatic adenocarcinoma and HGPIN        H  Prostate, right lateral mid:  - Small atypical acinar proliferation (ASAP) and high grade prostatic intraepithelial neoplasia (HGPIN), see comment      Comment: Immunohistochemistry for a prostate multiplex stain (p63, K903, and P504S) demonstrates HGPIN and no definitive evidence of invasive prostatic carcinoma      I   Prostate, right lateral apex:  - High grade prostatic intraepithelial neoplasia (HGPIN)      J  Prostate, right base:  -Prostatic adenocarcinoma, Jason score 4 + 3 = 7, Prognostic Grade Group 3,  involving 70% of 1 needle core  and measuring  9 mm in length, see comment  Pattern 4 comprised of glomeruloid pattern, ill-defined and fused glands  - Additional Pathologic Findings: HGPIN     Comment: Immunohistochemistry for a prostate multiplex stain (p63, K903, and P504S) demonstrates presence of invasive prostatic adenocarcinoma and HGPIN      K  Prostate, right mid:  -Prostatic adenocarcinoma, Jason score 3 + 4 = 7, Prognostic Grade Group 2,  involving 60% of 1 needle core  and measuring  8 mm in length, see comment  Pattern 4 ~10% comprised of ill-defined and fused glands  - Additional Pathologic Findings: HGPIN     L  Prostate, right base:  - High grade prostatic intraepithelial neoplasia (HGPIN)  ASSESSMENT  1  Prostate cancer [facility])  Ambulatory Referral to Radiation Oncology    Radiation Simulation Treatment        Cancer Staging   No matching staging information was found for the patient  PLAN/DISCUSSION  Orders Placed This Encounter   Procedures   • Radiation Simulation Treatment        Caryn Delgadillo MD  1/4/2023,1:30 PM    Total time spent reviewing EMR, seeing patient in consultation, documenting visit, placing treatment orders, and communicating with other medical providers: 49 minutes  Portions of the record may have been created with voice recognition software  Occasional wrong word or "sound a like" substitutions may have occurred due to the inherent limitations of voice recognition software  Read the chart carefully and recognize, using context, where substitutions have occurred

## 2023-01-11 ENCOUNTER — PATIENT OUTREACH (OUTPATIENT)
Dept: HEMATOLOGY ONCOLOGY | Facility: CLINIC | Age: 78
End: 2023-01-11

## 2023-01-11 NOTE — PROGRESS NOTES
Outreach made to patient  I explained my role and introduced myself  I mentioned that I had some questions for him including a question I had from Dr Mari Steve pertaining to his SpaceOAR/Marker procedure as well as his Urolift procedure  I gave patient my direct line to return my call  I will follow up in the near future if I do not hear back

## 2023-01-12 ENCOUNTER — PATIENT OUTREACH (OUTPATIENT)
Dept: HEMATOLOGY ONCOLOGY | Facility: CLINIC | Age: 78
End: 2023-01-12

## 2023-01-12 DIAGNOSIS — C61 PROSTATE CANCER (HCC): Primary | ICD-10-CM

## 2023-01-12 NOTE — PROGRESS NOTES
Patient returned my call and I introduced myself further  We went over  general assessment and went over a few questions he had  Patient stated that he will call back if he would like to proceed with genetic testing, he will speak to his children about this  Patient is not sure on how he wants to proceed with his prostate cancer dx  He is not sure if he wants radiation, prostatectomy, and or active surveillance  He would like to speak to Dr Chris Donald one more time to make his final decision on his treatment plan  Message will be sent to Urology office to schedule a visit with Dr Chris Donald to discuss his treatment options again  Patient is requesting that office calls daughter [facility] to schedule this appt  Patient is also concerned stating that Dr Serjio Lemus was to call his daughter [place] [facility]  [facility] to discuss their discussion from the consult  Message will also be sent to Rad Onc RN to get message to Dr Serjio Lemus to returned patients daughters call  [facility]- [phone]    I will reach back out to patient later in his treatment timeline if he is going to pursue with radiation

## 2023-01-18 ENCOUNTER — PATIENT OUTREACH (OUTPATIENT)
Dept: CASE MANAGEMENT | Facility: HOSPITAL | Age: 78
End: 2023-01-18

## 2023-01-18 PROBLEM — R35.1 BENIGN PROSTATIC HYPERPLASIA WITH NOCTURIA: Status: ACTIVE | Noted: 2023-01-18

## 2023-01-18 PROBLEM — N40.1 BENIGN PROSTATIC HYPERPLASIA WITH NOCTURIA: Status: ACTIVE | Noted: 2023-01-18

## 2023-01-18 NOTE — H&P (VIEW-ONLY)
Referring Physician: Monica Venegas MD  A copy of this note was sent to the referring physician  Diagnoses and all orders for this visit:    Prostate cancer ([address] )    Benign prostatic hyperplasia with nocturia            Assessment and plan:       1  Newly diagnosed grade group 3 prostate cancer  -13 0  -negative multiparametric MRI  -Grade group 3  - cT2a  -Lupron x1 administered in anticipation of radiation therapy    3  BPH w severe LUTS  -History of TURP  -Likely can benefit from UroLift given low volume 25 g gland recommended prior to radiation    2 comorbidities: Obstructive sleep apnea, coronary artery disease, supraventricular tachycardia, transient ischemic attack, chronic anticoagulation    I had an excellent discussion with Gentry Black and his dariana daughter today  We discussed his overall risk stratification for his prostate cancer, and placed this in the context of his age and comorbidities particularly his cardiac comorbidities  Given all of these factors, the patient has elected to defer any definitive local treatment for his prostate cancer for now and participate in active surveillance  He has been counseled on the option of radiation by Dr Maya Bradley, but has elected for observation  I do think this is reasonable given the above  He would like to move forward with the UroLift as currently scheduled for his medically refractory lower urinary tract symptoms  We discussed the anticipated recovery time, risks, and the possibility that his storage symptoms will not be significantly improved with this procedure  He is comfortable moving forward as is his daughter  Comprehensive plan is as follows:  - Patient has elected to cancel radiation and proceed with active surveillance  I have notified our nurse navigator  - We will proceed with UroLift as currently scheduled    We will monitor his PSA every 4 months and plan to repeat an MRI next fall    Khadar Anaya MD      Chief Complaint     follow-up prostate cancer, BPH      History of Present Illness     De Guillory  is a 76 y o  male returns in follow-up  Detailed Urologic History     - please refer to HPI    Review of Systems     Review of Systems   Constitutional: Negative for activity change and fatigue  HENT: Negative for congestion  Eyes: Negative for visual disturbance  Respiratory: Negative for shortness of breath and wheezing  Cardiovascular: Negative for chest pain and leg swelling  Gastrointestinal: Negative for abdominal pain  Endocrine: Negative for polyuria  Genitourinary: Negative for dysuria, flank pain, hematuria and urgency  Musculoskeletal: Negative for back pain  Allergic/Immunologic: Negative for immunocompromised state  Neurological: Negative for dizziness and numbness  Psychiatric/Behavioral: Negative for dysphoric mood  All other systems reviewed and are negative      AUA SYMPTOM SCORE    Flowsheet Row Most Recent Value   AUA SYMPTOM SCORE    How often have you had a sensation of not emptying your bladder completely after you finished urinating? 3 (P)     How often have you had to urinate again less than two hours after you finished urinating? 3 (P)     How often have you found you stopped and started again several times when you urinate? 2 (P)     How often have you found it difficult to postpone urination? 3 (P)     How often have you had a weak urinary stream? 3 (P)     How often have you had to push or strain to begin urination? 2 (P)     How many times did you most typically get up to urinate from the time you went to bed at night until the time you got up in the morning? 2 (P)     Quality of Life: If you were to spend the rest of your life with your urinary condition just the way it is now, how would you feel about that? 3 (P)     AUA SYMPTOM SCORE 18 (P)             Allergies     No Known Allergies    Physical Exam       Physical Exam  Constitutional:       General: He is not in acute distress  Appearance: He is well-developed  HENT:      Head: Normocephalic and atraumatic  Cardiovascular:      Comments: Negative lower extremity edema  Pulmonary:      Effort: Pulmonary effort is normal       Breath sounds: Normal breath sounds  Abdominal:      Palpations: Abdomen is soft  Musculoskeletal:         General: Normal range of motion  Cervical back: Normal range of motion  Skin:     General: Skin is warm  Neurological:      Mental Status: He is alert and oriented to person, place, and time  Psychiatric:         Behavior: Behavior normal      Right side is firm, possibly consistent with T3 exam       Vital Signs  There were no vitals filed for this visit        Current Medications       Current Outpatient Medications:   •  aspirin 81 MG tablet, Take 81 mg by mouth, Disp: , Rfl:   •  candesartan (ATACAND) 16 mg tablet, , Disp: , Rfl:   •  diltiazem (CARDIZEM CD) 120 mg 24 hr capsule, Take 120 mg by mouth, Disp: , Rfl:   •  escitalopram (LEXAPRO) 10 mg tablet, TAKE 1 TABLET BY MOUTH  DAILY, Disp: 90 tablet, Rfl: 2  •  isosorbide mononitrate (IMDUR) 30 mg 24 hr tablet, Take 30 mg by mouth daily, Disp: , Rfl:   •  Multiple Vitamins-Minerals (CENTRUM SILVER PO), Take by mouth, Disp: , Rfl:   •  Nexlizet 180-10 MG TABS, , Disp: , Rfl:   •  nitroglycerin (NITROSTAT) 0 4 mg SL tablet, Place 1 tablet under the tongue every 5 (five) minutes as needed, Disp: , Rfl:   •  Omega-3 Fatty Acids (FISH OIL) 1200 MG CAPS, Take by mouth, Disp: , Rfl:   •  tamsulosin (FLOMAX) 0 4 mg, Take 1 capsule (0 4 mg total) by mouth daily with dinner, Disp: 90 capsule, Rfl: 3  •  terazosin (HYTRIN) 2 mg capsule, , Disp: , Rfl:   •  warfarin (COUMADIN) 5 mg tablet, Take 5 mg by mouth, Disp: , Rfl:     Current Facility-Administered Medications:   •  cefTRIAXone (ROCEPHIN) injection 1,000 mg, 1,000 mg, Intramuscular, Once, Aris Philippe MD      Active Problems     Patient Active Problem List   Diagnosis   • Essential hypertension   • Abnormal glucose   • Anticoagulant long-term use   • CAD in native artery   • ED (erectile dysfunction)   • Hypercholesterolemia   • Hypertriglyceridemia   • Lumbar canal stenosis   • TIA (transient ischemic attack)   • Over weight   • Renal insufficiency   • Supraventricular tachycardia (HCC)   • SOB (shortness of breath)   • Anxiety   • Snoring   • SKYLER (obstructive sleep apnea)   • Central apnea   • Abnormal PSA   • Myalgia   • Elevated liver enzymes   • Stage 3a chronic kidney disease (HCC)   • Prostate cancer (HCC)   • Benign prostatic hyperplasia with nocturia         Past Medical History     Past Medical History:   Diagnosis Date   • Elevated liver enzymes    • Hypertension    • Pro[address] ([facility] [address] )    • TIA (transient ischemic attack)          Surgical History     Past Surgical History:   Procedure Laterality Date   • CARDIAC SURGERY           Family History     Family History   Problem Relation Age of Onset   • Heart failure Father    • Heart failure Mother    • Stroke Brother    • Colon cancer Son    • No Known Problems Daughter    • No Known Problems Daughter          Social History     Social History     Social History     Tobacco Use   Smoking Status Never   Smokeless Tobacco Never         Pertinent Lab Values     Lab Results   Component Value Date    CREATININE 1 21 07/16/2022       Lab Results   Component Value Date    PSA 5 0 (H) 04/30/2022    PSA 3 4 01/05/2022    PSA 2 6 09/05/2021       @RESULTRCNT(1H])@      Pertinent Imaging      - n/a    Portions of the record may have been created with voice recognition software  Occasional wrong word or "sound a like" substitutions may have occurred due to the inherent limitations of voice recognition software  Read the chart carefully and recognize, using context, where substitutions have occurred

## 2023-01-18 NOTE — PROGRESS NOTES
Referring Physician: Maya Corbett MD  A copy of this note was sent to the referring physician  Diagnoses and all orders for this visit:    Prostate cancer ([address] )    Benign prostatic hyperplasia with nocturia            Assessment and plan:       1  Newly diagnosed grade group 3 prostate cancer  -13 0  -negative multiparametric MRI  -Grade group 3  - cT2a  -Lupron x1 administered in anticipation of radiation therapy    3  BPH w severe LUTS  -History of TURP  -Likely can benefit from UroLift given low volume 25 g gland recommended prior to radiation    2 comorbidities: Obstructive sleep apnea, coronary artery disease, supraventricular tachycardia, transient ischemic attack, chronic anticoagulation    I had an excellent discussion with Laureano Bunch and his dariana daughter today  We discussed his overall risk stratification for his prostate cancer, and placed this in the context of his age and comorbidities particularly his cardiac comorbidities  Given all of these factors, the patient has elected to defer any definitive local treatment for his prostate cancer for now and participate in active surveillance  He has been counseled on the option of radiation by Dr Brandi Jeronimo, but has elected for observation  I do think this is reasonable given the above  He would like to move forward with the UroLift as currently scheduled for his medically refractory lower urinary tract symptoms  We discussed the anticipated recovery time, risks, and the possibility that his storage symptoms will not be significantly improved with this procedure  He is comfortable moving forward as is his daughter  Comprehensive plan is as follows:  - Patient has elected to cancel radiation and proceed with active surveillance  I have notified our nurse navigator  - We will proceed with UroLift as currently scheduled    We will monitor his PSA every 4 months and plan to repeat an MRI next fall    Tanisha Coburn MD      Chief Complaint     follow-up prostate cancer, BPH      History of Present Illness     Kannan Tran  is a 76 y o  male returns in follow-up  Detailed Urologic History     - please refer to HPI    Review of Systems     Review of Systems   Constitutional: Negative for activity change and fatigue  HENT: Negative for congestion  Eyes: Negative for visual disturbance  Respiratory: Negative for shortness of breath and wheezing  Cardiovascular: Negative for chest pain and leg swelling  Gastrointestinal: Negative for abdominal pain  Endocrine: Negative for polyuria  Genitourinary: Negative for dysuria, flank pain, hematuria and urgency  Musculoskeletal: Negative for back pain  Allergic/Immunologic: Negative for immunocompromised state  Neurological: Negative for dizziness and numbness  Psychiatric/Behavioral: Negative for dysphoric mood  All other systems reviewed and are negative      AUA SYMPTOM SCORE    Flowsheet Row Most Recent Value   AUA SYMPTOM SCORE    How often have you had a sensation of not emptying your bladder completely after you finished urinating? 3 (P)     How often have you had to urinate again less than two hours after you finished urinating? 3 (P)     How often have you found you stopped and started again several times when you urinate? 2 (P)     How often have you found it difficult to postpone urination? 3 (P)     How often have you had a weak urinary stream? 3 (P)     How often have you had to push or strain to begin urination? 2 (P)     How many times did you most typically get up to urinate from the time you went to bed at night until the time you got up in the morning? 2 (P)     Quality of Life: If you were to spend the rest of your life with your urinary condition just the way it is now, how would you feel about that? 3 (P)     AUA SYMPTOM SCORE 18 (P)             Allergies     No Known Allergies    Physical Exam       Physical Exam  Constitutional:       General: He is not in acute distress  Appearance: He is well-developed  HENT:      Head: Normocephalic and atraumatic  Cardiovascular:      Comments: Negative lower extremity edema  Pulmonary:      Effort: Pulmonary effort is normal       Breath sounds: Normal breath sounds  Abdominal:      Palpations: Abdomen is soft  Musculoskeletal:         General: Normal range of motion  Cervical back: Normal range of motion  Skin:     General: Skin is warm  Neurological:      Mental Status: He is alert and oriented to person, place, and time  Psychiatric:         Behavior: Behavior normal      Right side is firm, possibly consistent with T3 exam       Vital Signs  There were no vitals filed for this visit        Current Medications       Current Outpatient Medications:   •  aspirin 81 MG tablet, Take 81 mg by mouth, Disp: , Rfl:   •  candesartan (ATACAND) 16 mg tablet, , Disp: , Rfl:   •  diltiazem (CARDIZEM CD) 120 mg 24 hr capsule, Take 120 mg by mouth, Disp: , Rfl:   •  escitalopram (LEXAPRO) 10 mg tablet, TAKE 1 TABLET BY MOUTH  DAILY, Disp: 90 tablet, Rfl: 2  •  isosorbide mononitrate (IMDUR) 30 mg 24 hr tablet, Take 30 mg by mouth daily, Disp: , Rfl:   •  Multiple Vitamins-Minerals (CENTRUM SILVER PO), Take by mouth, Disp: , Rfl:   •  Nexlizet 180-10 MG TABS, , Disp: , Rfl:   •  nitroglycerin (NITROSTAT) 0 4 mg SL tablet, Place 1 tablet under the tongue every 5 (five) minutes as needed, Disp: , Rfl:   •  Omega-3 Fatty Acids (FISH OIL) 1200 MG CAPS, Take by mouth, Disp: , Rfl:   •  tamsulosin (FLOMAX) 0 4 mg, Take 1 capsule (0 4 mg total) by mouth daily with dinner, Disp: 90 capsule, Rfl: 3  •  terazosin (HYTRIN) 2 mg capsule, , Disp: , Rfl:   •  warfarin (COUMADIN) 5 mg tablet, Take 5 mg by mouth, Disp: , Rfl:     Current Facility-Administered Medications:   •  cefTRIAXone (ROCEPHIN) injection 1,000 mg, 1,000 mg, Intramuscular, Once, Luan Flynn MD      Active Problems     Patient Active Problem List   Diagnosis   • Essential hypertension   • Abnormal glucose   • Anticoagulant long-term use   • CAD in native artery   • ED (erectile dysfunction)   • Hypercholesterolemia   • Hypertriglyceridemia   • Lumbar canal stenosis   • TIA (transient ischemic attack)   • Over weight   • Renal insufficiency   • Supraventricular tachycardia (HCC)   • SOB (shortness of breath)   • Anxiety   • Snoring   • SKYLER (obstructive sleep apnea)   • Central apnea   • Abnormal PSA   • Myalgia   • Elevated liver enzymes   • Stage 3a chronic kidney disease (HCC)   • Prostate cancer (HCC)   • Benign prostatic hyperplasia with nocturia         Past Medical History     Past Medical History:   Diagnosis Date   • Elevated liver enzymes    • Hypertension    • Prostate cancer ([facility] [address] )    • TIA (transient ischemic attack)          Surgical History     Past Surgical History:   Procedure Laterality Date   • CARDIAC SURGERY           Family History     Family History   Problem Relation Age of Onset   • Heart failure Father    • Heart failure Mother    • Stroke Brother    • Colon cancer Son    • No Known Problems Daughter    • No Known Problems Daughter          Social History     Social History     Social History     Tobacco Use   Smoking Status Never   Smokeless Tobacco Never         Pertinent Lab Values     Lab Results   Component Value Date    CREATININE 1 21 07/16/2022       Lab Results   Component Value Date    PSA 5 0 (H) 04/30/2022    PSA 3 4 01/05/2022    PSA 2 6 09/05/2021       @RESULTRCNT(1H])@      Pertinent Imaging      - n/a    Portions of the record may have been created with voice recognition software  Occasional wrong word or "sound a like" substitutions may have occurred due to the inherent limitations of voice recognition software  Read the chart carefully and recognize, using context, where substitutions have occurred

## 2023-01-19 ENCOUNTER — OFFICE VISIT (OUTPATIENT)
Dept: UROLOGY | Facility: CLINIC | Age: 78
End: 2023-01-19

## 2023-01-19 ENCOUNTER — OFFICE VISIT (OUTPATIENT)
Dept: INTERNAL MEDICINE CLINIC | Facility: CLINIC | Age: 78
End: 2023-01-19

## 2023-01-19 ENCOUNTER — DOCUMENTATION (OUTPATIENT)
Dept: HEMATOLOGY ONCOLOGY | Facility: CLINIC | Age: 78
End: 2023-01-19

## 2023-01-19 VITALS
HEART RATE: 73 BPM | SYSTOLIC BLOOD PRESSURE: 132 MMHG | WEIGHT: 189.8 LBS | TEMPERATURE: 96.7 F | BODY MASS INDEX: 27.17 KG/M2 | HEIGHT: 70 IN | DIASTOLIC BLOOD PRESSURE: 70 MMHG | OXYGEN SATURATION: 95 %

## 2023-01-19 VITALS
BODY MASS INDEX: 27.06 KG/M2 | WEIGHT: 189 LBS | DIASTOLIC BLOOD PRESSURE: 82 MMHG | HEART RATE: 68 BPM | OXYGEN SATURATION: 98 % | HEIGHT: 70 IN | SYSTOLIC BLOOD PRESSURE: 136 MMHG

## 2023-01-19 DIAGNOSIS — R06.02 SOB (SHORTNESS OF BREATH): ICD-10-CM

## 2023-01-19 DIAGNOSIS — C61 PROSTATE CANCER (HCC): Primary | ICD-10-CM

## 2023-01-19 DIAGNOSIS — G47.33 OSA (OBSTRUCTIVE SLEEP APNEA): ICD-10-CM

## 2023-01-19 DIAGNOSIS — N40.1 BENIGN PROSTATIC HYPERPLASIA WITH NOCTURIA: ICD-10-CM

## 2023-01-19 DIAGNOSIS — I10 ESSENTIAL HYPERTENSION: ICD-10-CM

## 2023-01-19 DIAGNOSIS — R73.09 ABNORMAL GLUCOSE: ICD-10-CM

## 2023-01-19 DIAGNOSIS — I25.10 CAD IN NATIVE ARTERY: ICD-10-CM

## 2023-01-19 DIAGNOSIS — G45.9 TIA (TRANSIENT ISCHEMIC ATTACK): ICD-10-CM

## 2023-01-19 DIAGNOSIS — R35.1 BENIGN PROSTATIC HYPERPLASIA WITH NOCTURIA: ICD-10-CM

## 2023-01-19 DIAGNOSIS — E78.00 HYPERCHOLESTEROLEMIA: ICD-10-CM

## 2023-01-19 PROBLEM — R97.20 ABNORMAL PSA: Status: RESOLVED | Noted: 2021-09-08 | Resolved: 2023-01-19

## 2023-01-19 NOTE — PROGRESS NOTES
MSW received referral and chart reviewed  MSW will initiate initial assessment and DT screening  MSW will follow

## 2023-01-19 NOTE — LETTER
January 19, 2023     Vinay Vega MD  [address]  [address], [place] [id number]    Patient: Zenaida Ghosh  YOB: 1945   Date of Visit: 1/19/2023       Dear Dr Karen Dewitt:    Thank you for referring Nara Juan to me for evaluation  Below are my notes for this consultation  If you have questions, please do not hesitate to call me  I look forward to following your patient along with you  Sincerely,        Elaine Holley MD        CC: MD Elaine Agee MD  1/19/2023 12:32 PM  Sign when Signing Visit  Referring Physician: Vinay Vega MD  A copy of this note was sent to the referring physician  Diagnoses and all orders for this visit:    Prostate cancer ([facility] [address] )    Benign prostatic hyperplasia with nocturia            Assessment and plan:       1  Newly diagnosed grade group 3 prostate cancer  -13 0  -negative multiparametric MRI  -Grade group 3  - cT2a  -Lupron x1 administered in anticipation of radiation therapy    3  BPH w severe LUTS  -History of TURP  -Likely can benefit from UroLift given low volume 25 g gland recommended prior to radiation    2 comorbidities: Obstructive sleep apnea, coronary artery disease, supraventricular tachycardia, transient ischemic attack, chronic anticoagulation    I had an excellent discussion with Valentín Miller and his dariana daughter today  We discussed his overall risk stratification for his prostate cancer, and placed this in the context of his age and comorbidities particularly his cardiac comorbidities  Given all of these factors, the patient has elected to defer any definitive local treatment for his prostate cancer for now and participate in active surveillance  He has been counseled on the option of radiation by Dr Brgiitte Turcios, but has elected for observation  I do think this is reasonable given the above        He would like to move forward with the UroLift as currently scheduled for his medically refractory lower urinary tract symptoms  We discussed the anticipated recovery time, risks, and the possibility that his storage symptoms will not be significantly improved with this procedure  He is comfortable moving forward as is his daughter  Comprehensive plan is as follows:  - Patient has elected to cancel radiation and proceed with active surveillance  I have notified our nurse navigator  - We will proceed with UroLift as currently scheduled  We will monitor his PSA every 4 months and plan to repeat an MRI next fall    Mila Deleon MD      Chief Complaint     follow-up prostate cancer, BPH      History of Present Illness     Ursula Dnonelly  is a 76 y o  male returns in follow-up  Detailed Urologic History     - please refer to HPI    Review of Systems     Review of Systems   Constitutional: Negative for activity change and fatigue  HENT: Negative for congestion  Eyes: Negative for visual disturbance  Respiratory: Negative for shortness of breath and wheezing  Cardiovascular: Negative for chest pain and leg swelling  Gastrointestinal: Negative for abdominal pain  Endocrine: Negative for polyuria  Genitourinary: Negative for dysuria, flank pain, hematuria and urgency  Musculoskeletal: Negative for back pain  Allergic/Immunologic: Negative for immunocompromised state  Neurological: Negative for dizziness and numbness  Psychiatric/Behavioral: Negative for dysphoric mood  All other systems reviewed and are negative      AUA SYMPTOM SCORE    Flowsheet Row Most Recent Value   AUA SYMPTOM SCORE    How often have you had a sensation of not emptying your bladder completely after you finished urinating? 3 (P)     How often have you had to urinate again less than two hours after you finished urinating? 3 (P)     How often have you found you stopped and started again several times when you urinate? 2 (P)     How often have you found it difficult to postpone urination? 3 (P)     How often have you had a weak urinary stream? 3 (P)     How often have you had to push or strain to begin urination? 2 (P)     How many times did you most typically get up to urinate from the time you went to bed at night until the time you got up in the morning? 2 (P)     Quality of Life: If you were to spend the rest of your life with your urinary condition just the way it is now, how would you feel about that? 3 (P)     AUA SYMPTOM SCORE 18 (P)             Allergies     No Known Allergies    Physical Exam       Physical Exam  Constitutional:       General: He is not in acute distress  Appearance: He is well-developed  HENT:      Head: Normocephalic and atraumatic  Cardiovascular:      Comments: Negative lower extremity edema  Pulmonary:      Effort: Pulmonary effort is normal       Breath sounds: Normal breath sounds  Abdominal:      Palpations: Abdomen is soft  Musculoskeletal:         General: Normal range of motion  Cervical back: Normal range of motion  Skin:     General: Skin is warm  Neurological:      Mental Status: He is alert and oriented to person, place, and time  Psychiatric:         Behavior: Behavior normal      Right side is firm, possibly consistent with T3 exam       Vital Signs  There were no vitals filed for this visit        Current Medications       Current Outpatient Medications:   •  aspirin 81 MG tablet, Take 81 mg by mouth, Disp: , Rfl:   •  candesartan (ATACAND) 16 mg tablet, , Disp: , Rfl:   •  diltiazem (CARDIZEM CD) 120 mg 24 hr capsule, Take 120 mg by mouth, Disp: , Rfl:   •  escitalopram (LEXAPRO) 10 mg tablet, TAKE 1 TABLET BY MOUTH  DAILY, Disp: 90 tablet, Rfl: 2  •  isosorbide mononitrate (IMDUR) 30 mg 24 hr tablet, Take 30 mg by mouth daily, Disp: , Rfl:   •  Multiple Vitamins-Minerals (CENTRUM SILVER PO), Take by mouth, Disp: , Rfl:   •  Nexlizet 180-10 MG TABS, , Disp: , Rfl:   •  nitroglycerin (NITROSTAT) 0 4 mg SL tablet, Place 1 tablet under the tongue every 5 (five) minutes as needed, Disp: , Rfl:   •  Omega-3 Fatty Acids (FISH OIL) 1200 MG CAPS, Take by mouth, Disp: , Rfl:   •  tamsulosin (FLOMAX) 0 4 mg, Take 1 capsule (0 4 mg total) by mouth daily with dinner, Disp: 90 capsule, Rfl: 3  •  terazosin (HYTRIN) 2 mg capsule, , Disp: , Rfl:   •  warfarin (COUMADIN) 5 mg tablet, Take 5 mg by mouth, Disp: , Rfl:     Current Facility-Administered Medications:   •  cefTRIAXone (ROCEPHIN) injection 1,000 mg, 1,000 mg, Intramuscular, Once, Melinda Kramer MD      Active Problems     Patient Active Problem List   Diagnosis   • Essential hypertension   • Abnormal glucose   • Anticoagulant long-term use   • CAD in native artery   • ED (erectile dysfunction)   • Hypercholesterolemia   • Hypertriglyceridemia   • Lumbar canal stenosis   • TIA (transient ischemic attack)   • Over weight   • Renal insufficiency   • Supraventricular tachycardia (HCC)   • SOB (shortness of breath)   • Anxiety   • Snoring   • SKYLER (obstructive sleep apnea)   • Central apnea   • Abnormal PSA   • Myalgia   • Elevated liver enzymes   • Stage 3a chronic kidney disease (HCC)   • Prostate cancer ([facility] [address] )   • Benign prostatic hyperplasia with nocturia         Past Medical History     Past Medical History:   Diagnosis Date   • Elevated liver enzymes    • Hypertension    • Prostate cancer ([facility] [address] )    • TIA (transient ischemic attack)          Surgical History     Past Surgical History:   Procedure Laterality Date   • CARDIAC SURGERY           Family History     Family History   Problem Relation Age of Onset   • Heart failure Father    • Heart failure Mother    • Stroke Brother    • Colon cancer Son    • No Known Problems Daughter    • No Known Problems Daughter          Social History     Social History     Social History     Tobacco Use   Smoking Status Never   Smokeless Tobacco Never         Pertinent Lab Values     Lab Results   Component Value Date    CREATININE 1 21 07/16/2022       Lab Results   Component Value Date    PSA 5 0 (H) 04/30/2022    PSA 3 4 01/05/2022    PSA 2 6 09/05/2021       @RESULTRCNT(1H])@      Pertinent Imaging      - n/a    Portions of the record may have been created with voice recognition software  Occasional wrong word or "sound a like" substitutions may have occurred due to the inherent limitations of voice recognition software  Read the chart carefully and recognize, using context, where substitutions have occurred

## 2023-01-19 NOTE — PROGRESS NOTES
Name: Memo Clifford  : 1945      MRN: [id number]  Encounter Provider: Angelica Hardin MD  Encounter Date: 2023   Encounter department: [address]     1  Hypercholesterolemia  Assessment & Plan:  The patient's lipid profile was reviewed with him in detail today  His cardiologist has previously taken him off his statin medication due to concerns about elevated CPK  He is currently on Nexlizet under the management of his cardiologist     Orders:  -     Lipid panel; Future; Expected date: 2023    2  Essential hypertension  Assessment & Plan:  Blood pressure assessment today indicates adequate control of his hypertension recommend the continuation of his current blood pressure management medications with regular follow-up with his cardiologist    Orders:  -     Comprehensive metabolic panel; Future; Expected date: 2023    3  TIA (transient ischemic attack)  Assessment & Plan:  No new neurologic events have been documented or noted by the patient during this visit he continues on Coumadin therapy aspirin therapy and cholesterol management  4  CAD in native artery  Assessment & Plan:  Coronary artery disease in native arteries  I believe his bypass surgeries are now approximately 15or 13years old  He has done well has no angina type symptoms on physical exertion  Last catheterization was back in  the bypasses were patent but he did have progressive native disease      5  Prostate cancer [facility])  Assessment & Plan: We have reviewed the patient's diagnosis of prostate cancer he appears to be a [place] 7  He has elected to proceed with cautious surveillance rather than radiation treatment  His chronic conditions of heart disease history of TIA and sleep apnea quite possibly have a cumulative risk greater than the risk of his prostate cancer    His urologist has outlined a plan of checking PSA every 4 months and performing a MRI scan of his prostate gland this fall  6  SKYLER (obstructive sleep apnea)  Assessment & Plan:  Recommend continuation of CPAP machine the patient is compliant with use of his machine and gets excellent response and benefit from his therapy  7  SOB (shortness of breath)  Assessment & Plan:  The patient has been successful at losing weight over the past several months he does note that his shortness of breath is actually better since the weight loss  8  Abnormal glucose  Assessment & Plan:  Glucose control remains in good range  Continue clear and caution and consumption of concentrated sugars and carbohydrates      BMI Counseling: Body mass index is 27 23 kg/m²  The BMI is above normal  Nutrition recommendations include decreasing portion sizes, encouraging healthy choices of fruits and vegetables, moderation in carbohydrate intake, increasing intake of lean protein and reducing intake of cholesterol  Rationale for BMI follow-up plan is due to patient being overweight or obese  Subjective      This 17-year-old gentleman returns today for a follow-up visit with us  Since his last visit with us he has been diagnosed with prostate cancer [place] score 7  He was in to see his urologist to discuss options is also been seen by radiation oncology services at [place] [address]  After carefully considering his options he has decided to go into a cautious surveillance mode preferring not to start radiation treatment at this time  He will have a PSA done by his urologist every 4 months and will have a follow-up MRI scan of his prostate gland this fall  He understands that if things change or he changes his mind he can certainly proceed into radiation treatment at any time  He had a discussion today about his general medical condition and his history of heart disease    He is now approximately 14 or 15 years out from his bypass surgery and judging which condition may catch up with him first and limit his lifespan I would say it may well be his heart disease  He did have a catheterization approximately 2021 which did show progressive natural artery disease but his bypasses were all patent at that time  Currently he has no symptoms to indicate any unstable angina  Patient continues to be primary care provider for his wife who has unfortunately advancing dementia  This is added to the stresses that he has to endure on a daily basis  You will be undergoing a UroLift procedure by his urologist in the near future to help alleviate his current voiding symptoms  Review of Systems   Genitourinary: Positive for difficulty urinating  All other systems reviewed and are negative  Current Outpatient Medications on File Prior to Visit   Medication Sig   • aspirin 81 MG tablet Take 81 mg by mouth   • candesartan (ATACAND) 16 mg tablet    • diltiazem (CARDIZEM CD) 120 mg 24 hr capsule Take 120 mg by mouth   • escitalopram (LEXAPRO) 10 mg tablet TAKE 1 TABLET BY MOUTH  DAILY   • Multiple Vitamins-Minerals (CENTRUM SILVER PO) Take by mouth   • Nexlizet 180-10 MG TABS    • nitroglycerin (NITROSTAT) 0 4 mg SL tablet Place 1 tablet under the tongue every 5 (five) minutes as needed   • Omega-3 Fatty Acids (FISH OIL) 1200 MG CAPS Take by mouth   • tamsulosin (FLOMAX) 0 4 mg Take 1 capsule (0 4 mg total) by mouth daily with dinner   • terazosin (HYTRIN) 2 mg capsule    • warfarin (COUMADIN) 5 mg tablet Take 5 mg by mouth   • [DISCONTINUED] isosorbide mononitrate (IMDUR) 30 mg 24 hr tablet Take 30 mg by mouth daily       Objective     /70   Pulse 73   Temp (!) 96 7 °F (35 9 °C)   Ht 5' 10" (1 778 m)   Wt 86 1 kg (189 lb 12 8 oz)   SpO2 95%   BMI 27 23 kg/m²     Physical Exam  Constitutional:       General: He is not in acute distress  Appearance: He is well-developed  He is not ill-appearing     HENT:      Right Ear: Hearing, tympanic membrane, ear canal and external ear normal       Left Ear: Hearing, tympanic membrane, ear canal and external ear normal       Nose: Nose normal    Eyes:      Conjunctiva/sclera: Conjunctivae normal       Pupils: Pupils are equal, round, and reactive to light  Neck:      Thyroid: No thyromegaly  Cardiovascular:      Rate and Rhythm: Normal rate and regular rhythm  Heart sounds: Normal heart sounds, S1 normal and S2 normal  No murmur heard  Pulmonary:      Effort: Pulmonary effort is normal       Breath sounds: Normal breath sounds  No wheezing, rhonchi or rales  Abdominal:      General: Bowel sounds are normal       Palpations: Abdomen is soft  Musculoskeletal:         General: Normal range of motion  Right lower leg: No edema  Left lower leg: No edema  Lymphadenopathy:      Cervical: No cervical adenopathy  Skin:     General: Skin is warm and dry  Neurological:      Mental Status: He is alert and oriented to person, place, and time  Deep Tendon Reflexes: Reflexes are normal and symmetric  Psychiatric:         Behavior: Behavior normal          Thought Content:  Thought content normal          Judgment: Judgment normal        Simon Iglesias MD

## 2023-01-19 NOTE — PROGRESS NOTES
Per Dr Yaw Marley patient has decided to pursue with active surveillance  Urology will continue to see patient to observe PSA  Rad Onc and urology surgery scheduler were made aware of his decision

## 2023-01-19 NOTE — TELEPHONE ENCOUNTER
Patient has chosen active surveillance per Dr Emerald Ortega  He will continue with Urology office to observe PSA for now   FYI

## 2023-02-02 ENCOUNTER — APPOINTMENT (OUTPATIENT)
Dept: LAB | Age: 78
End: 2023-02-02

## 2023-02-02 DIAGNOSIS — N40.1 BENIGN PROSTATIC HYPERPLASIA WITH NOCTURIA: ICD-10-CM

## 2023-02-02 DIAGNOSIS — R39.89 SUSPECTED UTI: ICD-10-CM

## 2023-02-02 DIAGNOSIS — R35.1 BENIGN PROSTATIC HYPERPLASIA WITH NOCTURIA: ICD-10-CM

## 2023-02-03 ENCOUNTER — PATIENT OUTREACH (OUTPATIENT)
Dept: CASE MANAGEMENT | Facility: HOSPITAL | Age: 78
End: 2023-02-03

## 2023-02-03 ENCOUNTER — TELEPHONE (OUTPATIENT)
Dept: OTHER | Facility: OTHER | Age: 78
End: 2023-02-03

## 2023-02-03 LAB — BACTERIA UR CULT: NORMAL

## 2023-02-03 NOTE — TELEPHONE ENCOUNTER
Patient called back  He is aware that it is ok to do a 4 day hold rather then a 7 day hold with his coumadin as per recommnded by Cardiology  Patient verbalized understanding

## 2023-02-03 NOTE — PERIOPERATIVE NURSING NOTE
Good Morning, I saw that pt had appt with cardiology and stress test yesterday  I did not see that Dr Lia Busby wanted CC in his note  Just want to confirm if CC was needed? Also, can you obtain EKG tracing for chart and scan/fax so that it is available for anes  Review   Thanks, Rosa  3 hours ago  Charlie Fitzgerald Aas did not order clearance for this pt , I will call the office to get a copy of the EKG sent over

## 2023-02-03 NOTE — TELEPHONE ENCOUNTER
Per patient's phone call, he saw his cardiologist yesterday  His cardiologist wants to know if ok to stop Warfarin for 4 days instead of 7 days as prep for surgery

## 2023-02-03 NOTE — TELEPHONE ENCOUNTER
Returned call to patient and spoke with his spouse Subhash Dickey, who is listed on communication consent  She was advised that per provider, ok to hold Coumadin for 4 days prior to upcoming procedure rather than 7, as recommended by Cardiology  Spouse verbalized understanding and will relay message to patient

## 2023-02-06 ENCOUNTER — PATIENT OUTREACH (OUTPATIENT)
Dept: CASE MANAGEMENT | Facility: HOSPITAL | Age: 78
End: 2023-02-06

## 2023-02-06 NOTE — PROGRESS NOTES
Biopsychosocial and Barriers Assessment    Cancer Diagnosis: Prostate cancer   Home/Cell Phone: [phone]    Emergency Contact: grecia anders (Daughter) [phone] (Mobile)  Marital Status:  to wife- jarad  Interpretation concerns, speaks another language, preferred language: english   Cultural concerns: no   Ability to read or write: yes    Caregiver/Support: wife and children   Children: son - dakota who lives in [place] [address] [address] who lives in [facility], and [place] [place] [address]  Child/Elder care: no     Housing: one story   Home Setup:  One level   Lives With: jarad   Daily Living Activities: independent  [address]: no   Ambulation:  independent    Preferred Pharmacy: [facility] co-pays with insurance: no   High co-pays with medication coverage: no   No medication coverage: no     Primary Care Provider: Risa Chappell MD  Hx of [address]: no   Hx of Short term rehab:  No   Mental Health Hx: no   Substance Abuse Hx: no   Employment: yes    Status/Location: no   Ability to pay bills: yes  POA/LW/AD: yes  Carmine is healthcare proxy  Transportation Plan/Concerns:      What do you know about your Cancer Diagnosis    What has your doctor told you about your cancer diagnosis: Prostate cancer       What has your doctor told you about your cancer treatment: no treatment  urolift on Friday  There is a hold on radiation  What specific concerns do you have about your diagnosis and treatment: no     Have you been made aware of any hair loss associated with treatment: no     Additional Comments:    MSW received a return call from patient  Patient states he is doing fine and has a supportive Patient states he currently has no concerns  Patient states he he remains busy working  MSW e-mailed Cancer support community calendar to patient's e-mail [email or web address]  Patient is provided MSW's phone number for future resource needs    MSW will close this case however will remain available for future referral

## 2023-02-07 NOTE — ASSESSMENT & PLAN NOTE
Blood pressure assessment today indicates adequate control of his hypertension recommend the continuation of his current blood pressure management medications with regular follow-up with his cardiologist
Coronary artery disease in native arteries  I believe his bypass surgeries are now approximately 15or 13years old  He has done well has no angina type symptoms on physical exertion    Last catheterization was back in 2021 the bypasses were patent but he did have progressive native disease
Glucose control remains in good range    Continue clear and caution and consumption of concentrated sugars and carbohydrates
No new neurologic events have been documented or noted by the patient during this visit he continues on Coumadin therapy aspirin therapy and cholesterol management 
Recommend continuation of CPAP machine the patient is compliant with use of his machine and gets excellent response and benefit from his therapy 
The patient has been successful at losing weight over the past several months he does note that his shortness of breath is actually better since the weight loss 
The patient's lipid profile was reviewed with him in detail today  His cardiologist has previously taken him off his statin medication due to concerns about elevated CPK    He is currently on Nexlizet under the management of his cardiologist 
We have reviewed the patient's diagnosis of prostate cancer he appears to be a [place] 7  He has elected to proceed with cautious surveillance rather than radiation treatment  His chronic conditions of heart disease history of TIA and sleep apnea quite possibly have a cumulative risk greater than the risk of his prostate cancer  His urologist has outlined a plan of checking PSA every 4 months and performing a MRI scan of his prostate gland this fall 
show

## 2023-02-07 NOTE — TELEPHONE ENCOUNTER
Pt called in stating his prep doesn't say anything about the enema   He is requesting a call back at [phone]

## 2023-02-10 ENCOUNTER — ANESTHESIA EVENT (OUTPATIENT)
Dept: PERIOP | Facility: AMBULARY SURGERY CENTER | Age: 78
End: 2023-02-10

## 2023-02-10 ENCOUNTER — TELEPHONE (OUTPATIENT)
Dept: UROLOGY | Facility: CLINIC | Age: 78
End: 2023-02-10

## 2023-02-10 ENCOUNTER — HOSPITAL ENCOUNTER (OUTPATIENT)
Facility: AMBULARY SURGERY CENTER | Age: 78
Setting detail: OUTPATIENT SURGERY
Discharge: HOME | End: 2023-02-10
Attending: UROLOGY | Admitting: UROLOGY

## 2023-02-10 ENCOUNTER — ANESTHESIA (OUTPATIENT)
Dept: PERIOP | Facility: AMBULARY SURGERY CENTER | Age: 78
End: 2023-02-10

## 2023-02-10 VITALS
HEART RATE: 67 BPM | TEMPERATURE: 96.7 F | SYSTOLIC BLOOD PRESSURE: 134 MMHG | DIASTOLIC BLOOD PRESSURE: 67 MMHG | HEIGHT: 70 IN | BODY MASS INDEX: 26.77 KG/M2 | RESPIRATION RATE: 17 BRPM | WEIGHT: 187 LBS | OXYGEN SATURATION: 98 %

## 2023-02-10 DIAGNOSIS — R35.1 BENIGN PROSTATIC HYPERPLASIA WITH NOCTURIA: Primary | ICD-10-CM

## 2023-02-10 DIAGNOSIS — N40.1 BENIGN PROSTATIC HYPERPLASIA WITH NOCTURIA: Primary | ICD-10-CM

## 2023-02-10 DEVICE — IMPLANT CARTRIDGE UROLIFT 2 HANDLE KIT: Type: IMPLANTABLE DEVICE | Site: URETHRA | Status: FUNCTIONAL

## 2023-02-10 DEVICE — IMPLANT UROLIFT ATC SYSTEM: Type: IMPLANTABLE DEVICE | Site: URETHRA | Status: FUNCTIONAL

## 2023-02-10 DEVICE — IMPLANT CARTRIDGE UROLIFT 2: Type: IMPLANTABLE DEVICE | Site: URETHRA | Status: FUNCTIONAL

## 2023-02-10 RX ORDER — SODIUM CHLORIDE, SODIUM LACTATE, POTASSIUM CHLORIDE, CALCIUM CHLORIDE 600; 310; 30; 20 MG/100ML; MG/100ML; MG/100ML; MG/100ML
50 INJECTION, SOLUTION INTRAVENOUS CONTINUOUS
Status: DISCONTINUED | OUTPATIENT
Start: 2023-02-10 | End: 2023-02-10 | Stop reason: HOSPADM

## 2023-02-10 RX ORDER — CEFAZOLIN SODIUM 2 G/50ML
2000 SOLUTION INTRAVENOUS ONCE
Status: COMPLETED | OUTPATIENT
Start: 2023-02-10 | End: 2023-02-10

## 2023-02-10 RX ORDER — DOCUSATE SODIUM 100 MG/1
100 CAPSULE, LIQUID FILLED ORAL 2 TIMES DAILY
Qty: 30 CAPSULE | Refills: 0 | Status: SHIPPED | OUTPATIENT
Start: 2023-02-10 | End: 2023-02-25

## 2023-02-10 RX ORDER — LIDOCAINE HYDROCHLORIDE 10 MG/ML
INJECTION, SOLUTION EPIDURAL; INFILTRATION; INTRACAUDAL; PERINEURAL AS NEEDED
Status: DISCONTINUED | OUTPATIENT
Start: 2023-02-10 | End: 2023-02-10

## 2023-02-10 RX ORDER — PROPOFOL 10 MG/ML
INJECTION, EMULSION INTRAVENOUS AS NEEDED
Status: DISCONTINUED | OUTPATIENT
Start: 2023-02-10 | End: 2023-02-10

## 2023-02-10 RX ORDER — PHENAZOPYRIDINE HYDROCHLORIDE 200 MG/1
200 TABLET, FILM COATED ORAL 3 TIMES DAILY PRN
Qty: 10 TABLET | Refills: 0 | Status: SHIPPED | OUTPATIENT
Start: 2023-02-10 | End: 2023-02-13

## 2023-02-10 RX ORDER — ONDANSETRON 2 MG/ML
4 INJECTION INTRAMUSCULAR; INTRAVENOUS ONCE AS NEEDED
Status: DISCONTINUED | OUTPATIENT
Start: 2023-02-10 | End: 2023-02-10 | Stop reason: HOSPADM

## 2023-02-10 RX ORDER — MAGNESIUM HYDROXIDE 1200 MG/15ML
LIQUID ORAL AS NEEDED
Status: DISCONTINUED | OUTPATIENT
Start: 2023-02-10 | End: 2023-02-10 | Stop reason: HOSPADM

## 2023-02-10 RX ORDER — PROPOFOL 10 MG/ML
INJECTION, EMULSION INTRAVENOUS CONTINUOUS PRN
Status: DISCONTINUED | OUTPATIENT
Start: 2023-02-10 | End: 2023-02-10

## 2023-02-10 RX ORDER — OXYCODONE HYDROCHLORIDE 5 MG/1
5 TABLET ORAL EVERY 4 HOURS PRN
Status: DISCONTINUED | OUTPATIENT
Start: 2023-02-10 | End: 2023-02-10 | Stop reason: HOSPADM

## 2023-02-10 RX ORDER — FENTANYL CITRATE 50 UG/ML
INJECTION, SOLUTION INTRAMUSCULAR; INTRAVENOUS AS NEEDED
Status: DISCONTINUED | OUTPATIENT
Start: 2023-02-10 | End: 2023-02-10

## 2023-02-10 RX ORDER — FENTANYL CITRATE/PF 50 MCG/ML
25 SYRINGE (ML) INJECTION
Status: DISCONTINUED | OUTPATIENT
Start: 2023-02-10 | End: 2023-02-10 | Stop reason: HOSPADM

## 2023-02-10 RX ORDER — SODIUM CHLORIDE, SODIUM LACTATE, POTASSIUM CHLORIDE, CALCIUM CHLORIDE 600; 310; 30; 20 MG/100ML; MG/100ML; MG/100ML; MG/100ML
INJECTION, SOLUTION INTRAVENOUS CONTINUOUS PRN
Status: DISCONTINUED | OUTPATIENT
Start: 2023-02-10 | End: 2023-02-10

## 2023-02-10 RX ORDER — ACETAMINOPHEN 325 MG/1
650 TABLET ORAL EVERY 4 HOURS PRN
Qty: 30 TABLET | Refills: 0
Start: 2023-02-10

## 2023-02-10 RX ADMIN — SODIUM CHLORIDE, SODIUM LACTATE, POTASSIUM CHLORIDE, AND CALCIUM CHLORIDE: .6; .31; .03; .02 INJECTION, SOLUTION INTRAVENOUS at 07:51

## 2023-02-10 RX ADMIN — OXYCODONE HYDROCHLORIDE 5 MG: 5 TABLET ORAL at 10:03

## 2023-02-10 RX ADMIN — PROPOFOL 50 MCG/KG/MIN: 10 INJECTION, EMULSION INTRAVENOUS at 08:15

## 2023-02-10 RX ADMIN — FENTANYL CITRATE 50 MCG: 50 INJECTION, SOLUTION INTRAMUSCULAR; INTRAVENOUS at 08:15

## 2023-02-10 RX ADMIN — PROPOFOL 50 MG: 10 INJECTION, EMULSION INTRAVENOUS at 08:15

## 2023-02-10 RX ADMIN — LIDOCAINE HYDROCHLORIDE 50 MG: 10 INJECTION, SOLUTION EPIDURAL; INFILTRATION; INTRACAUDAL at 08:15

## 2023-02-10 RX ADMIN — CEFAZOLIN SODIUM 2000 MG: 2 SOLUTION INTRAVENOUS at 08:10

## 2023-02-10 NOTE — ANESTHESIA PREPROCEDURE EVALUATION
Procedure:  CYSTOSCOPY WITH INSERTION UROLIFT (Urethra)    Relevant Problems   ANESTHESIA (within normal limits)      CARDIO   (+) CAD in native artery   (+) Essential hypertension   (+) Hypercholesterolemia   (+) Hypertriglyceridemia   (+) Supraventricular tachycardia (HCC)      ENDO   (-) Diabetes mellitus, type 2 (HCC)   (-) Hyperthyroidism   (-) Hypothyroidism      GI/HEPATIC   (-) Gastroesophageal reflux disease      /RENAL   (+) Prostate cancer (HCC)   (+) Renal insufficiency   (+) Stage 3a chronic kidney disease (HCC)      HEMATOLOGY (within normal limits)      MUSCULOSKELETAL (within normal limits)      NEURO/PSYCH   (+) Anxiety   (+) TIA (transient ischemic attack)      PULMONARY   (+) SKYLER (obstructive sleep apnea)   (+) SOB (shortness of breath)   (-) Asthma        Cardiology notes and testing from [facility] reviewed  Physical Exam    Airway    Mallampati score: III  TM Distance: <3 FB  Neck ROM: limited     Dental   Comment: Upper partial plate,     Cardiovascular      Pulmonary      Other Findings        Anesthesia Plan  ASA Score- 3     Anesthesia Type- IV sedation with anesthesia with ASA Monitors  Additional Monitors:   Airway Plan:           Plan Factors-Exercise tolerance (METS): >4 METS  Chart reviewed  EKG reviewed  Existing labs reviewed  Patient summary reviewed  Patient is not a current smoker  Induction- intravenous  Postoperative Plan- Plan for postoperative opioid use  Informed Consent- Anesthetic plan and risks discussed with patient  I personally reviewed this patient with the CRNA  Discussed and agreed on the Anesthesia Plan with the CRNA  Joana Carter

## 2023-02-10 NOTE — INTERVAL H&P NOTE
H&P reviewed  After examining the patient I find no changes in the patients condition since the H&P had been written      Vital signs for this patient are documented elsewhere in today's encounter

## 2023-02-10 NOTE — TELEPHONE ENCOUNTER
Patient is s/p urolift today with Dr Natalee Mcintosh:  Patient will undergo a voiding trial prior to discharge     Medications changes: We will continue his alpha blockade at the time of the next follow-up visit  If he is doing very well we will discontinue medications at that time and observe off of medical therapy  Patient will require ongoing follow-up as well for active surveillance of his prostate cancer        Attempted to call both numbers list in chart with no answer on both   Will try again later

## 2023-02-10 NOTE — ANESTHESIA POSTPROCEDURE EVALUATION
Post-Op Assessment Note    CV Status:  Stable    Pain management: adequate     Mental Status:  Lethargic and sleepy   Hydration Status:  Stable   PONV Controlled:  None   Airway Patency:  Patent      Post Op Vitals Reviewed: Yes      Staff: CRNA         No notable events documented      BP  119/60   Temp  97 6   Pulse  61   Resp   15   SpO2   94

## 2023-02-10 NOTE — OP NOTE
Operative Note     PATIENT:  David Sal (MRN [id number])    DATE OF PROCEDURE:   2/10/2023    PRE-OP DIAGNOSES:   1) BPH with obstruction  #2 history of prior TURP  3  Prostate cancer on active surveillance     POST-OP DIAGNOSES AND OPERATIVE FINDINGS:   1) BPH with obstruction  #2 history of prior TURP  3  Prostate cancer on active surveillance    PROCEDURES:  1) UroLift implantation    SURGEON:   Ester Lebron MD    No qualified teaching residents available to assist    ANESTHESIA TYPE:  IV Sedation    ESTIMATED BLOOD LOSS:   Minimal    COMPLICATIONS:   None    ANTIBIOTICS:  Cefazolin     INTRAOPERATIVE THROMBOEMBOLISM PROPHYLAXIS:  Pneumatic compression stockings    PROCEDURE SUMMARY:    The patient was identified, brought to the operating room, and placed on the table in supine position  After induction of general anesthesia, the patient was placed in dorsal lithotomy position and prepped and draped in the usual sterile fashion  A complete formal timeout was performed  A 20F cystoscope was inserted into the bladder  The cystoscopy bridge was replaced with a UroLift delivery device  Began with a cystoscopy under anesthesia prior to opening any implants  The right lateral lobe had been nicely resected however there is significant lateral protrusion from the left lateral lobe of the prostate with essentially complete obstruction from the bladder neck to the apex  I therefore focused on treating this lobe predominantly  The first treatment site was the patient's left side approximately 1 5 cm distal to the bladder neck  The distal tip of the delivery device was then angled laterally approximately 20 degrees at this position to compress the lateral lobe  The trigger was pulled, thereby deploying a needle containing the implant through the prostate  The needle was then retracted, allowing one end of the implant to be delivered to the capsular surface of the prostate    The implant was then tensioned to assure capsular seating and removal of slack monofilament  The device was then angled back toward midline and slowly advanced proximally until cystoscopic verification of the monofilament being centered in the delivery bay  The urethral end piece was then affixed to the monofilament thereby tailoring the size of the implant  Excess filament was then severed  The delivery device was then re-advanced into the bladder  The delivery device was then replaced with cystoscope and bridge and the implant location and opening effect was confirmed cystoscopically  The same procedure was then repeated on the right side, and two additional implants were delivered just proximal to the veru montanum, again one on left and one on right side of the prostate, following the same technique  A total of 4 implants were fired and 3 were successfully deployed to create a nice anterior channel  Implants were deployed in the following locations:      1  Left bladder neck (advanced tissue control device)  2  Left mid gland/apex  3  Right mid gland/apex    A final cystoscopy was conducted first to inspect the location and state of each implant and second, to confirm the presence of a continuous anterior channel was present through the prostatic urethra with irrigation flow turned off  A angelo catheter was then placed  The patient tolerated the procedure well and was transferred to the recovery room awake alert and in stable condition  IMPLANTS:   Implant Name Type Inv   Item Serial No   Lot No  LRB No  Used Action   IMPLANT CARTRIDGE UROLIFT 2 HANDLE KIT - [id number]  IMPLANT CARTRIDGE UROLIFT 2 HANDLE KIT  Teleflex: Syndiants [id number] N/A 1 Implanted   IMPLANT UROLIFT ATC SYSTEM - [id number]  IMPLANT UROLIFT ATC SYSTEM  Teleflex: edic [id number] N/A 1 Implanted   IMPLANT CARTRIDGE UROLIFT 2 HANDLE KIT - [id number]  IMPLANT CARTRIDGE UROLIFT 2 HANDLE KIT  Teleflex: Benitez Satish [id number] N/A 1 Wasted IMPLANT UROLIFT ATC SYSTEM - [id number]  IMPLANT UROLIFT ATC SYSTEM  Teleflex: Malini Median [id number] N/A 1 Implanted        PLAN:  Patient will undergo a voiding trial prior to discharge    Medications changes: We will continue his alpha blockade at the time of the next follow-up visit  If he is doing very well we will discontinue medications at that time and observe off of medical therapy    Patient will require ongoing follow-up as well for active surveillance of his prostate cancer

## 2023-02-15 NOTE — TELEPHONE ENCOUNTER
Post Op Note    Valentina Mijares  is a 76 y o  male s/p urolift performed 2/10/23  Valentina Mijares  is a patient of Dr Prateek Blue and is seen at the [facility], located within [facility] office  How would you rate your pain on a scale from 1 to 10, 10 being the worst pain ever? 0  Have you had a fever? No  Have your bowel movements been regular? Yes  Do you have any difficulty urinating? Yes still having urgency and urination  Knows this is normal post op   Advised to call office if he develops fever in addition to these symptoms     Do you have any other questions or concerns that I can address at this time? -Confirms post op appt  -Knows no heavy lifting post op as he is planning on going into his office tomorrow   -knows to call office with any concerns or s/s of a UTI

## 2023-03-10 ENCOUNTER — APPOINTMENT (OUTPATIENT)
Dept: LAB | Facility: AMBULARY SURGERY CENTER | Age: 78
End: 2023-03-10

## 2023-03-10 ENCOUNTER — OFFICE VISIT (OUTPATIENT)
Dept: UROLOGY | Facility: CLINIC | Age: 78
End: 2023-03-10

## 2023-03-10 VITALS
BODY MASS INDEX: 27.2 KG/M2 | OXYGEN SATURATION: 95 % | SYSTOLIC BLOOD PRESSURE: 120 MMHG | WEIGHT: 190 LBS | DIASTOLIC BLOOD PRESSURE: 60 MMHG | HEIGHT: 70 IN | HEART RATE: 88 BPM

## 2023-03-10 DIAGNOSIS — C61 PROSTATE CANCER (HCC): ICD-10-CM

## 2023-03-10 DIAGNOSIS — R35.1 BENIGN PROSTATIC HYPERPLASIA WITH NOCTURIA: Primary | ICD-10-CM

## 2023-03-10 DIAGNOSIS — N40.1 BENIGN PROSTATIC HYPERPLASIA WITH NOCTURIA: Primary | ICD-10-CM

## 2023-03-10 LAB
POST-VOID RESIDUAL VOLUME, ML POC: 63 ML
PSA SERPL-MCNC: 0.4 NG/ML (ref 0–4)

## 2023-03-10 NOTE — PROGRESS NOTES
UROLOGY PROGRESS NOTE   Patient Identifiers: Diann Ramirez (MRN [id number])  Date of Service: 3/10/2023    Subjective:   22-year-old man with history of newly diagnosed grade group 3 prostate cancer  He had a biopsy in November showing 4 positive cores 1 of which was 4+3 equal 7 involving 70%  He also had history of a TURP with lower urinary tract symptoms and underwent a UroLift procedure in February  PSA in October was 13 6  Gland size was 25 9  His MRI showed PI-RADS 2     Reason for visit: Prostate cancer and prostate hypertrophy post UroLift follow-up    Objective:     VITALS:    There were no vitals filed for this visit          LABS:  Lab Results   Component Value Date    HGB 15 8 07/16/2022    HCT 48 0 07/16/2022    WBC 6 61 07/16/2022     07/16/2022   ]    Lab Results   Component Value Date     (H) 09/26/2015    K 3 8 07/16/2022     (H) 07/16/2022    CO2 29 07/16/2022    BUN 18 07/16/2022    CREATININE 1 21 07/16/2022    CALCIUM 9 5 07/16/2022    GLUCOSE 99 12/18/2019   ]        INPATIENT MEDS:    Current Outpatient Medications:   •  acetaminophen (TYLENOL) 325 mg tablet, Take 2 tablets (650 mg total) by mouth every 4 (four) hours as needed for mild pain, Disp: 30 tablet, Rfl: 0  •  aspirin 81 MG tablet, Take 81 mg by mouth, Disp: , Rfl:   •  candesartan (ATACAND) 16 mg tablet, , Disp: , Rfl:   •  diclofenac sodium (VOLTAREN) 50 mg EC tablet, Take 1 tablet (50 mg total) by mouth 3 (three) times a day for 7 days, Disp: 21 tablet, Rfl: 0  •  docusate sodium (COLACE) 100 mg capsule, Take 1 capsule (100 mg total) by mouth 2 (two) times a day for 15 days, Disp: 30 capsule, Rfl: 0  •  escitalopram (LEXAPRO) 10 mg tablet, TAKE 1 TABLET BY MOUTH  DAILY, Disp: 90 tablet, Rfl: 2  •  Multiple Vitamins-Minerals (CENTRUM SILVER PO), Take by mouth, Disp: , Rfl:   •  Nexlizet 180-10 MG TABS, , Disp: , Rfl:   •  nitroglycerin (NITROSTAT) 0 4 mg SL tablet, Place 1 tablet under the tongue every 5 (five) minutes as needed, Disp: , Rfl:   •  Omega-3 Fatty Acids (FISH OIL) 1200 MG CAPS, Take by mouth, Disp: , Rfl:   •  tamsulosin (FLOMAX) 0 4 mg, Take 1 capsule (0 4 mg total) by mouth daily with dinner, Disp: 90 capsule, Rfl: 3  •  terazosin (HYTRIN) 2 mg capsule, , Disp: , Rfl:   •  warfarin (COUMADIN) 5 mg tablet, Take 5 mg by mouth, Disp: , Rfl:       Physical Exam:   There were no vitals taken for this visit  GEN: no acute distress    RESP: breathing comfortably with no accessory muscle use    ABD: soft, non-tender, non-distended     EXT: no significant peripheral edema     RADIOLOGY:   none    Assessment:   #1  Prostate hypertrophy/UroLift follow-up  #2    Prostate cancer    Plan:   -He is doing well post UroLift and is mostly satisfied with the outcome  -He has chosen observation for his prostate cancer currently  -I will see him back in 4 months with a PSA prior to visit  -He already had a consultation with radiation oncology  -He did have 1 shot of ADT so we will see what effect that has on his current PSA

## 2023-04-25 ENCOUNTER — TELEPHONE (OUTPATIENT)
Dept: OTHER | Facility: OTHER | Age: 78
End: 2023-04-25

## 2023-04-25 DIAGNOSIS — N40.1 BENIGN PROSTATIC HYPERPLASIA WITH NOCTURIA: Primary | ICD-10-CM

## 2023-04-25 DIAGNOSIS — R35.1 BENIGN PROSTATIC HYPERPLASIA WITH NOCTURIA: Primary | ICD-10-CM

## 2023-04-25 NOTE — TELEPHONE ENCOUNTER
Spoke with patient and relayed Suma's message  Urine testing orders placed in chart and patient will go to lab tomorrow  His appt was moved up to 5/9/23    Advised patient office will monitor for urine results as it typically takes 48-72 hr

## 2023-04-25 NOTE — TELEPHONE ENCOUNTER
Patient called today regarding the Procedure Uro lift has not helped his symptoms, Patient is still getting up 3 or 4 times a night to Urinate  Patient states that the [facility] is usually weak  If no answer please call [phone]

## 2023-04-25 NOTE — TELEPHONE ENCOUNTER
Patient s/p urolift in February  Had follow up with Keysha Epps in march  Does have hx of TURP with LUTS and current prostate CA under surveillance  Endorses continued symptoms of frequency at HS with weak stream  Patient was told to follow up in July

## 2023-04-26 ENCOUNTER — APPOINTMENT (OUTPATIENT)
Dept: LAB | Age: 78
End: 2023-04-26

## 2023-04-26 DIAGNOSIS — N40.1 BENIGN PROSTATIC HYPERPLASIA WITH NOCTURIA: ICD-10-CM

## 2023-04-26 DIAGNOSIS — R35.1 BENIGN PROSTATIC HYPERPLASIA WITH NOCTURIA: ICD-10-CM

## 2023-04-26 LAB
BACTERIA UR QL AUTO: ABNORMAL /HPF
BILIRUB UR QL STRIP: NEGATIVE
CAOX CRY URNS QL MICRO: ABNORMAL /HPF
CLARITY UR: CLEAR
COLOR UR: YELLOW
GLUCOSE UR STRIP-MCNC: NEGATIVE MG/DL
HGB UR QL STRIP.AUTO: NEGATIVE
KETONES UR STRIP-MCNC: NEGATIVE MG/DL
LEUKOCYTE ESTERASE UR QL STRIP: NEGATIVE
MUCOUS THREADS UR QL AUTO: ABNORMAL
NITRITE UR QL STRIP: NEGATIVE
NON-SQ EPI CELLS URNS QL MICRO: ABNORMAL /HPF
PH UR STRIP.AUTO: 6 [PH]
PROT UR STRIP-MCNC: ABNORMAL MG/DL
RBC #/AREA URNS AUTO: ABNORMAL /HPF
SP GR UR STRIP.AUTO: 1.02 (ref 1–1.03)
UROBILINOGEN UR STRIP-ACNC: <2 MG/DL
WBC #/AREA URNS AUTO: ABNORMAL /HPF

## 2023-04-28 LAB — BACTERIA UR CULT: NORMAL

## 2023-05-01 NOTE — TELEPHONE ENCOUNTER
Spoke with patient and informed him of urine testing negative for UTI  Patient is still experiencing urinary issues and confirmed his appt on 5/9/23 @ [id number]

## 2023-05-09 ENCOUNTER — OFFICE VISIT (OUTPATIENT)
Dept: UROLOGY | Facility: CLINIC | Age: 78
End: 2023-05-09

## 2023-05-09 ENCOUNTER — TELEPHONE (OUTPATIENT)
Dept: UROLOGY | Facility: CLINIC | Age: 78
End: 2023-05-09

## 2023-05-09 VITALS
BODY MASS INDEX: 27.35 KG/M2 | HEIGHT: 70 IN | DIASTOLIC BLOOD PRESSURE: 72 MMHG | SYSTOLIC BLOOD PRESSURE: 130 MMHG | WEIGHT: 191 LBS

## 2023-05-09 DIAGNOSIS — N32.81 OAB (OVERACTIVE BLADDER): ICD-10-CM

## 2023-05-09 DIAGNOSIS — C61 PROSTATE CANCER (HCC): ICD-10-CM

## 2023-05-09 DIAGNOSIS — R35.1 BENIGN PROSTATIC HYPERPLASIA WITH NOCTURIA: Primary | ICD-10-CM

## 2023-05-09 DIAGNOSIS — N40.1 BENIGN PROSTATIC HYPERPLASIA WITH NOCTURIA: Primary | ICD-10-CM

## 2023-05-09 LAB — POST-VOID RESIDUAL VOLUME, ML POC: 67 ML

## 2023-05-09 RX ORDER — OXYBUTYNIN CHLORIDE 5 MG/1
5 TABLET ORAL
Qty: 30 TABLET | Refills: 3 | Status: SHIPPED | OUTPATIENT
Start: 2023-05-09

## 2023-05-09 NOTE — TELEPHONE ENCOUNTER
Noted on Excel spreadsheet for DOS 7/12/23  LUPRON 45mg - Medicare/HOP - NO authorization required  Office stock okay to use  Please get ABN signed for 2023    *VLD 5/9/23

## 2023-05-09 NOTE — PROGRESS NOTES
UROLOGY PROGRESS NOTE   Patient Identifiers: Damien Levi (MRN [id number])  Date of Service: 5/9/2023    Subjective:   77-year-old man history of grade group 3 prostate cancer  Biopsy showed 4 cores of 4+3 equal 7 prostate cancer involving 70%  He has a history of a TURP and recently underwent a UroLift in February  He reports difficulty with frequency and urgency and PVR 67 mL      Reason for visit: UroLift follow-up    Objective:     VITALS:    Vitals:    05/09/23 0924   BP: 130/72           LABS:  Lab Results   Component Value Date    HGB 15 8 07/16/2022    HCT 48 0 07/16/2022    WBC 6 61 07/16/2022     07/16/2022   ]    Lab Results   Component Value Date     (H) 09/26/2015    K 3 8 07/16/2022     (H) 07/16/2022    CO2 29 07/16/2022    BUN 18 07/16/2022    CREATININE 1 21 07/16/2022    CALCIUM 9 5 07/16/2022    GLUCOSE 99 12/18/2019   ]        INPATIENT MEDS:    Current Outpatient Medications:   •  acetaminophen (TYLENOL) 325 mg tablet, Take 2 tablets (650 mg total) by mouth every 4 (four) hours as needed for mild pain, Disp: 30 tablet, Rfl: 0  •  aspirin 81 MG tablet, Take 81 mg by mouth, Disp: , Rfl:   •  candesartan (ATACAND) 16 mg tablet, , Disp: , Rfl:   •  escitalopram (LEXAPRO) 10 mg tablet, TAKE 1 TABLET BY MOUTH  DAILY, Disp: 90 tablet, Rfl: 2  •  Multiple Vitamins-Minerals (CENTRUM SILVER PO), Take by mouth, Disp: , Rfl:   •  Nexlizet 180-10 MG TABS, , Disp: , Rfl:   •  nitroglycerin (NITROSTAT) 0 4 mg SL tablet, Place 1 tablet under the tongue every 5 (five) minutes as needed, Disp: , Rfl:   •  Omega-3 Fatty Acids (FISH OIL) 1200 MG CAPS, Take by mouth, Disp: , Rfl:   •  oxybutynin (DITROPAN) 5 mg tablet, Take 1 tablet (5 mg total) by mouth daily at bedtime, Disp: 30 tablet, Rfl: 3  •  tamsulosin (FLOMAX) 0 4 mg, Take 1 capsule (0 4 mg total) by mouth daily with dinner, Disp: 90 capsule, Rfl: 3  •  terazosin (HYTRIN) 2 mg capsule, , Disp: , Rfl:   •  warfarin (COUMADIN) 5 mg "tablet, Take 5 mg by mouth, Disp: , Rfl:       Physical Exam:   /72 (BP Location: Left arm, Patient Position: Sitting, Cuff Size: Adult)   Ht 5' 10\" (1 778 m)   Wt 86 6 kg (191 lb)   BMI 27 41 kg/m²   GEN: no acute distress    RESP: breathing comfortably with no accessory muscle use    ABD: soft, non-tender, non-distended   INCISION:    EXT: no significant peripheral edema     RADIOLOGY:   None    Assessment:   #1  UroLift follow-up  #2    Prostate cancer    Plan:   -I will give him a trial of oxybutynin at night it seems to be his main complaint  -He has an appointment with me in July for his next Lupron with a PSA prior to visit  -We discussed the possible need for a cystoscopy in the future if he has persistent symptoms  -          "

## 2023-05-09 NOTE — TELEPHONE ENCOUNTER
Patient is scheduled on 7/12 for Lupron  Provider note:      Return for July with me for PSA and Lupron

## 2023-05-12 ENCOUNTER — RA CDI HCC (OUTPATIENT)
Dept: OTHER | Facility: HOSPITAL | Age: 78
End: 2023-05-12

## 2023-05-12 NOTE — PROGRESS NOTES
[address]  coding opportunities       Chart reviewed, no opportunity found: CHART REVIEWED, NO OPPORTUNITY FOUND        Patients Insurance     Medicare Insurance: Medicare

## 2023-05-18 ENCOUNTER — OFFICE VISIT (OUTPATIENT)
Dept: INTERNAL MEDICINE CLINIC | Facility: CLINIC | Age: 78
End: 2023-05-18

## 2023-05-18 VITALS
DIASTOLIC BLOOD PRESSURE: 68 MMHG | HEIGHT: 70 IN | BODY MASS INDEX: 27.83 KG/M2 | WEIGHT: 194.4 LBS | HEART RATE: 77 BPM | SYSTOLIC BLOOD PRESSURE: 122 MMHG | OXYGEN SATURATION: 93 % | TEMPERATURE: 96.8 F

## 2023-05-18 DIAGNOSIS — R73.09 ABNORMAL GLUCOSE: Primary | ICD-10-CM

## 2023-05-18 DIAGNOSIS — E78.00 HYPERCHOLESTEROLEMIA: ICD-10-CM

## 2023-05-18 DIAGNOSIS — I25.10 CAD IN NATIVE ARTERY: ICD-10-CM

## 2023-05-18 DIAGNOSIS — E78.1 HYPERTRIGLYCERIDEMIA: ICD-10-CM

## 2023-05-18 DIAGNOSIS — I10 ESSENTIAL HYPERTENSION: ICD-10-CM

## 2023-05-18 DIAGNOSIS — N18.2 STAGE 2 CHRONIC KIDNEY DISEASE: ICD-10-CM

## 2023-05-18 DIAGNOSIS — E66.3 OVER WEIGHT: ICD-10-CM

## 2023-05-18 RX ORDER — DILTIAZEM HYDROCHLORIDE 120 MG/1
CAPSULE, COATED, EXTENDED RELEASE ORAL
COMMUNITY
Start: 2023-05-17

## 2023-05-19 PROBLEM — N18.2 STAGE 2 CHRONIC KIDNEY DISEASE: Status: ACTIVE | Noted: 2022-12-08

## 2023-05-19 PROBLEM — N28.9 RENAL INSUFFICIENCY: Status: RESOLVED | Noted: 2018-07-31 | Resolved: 2023-05-19

## 2023-05-19 NOTE — PROGRESS NOTES
Name: Meka De Leon  : 1945      MRN: [id number]  Encounter Provider: Valerie Palacios MD  Encounter Date: 2023   Encounter department: [address]     1  Abnormal glucose  Assessment & Plan:  Elevated fasting blood sugar reviewed with the patient recommend weight loss exercise and reduction of carbohydrates and concentrated sugars in the diet  Follow-up in 2 months with comprehensive metabolic profile and hemoglobin A1c    Orders:  -     Comprehensive metabolic panel; Future; Expected date: 2023  -     Hemoglobin A1C; Future; Expected date: 2023    2  Essential hypertension  Assessment & Plan:  Blood pressure assessment confirms good control of hypertension recommend continuation of and a Scott at 16 mg daily and diltiazem  and 20 mg daily  And continuation of Calhoun and 2 mg daily      3  CAD in native artery  Assessment & Plan:  Patient denies any cardiac symptoms of chest pain palpitations or shortness of breath  He continues regular follow-up visits with his cardiologist   He is encouraged to continue on his low-dose aspirin 81 mg daily as well as his low-cholesterol diet and omega-3 fatty acids  He has been intolerant of statins on previous trials      4  Hypertriglyceridemia  Assessment & Plan:  Triglyceride reading reviewed with the patient continue omega-3 fatty acids and low carbohydrate diet  5  Hypercholesterolemia  Assessment & Plan:  Faith Grippe all values reviewed with patient recommend continuation of low-cholesterol diet and omega-3 fatty acid      6  Over weight  Assessment & Plan:  The patient's weight has gradually increased over the past several months  He and his wife have been eating out more often since she no longer cooks at home  Recommend caution with consumption of carbohydrates and concentrated sweets      7   Stage 2 chronic kidney disease  Assessment & Plan:  Lab Results   Component Value Date    EGFR 56 07/16/2022    EGFR 61 04/30/2022    EGFR 63 01/05/2022    CREATININE 1 21 07/16/2022    CREATININE 1 14 04/30/2022    CREATININE 1 10 01/05/2022   The patient's most recent blood work indicates an improvement in GFR to 70 cc/min placing him in stage II mild chronic kidney disease continue surveillance avoid nonsteroidal anti-inflammatories other than low-dose aspirin and avoid dehydration states           Subjective      This 68-year-old gentleman returns today for routine 4-month follow-up visit with us  He has experienced an increase in frequency of urination  Concerned as he expected his UroLift procedure to solve this problem  He saw his urologist recently and was started on oxybutynin medication  I encouraged the patient to follow-up with urology if this does not resolve his symptoms  Patient has had no cardiac issues such as chest pain palpitations or shortness of breath  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 who has advancing dementia  She now does less of the housework and cooking than she used to in the past this has placed additional stresses on the patient to cooking as well as housecleaning and watch his wife  He has decreased his hours of work so that he is at home at the times that she needs a dosing of her medication  Review of Systems   Genitourinary: Positive for frequency  All other systems reviewed and are negative        Current Outpatient Medications on File Prior to Visit   Medication Sig   • acetaminophen (TYLENOL) 325 mg tablet Take 2 tablets (650 mg total) by mouth every 4 (four) hours as needed for mild pain   • aspirin 81 MG tablet Take 81 mg by mouth   • candesartan (ATACAND) 16 mg tablet    • diltiazem (CARDIZEM CD) 120 mg 24 hr capsule    • escitalopram (LEXAPRO) 10 mg tablet TAKE 1 TABLET BY MOUTH  DAILY   • Multiple Vitamins-Minerals (CENTRUM SILVER PO) Take by mouth   • Nexlizet 180-10 MG TABS    • nitroglycerin (NITROSTAT) 0 4 "mg SL tablet Place 1 tablet under the tongue every 5 (five) minutes as needed   • Omega-3 Fatty Acids (FISH OIL) 1200 MG CAPS Take by mouth   • oxybutynin (DITROPAN) 5 mg tablet Take 1 tablet (5 mg total) by mouth daily at bedtime   • tamsulosin (FLOMAX) 0 4 mg Take 1 capsule (0 4 mg total) by mouth daily with dinner   • terazosin (HYTRIN) 2 mg capsule    • warfarin (COUMADIN) 5 mg tablet Take 5 mg by mouth       Objective     /68   Pulse 77   Temp (!) 96 8 °F (36 °C)   Ht 5' 10\" (1 778 m)   Wt 88 2 kg (194 lb 6 4 oz)   SpO2 93%   BMI 27 89 kg/m²     Physical Exam  Constitutional:       General: He is not in acute distress  Appearance: He is well-developed  He is not ill-appearing  HENT:      Head: Normocephalic  Right Ear: Hearing and external ear normal       Left Ear: Hearing and external ear normal       Nose: Nose normal    Eyes:      Conjunctiva/sclera: Conjunctivae normal       Pupils: Pupils are equal, round, and reactive to light  Neck:      Thyroid: No thyromegaly  Cardiovascular:      Rate and Rhythm: Normal rate and regular rhythm  Heart sounds: Normal heart sounds, S1 normal and S2 normal  No murmur heard  Pulmonary:      Effort: Pulmonary effort is normal       Breath sounds: Normal breath sounds  No wheezing or rhonchi  Abdominal:      General: Bowel sounds are normal       Palpations: Abdomen is soft  Musculoskeletal:         General: Normal range of motion  Right lower leg: No edema  Left lower leg: No edema  Lymphadenopathy:      Cervical: No cervical adenopathy  Skin:     General: Skin is warm and dry  Neurological:      Mental Status: He is alert and oriented to person, place, and time  Mental status is at baseline  Deep Tendon Reflexes: Reflexes are normal and symmetric  Psychiatric:         Behavior: Behavior normal          Thought Content:  Thought content normal          Judgment: Judgment normal        Leslie Saleem MD  "

## 2023-05-19 NOTE — ASSESSMENT & PLAN NOTE
Elevated fasting blood sugar reviewed with the patient recommend weight loss exercise and reduction of carbohydrates and concentrated sugars in the diet    Follow-up in 2 months with comprehensive metabolic profile and hemoglobin A1c

## 2023-05-19 NOTE — ASSESSMENT & PLAN NOTE
Lab Results   Component Value Date    EGFR 56 07/16/2022    EGFR 61 04/30/2022    EGFR 63 01/05/2022    CREATININE 1 21 07/16/2022    CREATININE 1 14 04/30/2022    CREATININE 1 10 01/05/2022   The patient's most recent blood work indicates an improvement in GFR to 70 cc/min placing him in stage II mild chronic kidney disease continue surveillance avoid nonsteroidal anti-inflammatories other than low-dose aspirin and avoid dehydration states

## 2023-05-19 NOTE — ASSESSMENT & PLAN NOTE
Patient denies any cardiac symptoms of chest pain palpitations or shortness of breath  He continues regular follow-up visits with his cardiologist   He is encouraged to continue on his low-dose aspirin 81 mg daily as well as his low-cholesterol diet and omega-3 fatty acids    He has been intolerant of statins on previous trials

## 2023-05-19 NOTE — ASSESSMENT & PLAN NOTE
Blood pressure assessment confirms good control of hypertension recommend continuation of and a Scott at 16 mg daily and diltiazem  and 20 mg daily    And continuation of Calhoun and 2 mg daily

## 2023-05-19 NOTE — ASSESSMENT & PLAN NOTE
Triglyceride reading reviewed with the patient continue omega-3 fatty acids and low carbohydrate diet

## 2023-05-19 NOTE — ASSESSMENT & PLAN NOTE
The patient's weight has gradually increased over the past several months  He and his wife have been eating out more often since she no longer cooks at home    Recommend caution with consumption of carbohydrates and concentrated sweets

## 2023-05-19 NOTE — ASSESSMENT & PLAN NOTE
Sally Bentley all values reviewed with patient recommend continuation of low-cholesterol diet and omega-3 fatty acid

## 2023-07-09 DIAGNOSIS — N40.1 BPH WITH OBSTRUCTION/LOWER URINARY TRACT SYMPTOMS: ICD-10-CM

## 2023-07-09 DIAGNOSIS — N13.8 BPH WITH OBSTRUCTION/LOWER URINARY TRACT SYMPTOMS: ICD-10-CM

## 2023-07-10 RX ORDER — TAMSULOSIN HYDROCHLORIDE 0.4 MG/1
CAPSULE ORAL
Qty: 90 CAPSULE | Refills: 3 | Status: SHIPPED | OUTPATIENT
Start: 2023-07-10

## 2023-07-12 ENCOUNTER — OFFICE VISIT (OUTPATIENT)
Dept: UROLOGY | Facility: CLINIC | Age: 78
End: 2023-07-12
Payer: MEDICARE

## 2023-07-12 VITALS
HEIGHT: 70 IN | WEIGHT: 195 LBS | SYSTOLIC BLOOD PRESSURE: 138 MMHG | DIASTOLIC BLOOD PRESSURE: 70 MMHG | BODY MASS INDEX: 27.92 KG/M2

## 2023-07-12 DIAGNOSIS — N18.31 STAGE 3A CHRONIC KIDNEY DISEASE (HCC): ICD-10-CM

## 2023-07-12 DIAGNOSIS — C61 PROSTATE CANCER (HCC): Primary | ICD-10-CM

## 2023-07-12 PROCEDURE — 99213 OFFICE O/P EST LOW 20 MIN: CPT | Performed by: PHYSICIAN ASSISTANT

## 2023-07-12 PROCEDURE — 96402 CHEMO HORMON ANTINEOPL SQ/IM: CPT

## 2023-07-12 NOTE — PROGRESS NOTES
UROLOGY PROGRESS NOTE   Patient Identifiers: Dai Marinelli (MRN [id number])  Date of Service: 7/12/2023    Subjective:   66-year-old man with newly diagnosed grade group 3 prostate cancer. He did biopsy in November showing 4 positive cores 1 of which was 4+3 equal 7 involving 70%. He has a history of a TURP as well as UroLift. MRI showed PI-RADS 2. His PSA in October was 13.6. He agreed to ADT but has declined definitive radiation so far. He did have a consultation previously. Current PSA is 0. 11.     Reason for visit: Prostate cancer follow-up    Objective:     VITALS:    Vitals:    07/12/23 1319   BP: 138/70           LABS:  Lab Results   Component Value Date    HGB 15.8 07/16/2022    HCT 48.0 07/16/2022    WBC 6.61 07/16/2022     07/16/2022   ]    Lab Results   Component Value Date     (H) 09/26/2015    K 3.8 07/16/2022     (H) 07/16/2022    CO2 29 07/16/2022    BUN 18 07/16/2022    CREATININE 1.21 07/16/2022    CALCIUM 9.5 07/16/2022    GLUCOSE 99 12/18/2019   ]        INPATIENT MEDS:    Current Outpatient Medications:   •  acetaminophen (TYLENOL) 325 mg tablet, Take 2 tablets (650 mg total) by mouth every 4 (four) hours as needed for mild pain, Disp: 30 tablet, Rfl: 0  •  aspirin 81 MG tablet, Take 81 mg by mouth, Disp: , Rfl:   •  candesartan (ATACAND) 16 mg tablet, , Disp: , Rfl:   •  diltiazem (CARDIZEM CD) 120 mg 24 hr capsule, , Disp: , Rfl:   •  escitalopram (LEXAPRO) 10 mg tablet, TAKE 1 TABLET BY MOUTH  DAILY, Disp: 90 tablet, Rfl: 2  •  Multiple Vitamins-Minerals (CENTRUM SILVER PO), Take by mouth, Disp: , Rfl:   •  Nexlizet 180-10 MG TABS, , Disp: , Rfl:   •  nitroglycerin (NITROSTAT) 0.4 mg SL tablet, Place 1 tablet under the tongue every 5 (five) minutes as needed, Disp: , Rfl:   •  Omega-3 Fatty Acids (FISH OIL) 1200 MG CAPS, Take by mouth, Disp: , Rfl:   •  oxybutynin (DITROPAN) 5 mg tablet, TAKE 1 TABLET BY MOUTH AT BEDTIME, Disp: 90 tablet, Rfl: 3  •  tamsulosin (FLOMAX) 0.4 mg, TAKE 1 CAPSULE BY MOUTH  DAILY WITH DINNER, Disp: 90 capsule, Rfl: 3  •  terazosin (HYTRIN) 2 mg capsule, , Disp: , Rfl:   •  warfarin (COUMADIN) 5 mg tablet, Take 5 mg by mouth, Disp: , Rfl:   No current facility-administered medications for this visit. Physical Exam:   /70 (BP Location: Left arm, Patient Position: Sitting, Cuff Size: Adult)   Ht 5' 10" (1.778 m)   Wt 88.5 kg (195 lb)   BMI 27.98 kg/m²   GEN: no acute distress    RESP: breathing comfortably with no accessory muscle use    ABD: soft, non-tender, non-distended      EXT: no significant peripheral edema       RADIOLOGY:   None    Assessment:   #1.   Grade group 3 Jason 7 prostate cancer    Plan:   -Lupron 45 mg given today  -He is considering proceeding with definitive radiation in fall-  -I will see him back in 4 months with a PSA prior to visit and if agreeable will refer him back to radiation oncology and arrange for [employer] and gold fiducial markers  -He could conceivably continue on ADT  -I did encourage him to proceed with definitive radiation

## 2023-07-20 ENCOUNTER — RA CDI HCC (OUTPATIENT)
Dept: OTHER | Facility: HOSPITAL | Age: 78
End: 2023-07-20

## 2023-07-20 NOTE — PROGRESS NOTES
[address] [facility] coding opportunities       Chart reviewed, no opportunity found: CHART REVIEWED, NO OPPORTUNITY FOUND        Patients Insurance     Medicare Insurance: Medicare

## 2023-07-21 LAB — HBA1C MFR BLD HPLC: 5.5 %

## 2023-07-26 ENCOUNTER — OFFICE VISIT (OUTPATIENT)
Dept: INTERNAL MEDICINE CLINIC | Facility: CLINIC | Age: 78
End: 2023-07-26
Payer: MEDICARE

## 2023-07-26 VITALS
HEIGHT: 70 IN | TEMPERATURE: 97.8 F | SYSTOLIC BLOOD PRESSURE: 128 MMHG | WEIGHT: 194 LBS | DIASTOLIC BLOOD PRESSURE: 76 MMHG | HEART RATE: 68 BPM | BODY MASS INDEX: 27.77 KG/M2 | OXYGEN SATURATION: 97 %

## 2023-07-26 DIAGNOSIS — C61 PROSTATE CANCER (HCC): ICD-10-CM

## 2023-07-26 DIAGNOSIS — N18.2 STAGE 2 CHRONIC KIDNEY DISEASE: ICD-10-CM

## 2023-07-26 DIAGNOSIS — I10 ESSENTIAL HYPERTENSION: ICD-10-CM

## 2023-07-26 DIAGNOSIS — E78.00 HYPERCHOLESTEROLEMIA: ICD-10-CM

## 2023-07-26 DIAGNOSIS — R73.09 ABNORMAL GLUCOSE: Primary | ICD-10-CM

## 2023-07-26 DIAGNOSIS — F41.9 ANXIETY: ICD-10-CM

## 2023-07-26 DIAGNOSIS — E78.1 HYPERTRIGLYCERIDEMIA: ICD-10-CM

## 2023-07-26 DIAGNOSIS — I25.10 CAD IN NATIVE ARTERY: ICD-10-CM

## 2023-07-26 PROBLEM — N18.31 STAGE 3A CHRONIC KIDNEY DISEASE (HCC): Status: RESOLVED | Noted: 2023-07-12 | Resolved: 2023-07-26

## 2023-07-26 PROCEDURE — 99215 OFFICE O/P EST HI 40 MIN: CPT | Performed by: INTERNAL MEDICINE

## 2023-07-26 NOTE — ASSESSMENT & PLAN NOTE
Lipid profile reviewed with the patient today recommend the continuation of low-cholesterol diet as well as omega-3 fatty acid supplementation.

## 2023-07-26 NOTE — ASSESSMENT & PLAN NOTE
Blood sugar was reviewed today and remains in a normal range at 94. Recommend continue regular exercise maintaining healthy weight and avoidance of excessive carbohydrates or concentrated sugars in the diet.

## 2023-07-26 NOTE — ASSESSMENT & PLAN NOTE
Currently on Lupron treatment for the patient's prostate cancer I have reviewed his most recent visit with his urology service. There was discussion regarding possible radiation treatments this fall. Patient indicates is not made his mind up yet about whether he wants to proceed with these treatments.   He will be seeing his urologist again in the fall and will discuss the radiation treatments further

## 2023-07-26 NOTE — ASSESSMENT & PLAN NOTE
Triglycerides reviewed with the patient today recommend continuation of diet restricted in carbohydrate and concentrated sugars as well as continuation omega-3 fatty acid

## 2023-07-26 NOTE — ASSESSMENT & PLAN NOTE
Lab Results   Component Value Date    EGFR 56 07/16/2022    EGFR 61 04/30/2022    EGFR 63 01/05/2022    CREATININE 1.21 07/16/2022    CREATININE 1.14 04/30/2022    CREATININE 1.10 01/05/2022   Mild decrease in GFR noted on most recent blood work we will continue to monitor carefully. Advice continued good control of blood pressure and avoidance of dehydration and avoidance of nonsteroidal anti-inflammatories other than low-dose aspirin 81 mg daily.

## 2023-07-26 NOTE — ASSESSMENT & PLAN NOTE
Assessment of the patient's hypertension today confirms adequate control recommend the continuation of his candesartan at 16 mg daily, Hytrin 2 mg daily, diltiazem 120 mg daily.

## 2023-07-26 NOTE — ASSESSMENT & PLAN NOTE
Assessment of the patient's anxiety today indicates continued benefit of Lexapro at 10 mg daily no apparent side effects recommend continuation of therapy.

## 2023-07-26 NOTE — ASSESSMENT & PLAN NOTE
Patient has no recent symptoms of chest pain or palpitations.   No evidence of congestive heart failure recommend continuation of current cardiac medications including low-dose aspirin 81 mg daily low-cholesterol diet

## 2023-07-26 NOTE — PROGRESS NOTES
Name: Abbie Lopez. : 1945      MRN: [id number]  Encounter Provider: Esau Da Silva MD  Encounter Date: 2023   [facility]    Assessment & Plan     1. Abnormal glucose  Assessment & Plan:  Blood sugar was reviewed today and remains in a normal range at 94. Recommend continue regular exercise maintaining healthy weight and avoidance of excessive carbohydrates or concentrated sugars in the diet. Orders:  -     Comprehensive metabolic panel; Future; Expected date: 2023    2. Hypercholesterolemia  Assessment & Plan:  Lipid profile reviewed with the patient today recommend the continuation of low-cholesterol diet as well as omega-3 fatty acid supplementation. Orders:  -     Lipid panel; Future; Expected date: 2023    3. Essential hypertension  Assessment & Plan:  Assessment of the patient's hypertension today confirms adequate control recommend the continuation of his candesartan at 16 mg daily, Hytrin 2 mg daily, diltiazem 120 mg daily. 4. CAD in native artery  Assessment & Plan:  Patient has no recent symptoms of chest pain or palpitations. No evidence of congestive heart failure recommend continuation of current cardiac medications including low-dose aspirin 81 mg daily low-cholesterol diet      5. Stage 2 chronic kidney disease  Assessment & Plan:  Lab Results   Component Value Date    EGFR 56 2022    EGFR 61 2022    EGFR 63 2022    CREATININE 1.21 2022    CREATININE 1.14 2022    CREATININE 1.10 2022   Mild decrease in GFR noted on most recent blood work we will continue to monitor carefully. Advice continued good control of blood pressure and avoidance of dehydration and avoidance of nonsteroidal anti-inflammatories other than low-dose aspirin 81 mg daily.       6. Prostate cancer [facility])  Assessment & Plan:  Currently on Lupron treatment for the patient's prostate cancer I have reviewed his most recent visit with his urology service. There was discussion regarding possible radiation treatments this fall. Patient indicates is not made his mind up yet about whether he wants to proceed with these treatments. He will be seeing his urologist again in the fall and will discuss the radiation treatments further      7. Hypertriglyceridemia  Assessment & Plan:  Triglycerides reviewed with the patient today recommend continuation of diet restricted in carbohydrate and concentrated sugars as well as continuation omega-3 fatty acid      8. Anxiety  Assessment & Plan:  Assessment of the patient's anxiety today indicates continued benefit of Lexapro at 10 mg daily no apparent side effects recommend continuation of therapy. Subjective     This pleasant 76year-old patient presents today for his routine 4-month follow-up visit. Here today in the presence of his wife. He reports no complaints on today's visit. During today's visit we reviewed his blood pressure as well as recent blood work. He has had no symptoms of cardiac nature such as chest pain palpitations. He does have chronic mild shortness of breath on exertion. He has a history of prostate cancer currently on Lupron injections. He may be considering possibility of radiation treatment to the prostate in the future. I reviewed his most recent visit with his urologist during today's visit. Review of Systems   All other systems reviewed and are negative.       Past Medical History:   Diagnosis Date   • Elevated liver enzymes    • Hypertension    • Prostate cancer ([address])    • TIA (transient ischemic attack)      Past Surgical History:   Procedure Laterality Date   • CARDIAC SURGERY     • MS CYSTO INSERTION TRANSPROSTATIC IMPLANT SINGLE N/A 2/10/2023    Procedure: CYSTOSCOPY WITH INSERTION Daphine Seen;  Surgeon: Davie Varela MD;  Location: [facility] MAIN OR;  Service: Urology     Family History   Problem Relation Age of Onset • Heart failure Father    • Heart failure Mother    • Stroke Brother    • Colon cancer Son    • No Known Problems Daughter    • No Known Problems Daughter      Social History     Socioeconomic History   • Marital status: /Civil Union     Spouse name: Not on file   • Number of children: Not on file   • Years of education: Not on file   • Highest education level: Not on file   Occupational History   • Not on file   Tobacco Use   • Smoking status: Never   • Smokeless tobacco: Never   Vaping Use   • Vaping Use: Never used   Substance and Sexual Activity   • Alcohol use: Never   • Drug use: Never   • Sexual activity: Not Currently   Other Topics Concern   • Not on file   Social History Narrative   • Not on file     Social Determinants of Health     Financial Resource Strain: Not on file   Food Insecurity: Not on file   Transportation Needs: Not on file   Physical Activity: Not on file   Stress: Not on file   Social Connections: Not on file   Intimate Partner Violence: Not on file   Housing Stability: Not on file     Current Outpatient Medications on File Prior to Visit   Medication Sig   • acetaminophen (TYLENOL) 325 mg tablet Take 2 tablets (650 mg total) by mouth every 4 (four) hours as needed for mild pain   • aspirin 81 MG tablet Take 81 mg by mouth   • candesartan (ATACAND) 16 mg tablet    • diltiazem (CARDIZEM CD) 120 mg 24 hr capsule    • escitalopram (LEXAPRO) 10 mg tablet TAKE 1 TABLET BY MOUTH  DAILY   • Multiple Vitamins-Minerals (CENTRUM SILVER PO) Take by mouth   • Nexlizet 180-10 MG TABS    • nitroglycerin (NITROSTAT) 0.4 mg SL tablet Place 1 tablet under the tongue every 5 (five) minutes as needed   • Omega-3 Fatty Acids (FISH OIL) 1200 MG CAPS Take by mouth   • oxybutynin (DITROPAN) 5 mg tablet TAKE 1 TABLET BY MOUTH AT BEDTIME   • tamsulosin (FLOMAX) 0.4 mg TAKE 1 CAPSULE BY MOUTH  DAILY WITH DINNER   • terazosin (HYTRIN) 2 mg capsule    • warfarin (COUMADIN) 5 mg tablet Take 5 mg by mouth Allergies   Allergen Reactions   • Statins Other (See Comments)     Elevated CPK     Immunization History   Administered Date(s) Administered   • COVID-19 MODERNA VACC 0.25 ML IM BOOSTER 10/28/2021   • COVID-19 MODERNA VACC 0.5 ML IM 01/23/2021, 02/19/2021, 10/28/2021   • INFLUENZA 11/07/2007, 11/01/2018, 11/09/2019, 11/08/2020, 11/05/2021, 11/07/2021, 11/06/2022   • Influenza, seasonal, injectable 11/07/2007   • Pneumococcal Conjugate 13-Valent 12/22/2016   • Pneumococcal Polysaccharide PPV23 08/16/2018   • Zoster 12/18/2013   • Zoster Vaccine Recombinant 10/16/2020, 12/18/2020       Objective     /76   Pulse 68   Temp 97.8 °F (36.6 °C)   Ht 5' 10" (1.778 m)   Wt 88 kg (194 lb)   SpO2 97%   BMI 27.84 kg/m²     Physical Exam  Constitutional:       General: He is not in acute distress. Appearance: He is well-developed. He is not ill-appearing. HENT:      Head: Normocephalic. Right Ear: Hearing and external ear normal.      Left Ear: Hearing and external ear normal.      Nose: Nose normal.   Eyes:      Conjunctiva/sclera: Conjunctivae normal.      Pupils: Pupils are equal, round, and reactive to light. Neck:      Thyroid: No thyromegaly. Cardiovascular:      Rate and Rhythm: Normal rate and regular rhythm. Heart sounds: Normal heart sounds, S1 normal and S2 normal. No murmur heard. Pulmonary:      Effort: Pulmonary effort is normal.      Breath sounds: Normal breath sounds. No wheezing, rhonchi or rales. Abdominal:      General: Bowel sounds are normal.      Palpations: Abdomen is soft. Tenderness: There is no abdominal tenderness. Musculoskeletal:         General: Normal range of motion. Right lower leg: No edema. Left lower leg: No edema. Lymphadenopathy:      Cervical: No cervical adenopathy. Skin:     General: Skin is warm and dry. Neurological:      Mental Status: He is alert and oriented to person, place, and time. Mental status is at baseline. Deep Tendon Reflexes: Reflexes are normal and symmetric. Reflexes normal.   Psychiatric:         Behavior: Behavior normal.         Thought Content:  Thought content normal.         Judgment: Judgment normal.       Amrik Segura MD

## 2023-10-02 DIAGNOSIS — F41.9 ANXIETY: ICD-10-CM

## 2023-10-02 RX ORDER — ESCITALOPRAM OXALATE 10 MG/1
TABLET ORAL
Qty: 90 TABLET | Refills: 2 | Status: SHIPPED | OUTPATIENT
Start: 2023-10-02

## 2023-10-14 ENCOUNTER — APPOINTMENT (OUTPATIENT)
Dept: LAB | Age: 78
End: 2023-10-14
Payer: MEDICARE

## 2023-10-14 DIAGNOSIS — Z79.899 ENCOUNTER FOR LONG-TERM (CURRENT) USE OF OTHER MEDICATIONS: ICD-10-CM

## 2023-10-14 DIAGNOSIS — I10 HYPERTENSION, ESSENTIAL: ICD-10-CM

## 2023-10-14 DIAGNOSIS — E78.00 HYPERCHOLESTEREMIA: ICD-10-CM

## 2023-10-14 LAB
ALBUMIN SERPL BCP-MCNC: 3.7 G/DL (ref 3.5–5)
ALP SERPL-CCNC: 64 U/L (ref 34–104)
ALT SERPL W P-5'-P-CCNC: 23 U/L (ref 7–52)
ANION GAP SERPL CALCULATED.3IONS-SCNC: 9 MMOL/L
AST SERPL W P-5'-P-CCNC: 31 U/L (ref 13–39)
BILIRUB SERPL-MCNC: 0.66 MG/DL (ref 0.2–1)
BUN SERPL-MCNC: 16 MG/DL (ref 5–25)
CALCIUM SERPL-MCNC: 9.6 MG/DL (ref 8.4–10.2)
CHLORIDE SERPL-SCNC: 106 MMOL/L (ref 96–108)
CO2 SERPL-SCNC: 27 MMOL/L (ref 21–32)
CREAT SERPL-MCNC: 0.97 MG/DL (ref 0.6–1.3)
GFR SERPL CREATININE-BSD FRML MDRD: 73 ML/MIN/1.73SQ M
GLUCOSE P FAST SERPL-MCNC: 115 MG/DL (ref 65–99)
LDLC SERPL DIRECT ASSAY-MCNC: 78 MG/DL (ref 0–100)
POTASSIUM SERPL-SCNC: 3.6 MMOL/L (ref 3.5–5.3)
PROT SERPL-MCNC: 6.9 G/DL (ref 6.4–8.4)
SODIUM SERPL-SCNC: 142 MMOL/L (ref 135–147)

## 2023-10-14 PROCEDURE — 80053 COMPREHEN METABOLIC PANEL: CPT

## 2023-10-14 PROCEDURE — 36415 COLL VENOUS BLD VENIPUNCTURE: CPT

## 2023-10-14 PROCEDURE — 83721 ASSAY OF BLOOD LIPOPROTEIN: CPT

## 2023-10-14 PROCEDURE — 83695 ASSAY OF LIPOPROTEIN(A): CPT

## 2023-10-16 LAB — LPA SERPL-SCNC: 133.3 NMOL/L

## 2023-11-09 ENCOUNTER — RA CDI HCC (OUTPATIENT)
Dept: OTHER | Facility: HOSPITAL | Age: 78
End: 2023-11-09

## 2023-11-12 ENCOUNTER — APPOINTMENT (OUTPATIENT)
Dept: LAB | Age: 78
End: 2023-11-12
Payer: MEDICARE

## 2023-11-12 DIAGNOSIS — R73.09 ABNORMAL GLUCOSE: ICD-10-CM

## 2023-11-12 DIAGNOSIS — E78.00 HYPERCHOLESTEROLEMIA: ICD-10-CM

## 2023-11-12 DIAGNOSIS — C61 PROSTATE CANCER (HCC): ICD-10-CM

## 2023-11-12 LAB
ALBUMIN SERPL BCP-MCNC: 4 G/DL (ref 3.5–5)
ALP SERPL-CCNC: 68 U/L (ref 34–104)
ALT SERPL W P-5'-P-CCNC: 25 U/L (ref 7–52)
ANION GAP SERPL CALCULATED.3IONS-SCNC: 7 MMOL/L
AST SERPL W P-5'-P-CCNC: 31 U/L (ref 13–39)
BILIRUB SERPL-MCNC: 0.75 MG/DL (ref 0.2–1)
BUN SERPL-MCNC: 17 MG/DL (ref 5–25)
CALCIUM SERPL-MCNC: 9.8 MG/DL (ref 8.4–10.2)
CHLORIDE SERPL-SCNC: 105 MMOL/L (ref 96–108)
CHOLEST SERPL-MCNC: 144 MG/DL
CO2 SERPL-SCNC: 29 MMOL/L (ref 21–32)
CREAT SERPL-MCNC: 1.01 MG/DL (ref 0.6–1.3)
GFR SERPL CREATININE-BSD FRML MDRD: 69 ML/MIN/1.73SQ M
GLUCOSE P FAST SERPL-MCNC: 111 MG/DL (ref 65–99)
HDLC SERPL-MCNC: 32 MG/DL
LDLC SERPL CALC-MCNC: 69 MG/DL (ref 0–100)
NONHDLC SERPL-MCNC: 112 MG/DL
POTASSIUM SERPL-SCNC: 3.7 MMOL/L (ref 3.5–5.3)
PROT SERPL-MCNC: 7.3 G/DL (ref 6.4–8.4)
PSA SERPL-MCNC: 0.08 NG/ML (ref 0–4)
SODIUM SERPL-SCNC: 141 MMOL/L (ref 135–147)
TRIGL SERPL-MCNC: 217 MG/DL

## 2023-11-12 PROCEDURE — 80053 COMPREHEN METABOLIC PANEL: CPT

## 2023-11-12 PROCEDURE — 80061 LIPID PANEL: CPT

## 2023-11-12 PROCEDURE — 36415 COLL VENOUS BLD VENIPUNCTURE: CPT

## 2023-11-12 PROCEDURE — 84153 ASSAY OF PSA TOTAL: CPT

## 2023-11-15 ENCOUNTER — OFFICE VISIT (OUTPATIENT)
Dept: INTERNAL MEDICINE CLINIC | Facility: CLINIC | Age: 78
End: 2023-11-15
Payer: MEDICARE

## 2023-11-15 VITALS
OXYGEN SATURATION: 94 % | HEART RATE: 93 BPM | WEIGHT: 204.8 LBS | TEMPERATURE: 96.8 F | SYSTOLIC BLOOD PRESSURE: 130 MMHG | HEIGHT: 70 IN | DIASTOLIC BLOOD PRESSURE: 74 MMHG | BODY MASS INDEX: 29.32 KG/M2

## 2023-11-15 DIAGNOSIS — I10 ESSENTIAL HYPERTENSION: ICD-10-CM

## 2023-11-15 DIAGNOSIS — R06.02 SOB (SHORTNESS OF BREATH): Primary | ICD-10-CM

## 2023-11-15 DIAGNOSIS — I25.10 CAD IN NATIVE ARTERY: ICD-10-CM

## 2023-11-15 DIAGNOSIS — N18.2 STAGE 2 CHRONIC KIDNEY DISEASE: ICD-10-CM

## 2023-11-15 DIAGNOSIS — E78.1 HYPERTRIGLYCERIDEMIA: ICD-10-CM

## 2023-11-15 DIAGNOSIS — Z13.29 SCREENING FOR HYPOTHYROIDISM: ICD-10-CM

## 2023-11-15 DIAGNOSIS — E78.00 HYPERCHOLESTEROLEMIA: ICD-10-CM

## 2023-11-15 DIAGNOSIS — R73.09 ABNORMAL GLUCOSE: ICD-10-CM

## 2023-11-15 DIAGNOSIS — I47.10 SUPRAVENTRICULAR TACHYCARDIA: ICD-10-CM

## 2023-11-15 PROCEDURE — 99214 OFFICE O/P EST MOD 30 MIN: CPT | Performed by: INTERNAL MEDICINE

## 2023-11-15 RX ORDER — ALBUTEROL SULFATE 90 UG/1
2 AEROSOL, METERED RESPIRATORY (INHALATION) EVERY 6 HOURS PRN
COMMUNITY
Start: 2023-10-17

## 2023-11-15 RX ORDER — CANDESARTAN 8 MG/1
TABLET ORAL
COMMUNITY
Start: 2023-10-02

## 2023-11-15 RX ORDER — AMLODIPINE BESYLATE 5 MG/1
TABLET ORAL
COMMUNITY
Start: 2023-10-17

## 2023-11-15 RX ORDER — DILTIAZEM HYDROCHLORIDE 240 MG/1
CAPSULE, COATED, EXTENDED RELEASE ORAL
COMMUNITY
Start: 2023-11-14

## 2023-11-15 NOTE — ASSESSMENT & PLAN NOTE
Triglyceride readings reviewed with the patient today recommend continued care and consumption of concentrated sugars and carbohydrates and weight reduction.

## 2023-11-15 NOTE — ASSESSMENT & PLAN NOTE
Blood sugar reviewed with the patient recommend continued care and consumption of carbohydrates and concentrated sugars. Advised weight loss to reduce BMI to 25. Reduction of total calorie consumption on a daily basis along with regular physical exercise recommended for weight reduction.

## 2023-11-15 NOTE — ASSESSMENT & PLAN NOTE
Blood pressure assessment today shows a reading of 130/74 recommend continuation of card is I am at 240 mg daily along with Atacand 8 mg daily. .  We reviewed his most recent comprehensive metabolic profile pertaining to electrolyte balance and kidney performance.

## 2023-11-15 NOTE — PROGRESS NOTES
Name: Ju Weller. : 1945      MRN: [id number]  Encounter Provider: Tomeka Mcleod MD  Encounter Date: 11/15/2023   Encounter department: [address] [place] [address]     1. Abnormal glucose  Assessment & Plan:  Blood sugar reviewed with the patient recommend continued care and consumption of carbohydrates and concentrated sugars. Advised weight loss to reduce BMI to 25. Reduction of total calorie consumption on a daily basis along with regular physical exercise recommended for weight reduction. Orders:  -     Comprehensive metabolic panel; Future    2. Hypercholesterolemia  Assessment & Plan:  Cholesterol profile reviewed with the patient during today's visit recommend continuation of current lipid medication and a low-cholesterol diet. Follow-up in 4 months advised    Orders:  -     Lipid panel; Future    3. Screening for hypothyroidism  -     T4, free; Future  -     TSH, 3rd generation; Future    4. Essential hypertension  Assessment & Plan:  Blood pressure assessment today shows a reading of 130/74 recommend continuation of card is I am at 240 mg daily along with Atacand 8 mg daily. .  We reviewed his most recent comprehensive metabolic profile pertaining to electrolyte balance and kidney performance. 5. CAD in native artery  Assessment & Plan:  Since recent visit with his cardiologist was reviewed in detail during today's visit including his adjustment to blood pressure medication as well as recent stress test.  No evidence of reversible ischemia on stress testing. Pressure control seems to be improving since Cardizem was increased to 240 mg daily. 6. Supraventricular tachycardia    7.  Stage 2 chronic kidney disease  Assessment & Plan:  Lab Results   Component Value Date    EGFR 69 2023    EGFR 73 10/14/2023    EGFR 56 2022    CREATININE 1.01 2023    CREATININE 0.97 10/14/2023    CREATININE 1.21 2022   Current GFR is 69 cc/min slightly down from last blood work 4 months ago recommend continued surveillance of chronic kidney disease now stage II avoid nephrotoxins and dehydration. Work on continued improvement in blood pressure control. 8. Hypertriglyceridemia  Assessment & Plan:  Triglyceride readings reviewed with the patient today recommend continued care and consumption of concentrated sugars and carbohydrates and weight reduction. 9. SOB (shortness of breath)  Assessment & Plan:  Patient experiencing persistent shortness of breath symptoms stress test does not show any evidence of reversible ischemia. A high definition CT scan of the chest has been requested by the patient's cardiologist.  Munira Cleverly believe that some of his shortness of breath may represent being overweight and being out of physical shape and conditioning. Consideration for physical therapy/cardiac rehab             Subjective      This 78year-old gentleman returns to our office today for routine follow-up visit. Today's visit with the patient we reviewed his recent visit with his cardiologist which included a stress test.  There was no convincing evidence of reversible ischemia on the stress test.  The patient's diltiazem medication was increased from 122 240 mg by his cardiologist in response to elevated blood pressure during his visit recently. Patient denies any palpitations or chest discomfort recently. We have also reviewed with the patient today his most recent blood work pertaining to a comprehensive metabolic profile as well as lipid profile. The patient is concerned about his weight gain. It appears that his weight has increased almost 10 pounds over the last 3 months. We do recommend that he work on reducing his consumption of concentrated sugars as well as carbohydrates.   He does remain physically rather active during the summer months but as he gets into the winter months his activities seem to decrease as he has no outdoor work to do in his yard. Stressed the importance of trying to maintain some form of exercise through the winter months. Review of Systems   All other systems reviewed and are negative. Current Outpatient Medications on File Prior to Visit   Medication Sig    acetaminophen (TYLENOL) 325 mg tablet Take 2 tablets (650 mg total) by mouth every 4 (four) hours as needed for mild pain    albuterol (PROVENTIL HFA,VENTOLIN HFA) 90 mcg/act inhaler Inhale 2 puffs every 6 (six) hours as needed    amLODIPine (NORVASC) 5 mg tablet TAKE 1 TAB WHEN  OR ABOVE    aspirin 81 MG tablet Take 81 mg by mouth    candesartan (ATACAND) 8 MG tablet     diltiazem (CARDIZEM CD) 240 mg 24 hr capsule     escitalopram (LEXAPRO) 10 mg tablet TAKE 1 TABLET BY MOUTH  DAILY    Multiple Vitamins-Minerals (CENTRUM SILVER PO) Take by mouth    Nexlizet 180-10 MG TABS     nitroglycerin (NITROSTAT) 0.4 mg SL tablet Place 1 tablet under the tongue every 5 (five) minutes as needed    Omega-3 Fatty Acids (FISH OIL) 1200 MG CAPS Take by mouth    oxybutynin (DITROPAN) 5 mg tablet TAKE 1 TABLET BY MOUTH AT BEDTIME    tamsulosin (FLOMAX) 0.4 mg TAKE 1 CAPSULE BY MOUTH  DAILY WITH DINNER    terazosin (HYTRIN) 2 mg capsule     warfarin (COUMADIN) 5 mg tablet Take 5 mg by mouth    [DISCONTINUED] candesartan (ATACAND) 16 mg tablet     [DISCONTINUED] diltiazem (CARDIZEM CD) 120 mg 24 hr capsule  (Patient not taking: Reported on 11/15/2023)       Objective     /74   Pulse 93   Temp (!) 96.8 °F (36 °C)   Ht 5' 10" (1.778 m)   Wt 92.9 kg (204 lb 12.8 oz)   SpO2 94%   BMI 29.39 kg/m²     Physical Exam  Constitutional:       General: He is not in acute distress. Appearance: Normal appearance. He is not ill-appearing. HENT:      Head: Normocephalic. Eyes:      Pupils: Pupils are equal, round, and reactive to light. Cardiovascular:      Rate and Rhythm: Regular rhythm. Pulmonary:      Breath sounds: No wheezing, rhonchi or rales. Musculoskeletal:      Right lower leg: No edema. Left lower leg: No edema. Neurological:      Mental Status: He is alert. Mental status is at baseline. Psychiatric:         Mood and Affect: Mood normal.         Behavior: Behavior normal.         Thought Content:  Thought content normal.         Judgment: Judgment normal.       Omega Wick MD

## 2023-11-15 NOTE — ASSESSMENT & PLAN NOTE
Lab Results   Component Value Date    EGFR 69 11/12/2023    EGFR 73 10/14/2023    EGFR 56 07/16/2022    CREATININE 1.01 11/12/2023    CREATININE 0.97 10/14/2023    CREATININE 1.21 07/16/2022   Current GFR is 69 cc/min slightly down from last blood work 4 months ago recommend continued surveillance of chronic kidney disease now stage II avoid nephrotoxins and dehydration. Work on continued improvement in blood pressure control.

## 2023-11-15 NOTE — ASSESSMENT & PLAN NOTE
Since recent visit with his cardiologist was reviewed in detail during today's visit including his adjustment to blood pressure medication as well as recent stress test.  No evidence of reversible ischemia on stress testing. Pressure control seems to be improving since Cardizem was increased to 240 mg daily.

## 2023-11-15 NOTE — ASSESSMENT & PLAN NOTE
Patient experiencing persistent shortness of breath symptoms stress test does not show any evidence of reversible ischemia. A high definition CT scan of the chest has been requested by the patient's cardiologist.  Ghazala Carreno believe that some of his shortness of breath may represent being overweight and being out of physical shape and conditioning.   Consideration for physical therapy/cardiac rehab

## 2023-11-29 ENCOUNTER — OFFICE VISIT (OUTPATIENT)
Dept: UROLOGY | Facility: CLINIC | Age: 78
End: 2023-11-29
Payer: MEDICARE

## 2023-11-29 VITALS
HEART RATE: 72 BPM | SYSTOLIC BLOOD PRESSURE: 160 MMHG | BODY MASS INDEX: 28.63 KG/M2 | OXYGEN SATURATION: 96 % | WEIGHT: 200 LBS | DIASTOLIC BLOOD PRESSURE: 80 MMHG | HEIGHT: 70 IN

## 2023-11-29 DIAGNOSIS — C61 PROSTATE CANCER (HCC): Primary | ICD-10-CM

## 2023-11-29 PROCEDURE — 99213 OFFICE O/P EST LOW 20 MIN: CPT | Performed by: PHYSICIAN ASSISTANT

## 2023-12-13 ENCOUNTER — CLINICAL SUPPORT (OUTPATIENT)
Dept: RADIATION ONCOLOGY | Facility: HOSPITAL | Age: 78
End: 2023-12-13
Attending: STUDENT IN AN ORGANIZED HEALTH CARE EDUCATION/TRAINING PROGRAM
Payer: MEDICARE

## 2023-12-13 ENCOUNTER — TELEPHONE (OUTPATIENT)
Dept: RADIATION ONCOLOGY | Facility: HOSPITAL | Age: 78
End: 2023-12-13

## 2023-12-13 VITALS
TEMPERATURE: 97.9 F | RESPIRATION RATE: 18 BRPM | DIASTOLIC BLOOD PRESSURE: 78 MMHG | HEIGHT: 70 IN | OXYGEN SATURATION: 96 % | BODY MASS INDEX: 28.7 KG/M2 | HEART RATE: 83 BPM | SYSTOLIC BLOOD PRESSURE: 144 MMHG

## 2023-12-13 DIAGNOSIS — C61 PROSTATE CANCER (HCC): ICD-10-CM

## 2023-12-13 DIAGNOSIS — C61 PROSTATE CANCER (HCC): Primary | ICD-10-CM

## 2023-12-13 PROCEDURE — 99215 OFFICE O/P EST HI 40 MIN: CPT | Performed by: STUDENT IN AN ORGANIZED HEALTH CARE EDUCATION/TRAINING PROGRAM

## 2023-12-13 PROCEDURE — 99211 OFF/OP EST MAY X REQ PHY/QHP: CPT | Performed by: STUDENT IN AN ORGANIZED HEALTH CARE EDUCATION/TRAINING PROGRAM

## 2023-12-13 NOTE — PROGRESS NOTES
Luis Breaux. 1945 is a 78 y.o. male who returns for complex follow-up for GS 7 (4+3) prostate cancer. Initial consult on 1/4/23. prior history of TURP and severe LUTS with recently diagnosed unfavorable intermediate risk (cT1c, PSA 13.6, GS 4+3) prostate adenocarcinoma. Consult on 1/4/23 for consideration of definitive RT. The patient started ADT/Lupron in December 2022 and was planned for urolift in 2/2023. After healing, recommended that he return for CT simulation followed by delivery of RT. Summary  - Recommend either moderately hypofractionated RT or conventionally fractionated RT, depending on severity of symptoms following urolift. - Consider spaceOAR and fiducials. - Will need at least 6 months of ADT. 2/10/23 UroLift implantation     7/12/23 Urology follow-up   He agreed to ADT but has declined definitive radiation so far. He did have a consultation previously. Current PSA is 0.11. Plan:  - Lupron 45 mg given today  - He is considering proceeding with definitive radiation in fall-  - Follow-up in 4 months with a PSA prior to visit and if agreeable will refer him back to radiation oncology and arrange for SpaceOAR and gold fiducial markers  -He could conceivably continue on ADT  -Encourage him to proceed with definitive radiation       PSA, Total   Latest Ref Rng 0.00 - 4.00 ng/mL   8/4/2019 1.4    9/5/2021 2.6    1/5/2022 3.4    4/30/2022 5.0 (H)    3/10/2023 0.4    11/12/2023 0.08       Oncology History   Prostate cancer ([address])   2022 Initial Diagnosis    Prostate cancer ([address])     11/21/2022 Biopsy    A. Prostate, left lateral base:  - Benign prostate tissue. B. Prostate, left lateral mid:  - Benign prostate tissue. C. Prostate, left lateral apex:  - Focal high grade prostatic intraepithelial neoplasia (HGPIN), see comment.      Comment: Immunohistochemistry for a prostate multiplex stain (p63, K903, and P504S) demonstrates HGPIN and no evidence of invasive prostatic carcinoma. D. Prostate, left base:  - Focal high grade prostatic intraepithelial neoplasia (HGPIN), see comment. Comment: Immunohistochemistry for a prostate multiplex stain (p63, K903, and P504S) demonstrates HGPIN and no evidence of invasive prostatic carcinoma. E. Prostate, left mid:  - Benign prostate tissue with focal chronic inflammation. F. Prostate, left apex:  -Prostatic adenocarcinoma, [place] score 3 + 3 = 6, Prognostic Grade Group 1,  involving 20% of 1 needle core  and measuring  3 mm in length, see comment. Comment: Immunohistochemistry for a prostate multiplex stain (p63, K903, and P504S) demonstrates presence of invasive prostatic adenocarcinoma. G. Prostate, right lateral base:  -Prostatic adenocarcinoma, Jason score 3 + 4 = 7, Prognostic Grade Group 2,  involving 75% of 1 needle core  and measuring  9 mm in length, see comment. Pattern 4 ~5-10% of the tumor (ill-defined glands)  - Additional Pathologic Findings: HGPIN     Comment: Immunohistochemistry for a prostate multiplex stain (p63, K903, and P504S) demonstrates presence of invasive prostatic adenocarcinoma and HGPIN. H. Prostate, right lateral mid:  - Small atypical acinar proliferation (ASAP) and high grade prostatic intraepithelial neoplasia (HGPIN), see comment. Comment: Immunohistochemistry for a prostate multiplex stain (p63, K903, and P504S) demonstrates HGPIN and no definitive evidence of invasive prostatic carcinoma. I. Prostate, right lateral apex:  - High grade prostatic intraepithelial neoplasia (HGPIN). J. Prostate, right base:  -Prostatic adenocarcinoma, [place] score 4 + 3 = 7, Prognostic Grade Group 3,  involving 70% of 1 needle core  and measuring  9 mm in length, see comment. Pattern 4 comprised of glomeruloid pattern, ill-defined and fused glands.   - Additional Pathologic Findings: HGPIN     Comment: Immunohistochemistry for a prostate multiplex stain (p63, K903, and P504S) demonstrates presence of invasive prostatic adenocarcinoma and HGPIN. K. Prostate, right mid:  -Prostatic adenocarcinoma, Jason score 3 + 4 = 7, Prognostic Grade Group 2,  involving 60% of 1 needle core  and measuring  8 mm in length, see comment. Pattern 4 ~10% comprised of ill-defined and fused glands  - Additional Pathologic Findings: HGPIN     L. Prostate, right base:  - High grade prostatic intraepithelial neoplasia (HGPIN). 12/21/2022 -  Hormone Therapy    Lupron injection          Review of Systems:  Review of Systems   Constitutional: Negative. HENT: Negative. Eyes: Negative. Respiratory:  Positive for shortness of breath (With exertion). Cardiovascular: Negative. Gastrointestinal: Negative. Endocrine: Negative. Genitourinary:  Positive for urgency. Negative for difficulty urinating, dysuria and hematuria. Daily leakage   Musculoskeletal: Negative. Skin: Negative. Allergic/Immunologic: Negative. Neurological: Negative. Hematological: Negative. Psychiatric/Behavioral: Negative. Clinical Trial: no    IPSS Questionnaire (AUA-7): Over the past month…    1)  How often have you had a sensation of not emptying your bladder completely after you finish urinating? 0 - Not at all   2)  How often have you had to urinate again less than two hours after you finished urinating? 2 - Less than half the time   3)  How often have you found you stopped and started again several times when you urinated? 0 - Not at all   4) How difficult have you found it to postpone urination? 0 - Not at all   5) How often have you had a weak urinary stream?  3 - About half the time   6) How often have you had to push or strain to begin urination? 0 - Not at all   7) How many times did you most typically get up to urinate from the time you went to bed until the time you got up in the morning?   2 - 2 times   Total Score:  7 Health Maintenance   Topic Date Due    Medicare Annual Wellness Visit (AWV)  05/17/2023    COVID-19 Vaccine (5 - 2023-24 season) 09/01/2023    BMI: Followup Plan  01/19/2024    Fall Risk  07/26/2024    BMI: Adult  11/29/2024    Depression Screening  12/13/2024    Hepatitis C Screening  Completed    Pneumococcal Vaccine: 65+ Years  Completed    Influenza Vaccine  Completed    HIB Vaccine  Aged Out    IPV Vaccine  Aged Out    Hepatitis A Vaccine  Aged Out    Meningococcal ACWY Vaccine  Aged Out    HPV Vaccine  Aged Out     Patient Active Problem List   Diagnosis    Essential hypertension    Abnormal glucose    Anticoagulant long-term use    CAD in native artery    ED (erectile dysfunction)    Hypercholesterolemia    Hypertriglyceridemia    Lumbar canal stenosis    TIA (transient ischemic attack)    Over weight    Supraventricular tachycardia    SOB (shortness of breath)    Anxiety    Snoring    SKYLER (obstructive sleep apnea)    Central apnea    Myalgia    Elevated liver enzymes    Stage 2 chronic kidney disease    Prostate cancer ([address])    Benign prostatic hyperplasia with nocturia     Past Medical History:   Diagnosis Date    Elevated liver enzymes     Hypertension     Prostate cancer ([address])     TIA (transient ischemic attack)      Past Surgical History:   Procedure Laterality Date    BACK SURGERY      CARDIAC SURGERY      IN CYSTO INSERTION TRANSPROSTATIC IMPLANT SINGLE N/A 02/10/2023    Procedure: CYSTOSCOPY WITH INSERTION [place];  Surgeon: Leonie Mccoy MD;  [facility];  Service: Urology    TRANSURETHRAL RESECTION OF PROSTATE      Approx.  15 years prior     Family History   Problem Relation Age of Onset    Heart failure Father     Heart failure Mother     Stroke Brother     Colon cancer Son     No Known Problems Daughter     No Known Problems Daughter      Social History     Socioeconomic History    Marital status: /Civil Union     Spouse name: Not on file    Number of children: Not on file    Years of education: Not on file    Highest education level: Not on file   Occupational History    Not on file   Tobacco Use    Smoking status: Never    Smokeless tobacco: Never   Vaping Use    Vaping status: Never Used   Substance and Sexual Activity    Alcohol use: Never    Drug use: Never    Sexual activity: Not Currently   Other Topics Concern    Not on file   Social History Narrative    Not on file     Social Determinants of Health     Financial Resource Strain: Not on file   Food Insecurity: Not on file   Transportation Needs: Not on file   Physical Activity: Not on file   Stress: Not on file   Social Connections: Not on file   Intimate Partner Violence: Not on file   Housing Stability: Not on file       Current Outpatient Medications:     acetaminophen (TYLENOL) 325 mg tablet, Take 2 tablets (650 mg total) by mouth every 4 (four) hours as needed for mild pain, Disp: 30 tablet, Rfl: 0    albuterol (PROVENTIL HFA,VENTOLIN HFA) 90 mcg/act inhaler, Inhale 2 puffs every 6 (six) hours as needed, Disp: , Rfl:     amLODIPine (NORVASC) 5 mg tablet, TAKE 1 TAB WHEN  OR ABOVE, Disp: , Rfl:     aspirin 81 MG tablet, Take 81 mg by mouth, Disp: , Rfl:     candesartan (ATACAND) 8 MG tablet, , Disp: , Rfl:     diltiazem (CARDIZEM CD) 240 mg 24 hr capsule, , Disp: , Rfl:     escitalopram (LEXAPRO) 10 mg tablet, TAKE 1 TABLET BY MOUTH  DAILY, Disp: 90 tablet, Rfl: 2    Multiple Vitamins-Minerals (CENTRUM SILVER PO), Take by mouth, Disp: , Rfl:     Nexlizet 180-10 MG TABS, , Disp: , Rfl:     nitroglycerin (NITROSTAT) 0.4 mg SL tablet, Place 1 tablet under the tongue every 5 (five) minutes as needed, Disp: , Rfl:     Omega-3 Fatty Acids (FISH OIL) 1200 MG CAPS, Take by mouth, Disp: , Rfl:     oxybutynin (DITROPAN) 5 mg tablet, TAKE 1 TABLET BY MOUTH AT BEDTIME, Disp: 90 tablet, Rfl: 3    tamsulosin (FLOMAX) 0.4 mg, TAKE 1 CAPSULE BY MOUTH  DAILY WITH DINNER, Disp: 90 capsule, Rfl: 3    terazosin (HYTRIN) 2 mg capsule, , Disp: , Rfl:     warfarin (COUMADIN) 5 mg tablet, Take 5 mg by mouth, Disp: , Rfl:   Allergies   Allergen Reactions    Statins Other (See Comments)     Elevated CPK     Vitals:    12/13/23 1053   BP: 144/78   BP Location: Left arm   Patient Position: Sitting   Cuff Size: Standard   Pulse: 83   Resp: 18   Temp: 97.9 °F (36.6 °C)   TempSrc: Temporal   SpO2: 96%   Height: 5' 10" (1.778 m)      Pain Score: 0-No pain

## 2023-12-13 NOTE — TELEPHONE ENCOUNTER
Dm Groves was seen by Dr. Morton today. He would like to move forward with RT. He will require space OAR and fiducial marker placement. Dr. Morton would also like for him to receive a 3rd injection of Lupron in January.   Thank you,  Tori

## 2023-12-14 NOTE — PROGRESS NOTES
Follow-up - Radiation Oncology   Hemant Enamorado. 1945 78 y.o. male [id number]      History of Present Illness   Cancer Staging   No matching staging information was found for the patient. Mr. Radames Amin is an 78year old man with unfavorable intermediate risk (cT1c, GS 4+3, PSA 13.6) prostate adenocarcinoma. The patient was initially seen in consultation on 1/4/23, at that time having started ADT and with plan for urolift followed by possible definitive RT. Interval History:  Since then the patient has continued to follow with urology and underwent urolift implantation on 2/10/23. He was last seen in clinic with urology on 7/12/23, continuing on Lupron. Currently he is doing well fairly well overall. He unfortunately does not feel his urolift provided significant QoL improvement with regard to his urinary function. Despite this his IPSS today does appear improved as compared to his initial consultation (decreased from 15 to 7). He has ongoing frequency and urgency of urination as well as a weakened stream and intermittent leakage. PSA Trend:     PSA, Total   Latest Ref Rng 0.00 - 4.00 ng/mL   8/4/2019 1.4    9/5/2021 2.6    1/5/2022 3.4    4/30/2022 5.0 (H)    3/10/2023 0.4    11/12/2023 0.08        Historical Information   Oncology History   Prostate cancer ([address] [facility])   2022 Initial Diagnosis    Prostate cancer ([address] [facility])     11/21/2022 Biopsy    A. Prostate, left lateral base:  - Benign prostate tissue. B. Prostate, left lateral mid:  - Benign prostate tissue. C. Prostate, left lateral apex:  - Focal high grade prostatic intraepithelial neoplasia (HGPIN), see comment. Comment: Immunohistochemistry for a prostate multiplex stain (p63, K903, and P504S) demonstrates HGPIN and no evidence of invasive prostatic carcinoma. D. Prostate, left base:  - Focal high grade prostatic intraepithelial neoplasia (HGPIN), see comment.      Comment: Immunohistochemistry for a prostate multiplex stain (p63, K903, and P504S) demonstrates HGPIN and no evidence of invasive prostatic carcinoma. E. Prostate, left mid:  - Benign prostate tissue with focal chronic inflammation. F. Prostate, left apex:  -Prostatic adenocarcinoma, Arg[place] score 3 + 3 = 6, Prognostic Grade Group 1,  involving 20% of 1 needle core  and measuring  3 mm in length, see comment. Comment: Immunohistochemistry for a prostate multiplex stain (p63, K903, and P504S) demonstrates presence of invasive prostatic adenocarcinoma. G. Prostate, right lateral base:  -Prostatic adenocarcinoma, Argyle score 3 + 4 = 7, Prognostic Grade Group 2,  involving 75% of 1 needle core  and measuring  9 mm in length, see comment. Pattern 4 ~5-10% of the tumor (ill-defined glands)  - Additional Pathologic Findings: HGPIN     Comment: Immunohistochemistry for a prostate multiplex stain (p63, K903, and P504S) demonstrates presence of invasive prostatic adenocarcinoma and HGPIN. H. Prostate, right lateral mid:  - Small atypical acinar proliferation (ASAP) and high grade prostatic intraepithelial neoplasia (HGPIN), see comment. Comment: Immunohistochemistry for a prostate multiplex stain (p63, K903, and P504S) demonstrates HGPIN and no definitive evidence of invasive prostatic carcinoma. I. Prostate, right lateral apex:  - High grade prostatic intraepithelial neoplasia (HGPIN). J. Prostate, right base:  -Prostatic adenocarcinoma, Jason score 4 + 3 = 7, Prognostic Grade Group 3,  involving 70% of 1 needle core  and measuring  9 mm in length, see comment. Pattern 4 comprised of glomeruloid pattern, ill-defined and fused glands. - Additional Pathologic Findings: HGPIN     Comment: Immunohistochemistry for a prostate multiplex stain (p63, K903, and P504S) demonstrates presence of invasive prostatic adenocarcinoma and HGPIN.      K. Prostate, right mid:  -Prostatic adenocarcinoma, Argyle score 3 + 4 = 7, Prognostic Grade Group 2,  involving 60% of 1 needle core  and measuring  8 mm in length, see comment. Pattern 4 ~10% comprised of ill-defined and fused glands  - Additional Pathologic Findings: HGPIN     L. Prostate, right base:  - High grade prostatic intraepithelial neoplasia (HGPIN). 12/21/2022 -  Hormone Therapy    Lupron injection          Past Medical History:   Diagnosis Date    Elevated liver enzymes     Hypertension     Prostate cancer ([address])     TIA (transient ischemic attack)      Past Surgical History:   Procedure Laterality Date    BACK SURGERY      CARDIAC SURGERY      IN CYSTO INSERTION TRANSPROSTATIC IMPLANT SINGLE N/A 02/10/2023    Procedure: Tigist Ringer WITH INSERTION [place];  Surgeon: Florencio Olmedo MD;  Location[facility];  Service: Urology    TRANSURETHRAL RESECTION OF PROSTATE      Approx.  15 years prior       Social History   Social History     Substance and Sexual Activity   Alcohol Use Never     Social History     Substance and Sexual Activity   Drug Use Never     Social History     Tobacco Use   Smoking Status Never   Smokeless Tobacco Never         Meds/Allergies     Current Outpatient Medications:     acetaminophen (TYLENOL) 325 mg tablet, Take 2 tablets (650 mg total) by mouth every 4 (four) hours as needed for mild pain, Disp: 30 tablet, Rfl: 0    albuterol (PROVENTIL HFA,VENTOLIN HFA) 90 mcg/act inhaler, Inhale 2 puffs every 6 (six) hours as needed, Disp: , Rfl:     amLODIPine (NORVASC) 5 mg tablet, TAKE 1 TAB WHEN  OR ABOVE, Disp: , Rfl:     aspirin 81 MG tablet, Take 81 mg by mouth, Disp: , Rfl:     candesartan (ATACAND) 8 MG tablet, , Disp: , Rfl:     diltiazem (CARDIZEM CD) 240 mg 24 hr capsule, , Disp: , Rfl:     escitalopram (LEXAPRO) 10 mg tablet, TAKE 1 TABLET BY MOUTH  DAILY, Disp: 90 tablet, Rfl: 2    Multiple Vitamins-Minerals (CENTRUM SILVER PO), Take by mouth, Disp: , Rfl:     Nexlizet 180-10 MG TABS, , Disp: , Rfl:     nitroglycerin (NITROSTAT) 0.4 mg SL tablet, Place 1 tablet under the tongue every 5 (five) minutes as needed, Disp: , Rfl:     Omega-3 Fatty Acids (FISH OIL) 1200 MG CAPS, Take by mouth, Disp: , Rfl:     oxybutynin (DITROPAN) 5 mg tablet, TAKE 1 TABLET BY MOUTH AT BEDTIME, Disp: 90 tablet, Rfl: 3    tamsulosin (FLOMAX) 0.4 mg, TAKE 1 CAPSULE BY MOUTH  DAILY WITH DINNER, Disp: 90 capsule, Rfl: 3    terazosin (HYTRIN) 2 mg capsule, , Disp: , Rfl:     warfarin (COUMADIN) 5 mg tablet, Take 5 mg by mouth, Disp: , Rfl:   Allergies   Allergen Reactions    Statins Other (See Comments)     Elevated CPK     Review of Systems   Constitutional: Negative. HENT: Negative. Eyes: Negative. Respiratory:  Positive for shortness of breath (With exertion). Cardiovascular: Negative. Gastrointestinal: Negative. Endocrine: Negative. Genitourinary:  Positive for urgency. Negative for difficulty urinating, dysuria and hematuria. Daily leakage   Musculoskeletal: Negative. Skin: Negative. Allergic/Immunologic: Negative. Neurological: Negative. Hematological: Negative. Psychiatric/Behavioral: Negative. IPSS Questionnaire (AUA-7): Over the past month…     1)  How often have you had a sensation of not emptying your bladder completely after you finish urinating? 0 - Not at all   2)  How often have you had to urinate again less than two hours after you finished urinating? 2 - Less than half the time   3)  How often have you found you stopped and started again several times when you urinated? 0 - Not at all   4) How difficult have you found it to postpone urination? 0 - Not at all   5) How often have you had a weak urinary stream?  3 - About half the time   6) How often have you had to push or strain to begin urination? 0 - Not at all   7) How many times did you most typically get up to urinate from the time you went to bed until the time you got up in the morning?   2 - 2 times   Total Score:  7 OBJECTIVE:   /78 (BP Location: Left arm, Patient Position: Sitting, Cuff Size: Standard)   Pulse 83   Temp 97.9 °F (36.6 °C) (Temporal)   Resp 18   Ht 5' 10" (1.778 m)   SpO2 96%   BMI 28.70 kg/m²   Pain Assessment:  0  ECOG/Zubrod/WHO: 1 - Symptomatic but completely ambulatory    Physical Exam   Well appearing. NAD. No increased work of breathing. Extremities warm and well perfused. VINOD deferred. RESULTS    Lab Results: No results found for this or any previous visit (from the past 672 hour(s)). Imaging Studies:CT chest wo contrast    Result Date: 11/18/2023  Narrative: History: Shortness of breath. Chest exposure history. Exam: CT of the chest.   Technique: Axial CT of the chest performed without contrast but with high-resolution imaging. Coronal and sagittal reformations were performed as well. Comparison: None. Findings: Vessels: There are atherosclerotic calcifications of the thoracic aorta and coronary arteries. Lungs/Pleura: There is right lower lobe linear opacity. There is left lower lobe subsegmental atelectasis with areas of groundglass opacity less prominent groundglass opacity in the lingula. . Mediastinum/Lymph nodes/Heart: Normal. Chest Wall: Normal.   Upper abdomen/Other: The visualized portions of the upper abdomen are within normal limits. Bones: Multiple median sternotomy wires are seen. Impression: Impression: 1. Left lower lobe subsegmental atelectasis with areas of groundglass opacity which are likely inflammatory or infectious. 2.  Right lower lobe linear opacity is most consistent with subsegmental atelectasis. 3.   CT examination performed with dose lowering protocol in accordance with ALARA. Workstation:[id number]          Assessment/Plan:  Orders Placed This Encounter   Procedures    Radiation Simulation Treatment      We again discussed in detail with the relative risks and benefits of proceeding with a course of definitive RT.  Specifically, we discussed that while he disease remains quite treatable, the benefit of treatment must be counterweighted against his competing mortality risk from his other medical conditions, most notably his underlying heart disease. At present, the patient remains quite active (working full time) and otherwise fit. As such I feel proceeding with definitive treatment is reasonable. We discussed the acute and late toxicities of prostate RT. Acute toxicities include, but are not limited to, increased urinary frequency, dysuria, urinary urgency, nocturia; increased frequency of bowel movements, diarrhea; fatigue; and radiation related skin changes. Late toxicities include, but are not limited to, radiation cystitis, radiation proctitis, decreased erectile function. Rarely patients may develop serious late consequences from RT including fistula formation of secondary cancers. The patient was given the opportunity to ask questions, all of which were answered to his satisfaction. After consideration the patient would like to proceed with treatment. He will undergo spaceOAR and fiducial marker placement. I will ask he undergo one additional 3 month lupron injection to carry him through completion of RT. I will plan to treat his prostate/SVs with a moderately hypofractionated course of therapy over ~28 fractions. I will remain available in the interim should any questions or concerns arise. Erin Means MD  12/14/2023,1:22 PM    Total time spent reviewing EMR, seeing patient in clinic visit, documenting visit, placing treatment orders, and communicating with other medical providers: 44 minutes. Portions of the record may have been created with voice recognition software. Occasional wrong word or "sound a like" substitutions may have occurred due to the inherent limitations of voice recognition software. Read the chart carefully and recognize, using context, where substitutions have occurred.

## 2023-12-15 ENCOUNTER — DOCUMENTATION (OUTPATIENT)
Dept: HEMATOLOGY ONCOLOGY | Facility: CLINIC | Age: 78
End: 2023-12-15

## 2023-12-15 DIAGNOSIS — C61 PROSTATE CANCER (HCC): Primary | ICD-10-CM

## 2023-12-15 NOTE — TELEPHONE ENCOUNTER
"See note from Dr Morton below:  \"Hi Kathie,     This patient has already had 1 year of Lupron (due to some delay in deciding to proceed with RT). I think a 3 month injection in January would be most appropriate just to given him overlap with his anticipated RT course.     Thanks,     Will\"    Please scheduled ADT for January. Last ADT injection was 7/12/23. Thank you  "

## 2023-12-15 NOTE — PROGRESS NOTES
Patient was seen by RAD ONC. Per Dr. Ivone Mederos patient is to get a 3 month ADT injection in January. Message sent to Urology office to schedule ADT. I will follow up on date in the near future.

## 2023-12-20 ENCOUNTER — DOCUMENTATION (OUTPATIENT)
Dept: HEMATOLOGY ONCOLOGY | Facility: CLINIC | Age: 78
End: 2023-12-20

## 2024-01-05 ENCOUNTER — DOCUMENTATION (OUTPATIENT)
Dept: HEMATOLOGY ONCOLOGY | Facility: CLINIC | Age: 79
End: 2024-01-05

## 2024-01-05 ENCOUNTER — APPOINTMENT (OUTPATIENT)
Dept: LAB | Age: 79
End: 2024-01-05
Payer: MEDICARE

## 2024-01-05 DIAGNOSIS — R39.89 SUSPECTED UTI: ICD-10-CM

## 2024-01-05 DIAGNOSIS — C61 MALIGNANT NEOPLASM OF PROSTATE (HCC): ICD-10-CM

## 2024-01-05 DIAGNOSIS — Z01.810 PRE-OPERATIVE CARDIOVASCULAR EXAMINATION: ICD-10-CM

## 2024-01-05 DIAGNOSIS — Z01.812 PRE-OPERATIVE LABORATORY EXAMINATION: ICD-10-CM

## 2024-01-05 LAB
ALBUMIN SERPL BCP-MCNC: 4 G/DL (ref 3.5–5)
ALP SERPL-CCNC: 73 U/L (ref 34–104)
ALT SERPL W P-5'-P-CCNC: 33 U/L (ref 7–52)
ANION GAP SERPL CALCULATED.3IONS-SCNC: 17 MMOL/L
AST SERPL W P-5'-P-CCNC: 41 U/L (ref 13–39)
BASOPHILS # BLD AUTO: 0.02 THOUSANDS/ÂΜL (ref 0–0.1)
BASOPHILS NFR BLD AUTO: 0 % (ref 0–1)
BILIRUB SERPL-MCNC: 0.49 MG/DL (ref 0.2–1)
BUN SERPL-MCNC: 12 MG/DL (ref 5–25)
CALCIUM SERPL-MCNC: 9.4 MG/DL (ref 8.4–10.2)
CHLORIDE SERPL-SCNC: 106 MMOL/L (ref 96–108)
CO2 SERPL-SCNC: 19 MMOL/L (ref 21–32)
CREAT SERPL-MCNC: 0.94 MG/DL (ref 0.6–1.3)
EOSINOPHIL # BLD AUTO: 0.15 THOUSAND/ÂΜL (ref 0–0.61)
EOSINOPHIL NFR BLD AUTO: 3 % (ref 0–6)
ERYTHROCYTE [DISTWIDTH] IN BLOOD BY AUTOMATED COUNT: 12.2 % (ref 11.6–15.1)
GFR SERPL CREATININE-BSD FRML MDRD: 76 ML/MIN/1.73SQ M
GLUCOSE P FAST SERPL-MCNC: 106 MG/DL (ref 65–99)
HCT VFR BLD AUTO: 40.4 % (ref 36.5–49.3)
HGB BLD-MCNC: 13.6 G/DL (ref 12–17)
IMM GRANULOCYTES # BLD AUTO: 0.02 THOUSAND/UL (ref 0–0.2)
IMM GRANULOCYTES NFR BLD AUTO: 0 % (ref 0–2)
LYMPHOCYTES # BLD AUTO: 1.78 THOUSANDS/ÂΜL (ref 0.6–4.47)
LYMPHOCYTES NFR BLD AUTO: 29 % (ref 14–44)
MCH RBC QN AUTO: 32.9 PG (ref 26.8–34.3)
MCHC RBC AUTO-ENTMCNC: 33.7 G/DL (ref 31.4–37.4)
MCV RBC AUTO: 98 FL (ref 82–98)
MONOCYTES # BLD AUTO: 0.49 THOUSAND/ÂΜL (ref 0.17–1.22)
MONOCYTES NFR BLD AUTO: 8 % (ref 4–12)
NEUTROPHILS # BLD AUTO: 3.63 THOUSANDS/ÂΜL (ref 1.85–7.62)
NEUTS SEG NFR BLD AUTO: 60 % (ref 43–75)
NRBC BLD AUTO-RTO: 0 /100 WBCS
PLATELET # BLD AUTO: 247 THOUSANDS/UL (ref 149–390)
PMV BLD AUTO: 11.2 FL (ref 8.9–12.7)
POTASSIUM SERPL-SCNC: 3.7 MMOL/L (ref 3.5–5.3)
PROT SERPL-MCNC: 6.9 G/DL (ref 6.4–8.4)
RBC # BLD AUTO: 4.13 MILLION/UL (ref 3.88–5.62)
SODIUM SERPL-SCNC: 142 MMOL/L (ref 135–147)
WBC # BLD AUTO: 6.09 THOUSAND/UL (ref 4.31–10.16)

## 2024-01-05 PROCEDURE — 87086 URINE CULTURE/COLONY COUNT: CPT

## 2024-01-05 PROCEDURE — 80053 COMPREHEN METABOLIC PANEL: CPT

## 2024-01-05 PROCEDURE — 36415 COLL VENOUS BLD VENIPUNCTURE: CPT

## 2024-01-05 PROCEDURE — 85025 COMPLETE CBC W/AUTO DIFF WBC: CPT

## 2024-01-05 NOTE — PROGRESS NOTES
Patient is scheduled for SpaceOAR/Markers on 1/10/24. Message sent to RAD [facility] to schedule SIM. I will follow up on date in the near future.

## 2024-01-05 NOTE — TELEPHONE ENCOUNTER
I spoke to the patient and scheduled his procedure for 1/10/2024 at [facility] with Dr. Hoang.    -instructions given verbally and mailed  -patient aware to be NPO, needs a  and use an enema 1 hour prior to leaving the house morning of procedure  -CBC, CMP, Urine C&S and EKG 2 weeks prior

## 2024-01-06 LAB
ATRIAL RATE: 65 BPM
BACTERIA UR CULT: NORMAL
P AXIS: 4 DEGREES
PR INTERVAL: 158 MS
QRS AXIS: 58 DEGREES
QRSD INTERVAL: 88 MS
QT INTERVAL: 390 MS
QTC INTERVAL: 405 MS
T WAVE AXIS: -36 DEGREES
VENTRICULAR RATE: 65 BPM

## 2024-01-08 NOTE — DISCHARGE INSTRUCTIONS
Vigorous oral fluid intake and limited activity for the next 24 to 48 hours.  Report any excessive bleeding difficulty urinating or fevers

## 2024-01-08 NOTE — TELEPHONE ENCOUNTER
Pt calling to inform SS that he has not received the instructions that were to be emailed to him.  Confirmed with pt his email address.  Pt also asking for clarification on location.    Please call pt back at [phone]

## 2024-01-08 NOTE — H&P
H&P Exam - Urology   Dm Hyatt Jr. 78 y.o. male MRN: [id number]  Unit/Bed#:  Encounter: [id number]    Assessment/Plan     Assessment:  Adenocarcinoma of the prostate-planning radiation therapy  Plan:  Transrectal ultrasound-guided transperineal percutaneous placement of gold fiducial markers (prostatic) and hydrogel SpaceOAR.    History of Present Illness   HPI:  Dm Hyatt Jr. is a 78 y.o. male who presents with adenocarcinoma of the prostate Jason pattern score 7 (4+3).  He has chosen to proceed with radiation therapy for active therapeutic intervention.  I met the patient in the holding area and discussed the proposed procedure with him step-by-step answering all patient questions performing history and physical and also discussing risks and complications of the procedure such as bleeding infection abscess rectal injury urinary retention potential need for Guerra catheter or operative intervention.  The patient expressed understanding and provided both verbal and signed informed consent..    Review of Systems    Historical Information   Past Medical History:   Diagnosis Date    Elevated liver enzymes     Hypertension     Prostate cancer (HCC)     TIA (transient ischemic attack)      Past Surgical History:   Procedure Laterality Date    BACK SURGERY      CARDIAC SURGERY      [place] CYSTO INSERTION TRANSPROSTATIC IMPLANT SINGLE N/A 02/10/2023    Procedure: CYSTOSCOPY WITH INSERTION UROLIFT;  Surgeon: Paul Castro MD;  Location: [facility] MAIN OR;  Service: Urology    TRANSURETHRAL RESECTION OF PROSTATE      Approx. 15 years prior     Social History   Social History     Substance and Sexual Activity   Alcohol Use Never     Social History     Substance and Sexual Activity   Drug Use Never     Social History     Tobacco Use   Smoking Status Never   Smokeless Tobacco Never     Family History:   Family History   Problem Relation Age of Onset    Heart failure Father     Heart failure Mother     Stroke  Brother     Colon cancer Son     No Known Problems Daughter     No Known Problems Daughter        Meds/Allergies   all medications and allergies reviewed  Allergies   Allergen Reactions    Statins Other (See Comments)     Elevated CPK       Objective   Vitals: There were no vitals taken for this visit.    No intake/output data recorded.    Invasive Devices       None                   Physical Exam  Vitals reviewed.   Constitutional:       General: He is not in acute distress.     Appearance: Normal appearance. He is not ill-appearing, toxic-appearing or diaphoretic.   HENT:      Head: Normocephalic and atraumatic.      Mouth/Throat:      Mouth: Mucous membranes are moist.   Eyes:      Extraocular Movements: Extraocular movements intact.   Cardiovascular:      Rate and Rhythm: Normal rate and regular rhythm.   Pulmonary:      Effort: Pulmonary effort is normal. No respiratory distress.      Breath sounds: Normal breath sounds. No wheezing, rhonchi or rales.   Abdominal:      General: There is no distension.      Palpations: Abdomen is soft.      Tenderness: There is no abdominal tenderness. There is no guarding or rebound.   Musculoskeletal:         General: Normal range of motion.      Cervical back: Neck supple.   Skin:     General: Skin is dry.   Neurological:      Mental Status: He is alert and oriented to person, place, and time.   Psychiatric:         Mood and Affect: Mood normal.         Behavior: Behavior normal.         Thought Content: Thought content normal.         Judgment: Judgment normal.         Lab Results: I have personally reviewed pertinent reports.    Imaging: I have personally reviewed pertinent reports.   and I have personally reviewed pertinent films in PACS  EKG, Pathology, and Other Studies: I have personally reviewed pertinent reports.   and I have personally reviewed pertinent films in PACS  VTE Prophylaxis: Sequential compression device (Venodyne)     Code Status: No Order  Advance  Directive and Living Will:      Power of :    POLST:      Counseling / Coordination of Care  Total floor / unit time spent today 30 minutes.  Greater than 50% of total time was spent with the patient and / or family counseling and / or coordination of care.  A description of the counseling / coordination of care:  .

## 2024-01-09 ENCOUNTER — ANESTHESIA EVENT (OUTPATIENT)
Dept: PERIOP | Facility: HOSPITAL | Age: 79
End: 2024-01-09
Payer: MEDICARE

## 2024-01-09 NOTE — ANESTHESIA PREPROCEDURE EVALUATION
Procedure:  INSERTION OF FIDUCIAL MARKER , SPACEAOR (Penis)    Relevant Problems   CARDIO   (+) CAD in native artery   (+) Essential hypertension   (+) Hypercholesterolemia   (+) Hypertriglyceridemia   (+) Supraventricular tachycardia      /RENAL   (+) Prostate cancer (HCC)   (+) Stage 2 chronic kidney disease      NEURO/PSYCH   (+) Anxiety   (+) TIA (transient ischemic attack)      PULMONARY   (+) SKYLER (obstructive sleep apnea)   (+) SOB (shortness of breath)      Other   (+) Anticoagulant long-term use   (+) Central apnea   (+) Snoring        Physical Exam    Airway    Mallampati score: III  TM Distance: >3 FB  Neck ROM: full     Dental    upper dentures    Cardiovascular  Cardiovascular exam normal    Pulmonary  Pulmonary exam normal     Other Findings        Anesthesia Plan  ASA Score- 3     Anesthesia Type- IV sedation with anesthesia with ASA Monitors.         Additional Monitors:     Airway Plan:            Plan Factors-Exercise tolerance (METS): >4 METS.    Chart reviewed. EKG reviewed.  Existing labs reviewed. Patient summary reviewed.    Patient is not a current smoker. Patient not instructed to abstain from smoking on day of procedure. Patient did not smoke on day of surgery.            Induction-     Postoperative Plan-     Informed Consent- Anesthetic plan and risks discussed with patient.  I personally reviewed this patient with the CRNA. Discussed and agreed on the Anesthesia Plan with the CRNA..              Lab Results   Component Value Date    HGBA1C 5.5 07/21/2023       Lab Results   Component Value Date     (H) 09/26/2015    K 3.7 01/05/2024     01/05/2024    CO2 19 (L) 01/05/2024    ANIONGAP 11 09/26/2015    BUN 12 01/05/2024    CREATININE 0.94 01/05/2024    GLUCOSE 99 12/18/2019    GLUF 106 (H) 01/05/2024    CALCIUM 9.4 01/05/2024    CORRECTEDCA 9.4 09/05/2021    AST 41 (H) 01/05/2024    ALT 33 01/05/2024    ALKPHOS 73 01/05/2024    PROT 7.1 08/24/2014    BILITOT 0.53 08/24/2014     EGFR 76 01/05/2024       Lab Results   Component Value Date    WBC 6.09 01/05/2024    HGB 13.6 01/05/2024    HCT 40.4 01/05/2024    MCV 98 01/05/2024     01/05/2024 Nov 2023 normal stress test       This stress echo is borderline abnormal but nondiagnostic for ischemia at   moderate workload @7.1 METS   Technically borderline study   Normal left ventricular systolic function.   Left Ventricle   Left ventricle is normal in size. Wall thickness is uppe3r normal. Systolic function is normal with an ejection fraction of 55-60%. Mild septal hypokinesis typical post CABG.     Right Ventricle   Right ventricle cavity is normal. Systolic function is normal.     Left Atrium   Left atrium cavity is mildly dilated.     Right Atrium   Right atrium cavity is normal.     Mitral Valve   The leaflets are mildly thickened.     Aortic Valve   The leaflets are mildly calcified.     Ascending Aorta   The aortic root appears normal in size.     Pericardium   There is no pericardial effusion.     Study Details   Definity contrast was used during the study.     Resting ECG   ECG: SR, low voltage. Minor nonspecific ST-T changes. ST depression in the inferolateral leads (II, III, aVF, V5 and V6) was noted. The ECG axis is normal.     Stress Findings         Normal sinus rhythm  Nonspecific ST and T wave abnormality  Abnormal ECG  When compared with ECG of 06-AUG-2010 14:41,  No significant change was found  Confirmed by Raymond Hobbs ([id number]) on 1/6/2024 4:21:29 PM      Specimen Collected: 01/05/24 12:03

## 2024-01-09 NOTE — PRE-PROCEDURE INSTRUCTIONS
Pre-Surgery Instructions:   Medication Instructions    acetaminophen (TYLENOL) 325 mg tablet Uses PRN- OK to take day of surgery    albuterol (PROVENTIL HFA,VENTOLIN HFA) 90 mcg/act inhaler Uses PRN- OK to take day of surgery    amLODIPine (NORVASC) 5 mg tablet Uses PRN- OK to take day of surgery    aspirin 81 MG tablet Instructions provided by MD    candesartan (ATACAND) 8 MG tablet Hold day of surgery.    diltiazem (CARDIZEM CD) 240 mg 24 hr capsule Take night before surgery    escitalopram (LEXAPRO) 10 mg tablet Take day of surgery.    Multiple Vitamins-Minerals (CENTRUM SILVER PO) Stop taking 7 days prior to surgery.    Nexlizet 180-10 MG TABS Take night before surgery    nitroglycerin (NITROSTAT) 0.4 mg SL tablet Uses PRN- OK to take day of surgery    Omega-3 Fatty Acids (FISH OIL) 1200 MG CAPS Stop taking 7 days prior to surgery.    oxybutynin (DITROPAN) 5 mg tablet Take night before surgery    tamsulosin (FLOMAX) 0.4 mg Take night before surgery    terazosin (HYTRIN) 2 mg capsule Take night before surgery    warfarin (COUMADIN) 5 mg tablet Instructions provided by MD    Medication instructions for day surgery reviewed. Please use only a sip of water to take your instructed medications. Avoid all over the counter vitamins, supplements and NSAIDS for one week prior to surgery per anesthesia guidelines. Tylenol is ok to take as needed.     You will receive a call one business day prior to surgery with an arrival time and hospital directions. If your surgery is scheduled on a Monday, the hospital will be calling you on the Friday prior to your surgery. If you have not heard from anyone by 8pm, please call the hospital supervisor through the hospital  at [phone]. (Freddie [phone]).    Do not eat or drink anything after midnight the night before your surgery, including candy, mints, lifesavers, or chewing gum. Do not drink alcohol 24hrs before your surgery. Try not to smoke at least 24hrs before  your surgery.       Follow the pre surgery showering instructions as listed in the “My Surgical Experience Booklet” or otherwise provided by your surgeon's office. Do not use a blade to shave the surgical area 1 week before surgery. It is okay to use a clean electric clippers up to 24 hours before surgery. Do not apply any lotions, creams, including makeup, cologne, deodorant, or perfumes after showering on the day of your surgery. Do not use dry shampoo, hair spray, hair gel, or any type of hair products.     No contact lenses, eye make-up, or artificial eyelashes. Remove nail polish, including gel polish, and any artificial, gel, or acrylic nails if possible. Remove all jewelry including rings and body piercing jewelry.     Wear causal clothing that is easy to take on and off. Consider your type of surgery.    Keep any valuables, jewelry, piercings at home. Please bring any specially ordered equipment (sling, braces) if indicated.    Arrange for a responsible person to drive you to and from the hospital on the day of your surgery. Visitor Guidelines discussed.     Call the surgeon's office with any new illnesses, exposures, or additional questions prior to surgery.    Please reference your “My Surgical Experience Booklet” for additional information to prepare for your upcoming surgery.

## 2024-01-10 ENCOUNTER — HOSPITAL ENCOUNTER (OUTPATIENT)
Facility: HOSPITAL | Age: 79
Setting detail: OUTPATIENT SURGERY
Discharge: HOME | End: 2024-01-10
Attending: UROLOGY | Admitting: UROLOGY
Payer: MEDICARE

## 2024-01-10 ENCOUNTER — ANESTHESIA (OUTPATIENT)
Dept: PERIOP | Facility: HOSPITAL | Age: 79
End: 2024-01-10
Payer: MEDICARE

## 2024-01-10 VITALS
HEIGHT: 71 IN | WEIGHT: 200 LBS | OXYGEN SATURATION: 93 % | BODY MASS INDEX: 28 KG/M2 | TEMPERATURE: 97.5 F | RESPIRATION RATE: 16 BRPM | DIASTOLIC BLOOD PRESSURE: 78 MMHG | HEART RATE: 71 BPM | SYSTOLIC BLOOD PRESSURE: 183 MMHG

## 2024-01-10 LAB
APTT PPP: 35 SECONDS (ref 23–37)
INR PPP: 1.06 (ref 0.84–1.19)
PROTHROMBIN TIME: 14.1 SECONDS (ref 11.6–14.5)

## 2024-01-10 PROCEDURE — 55874 TPRNL PLMT BIODEGRDABL MATRL: CPT | Performed by: UROLOGY

## 2024-01-10 PROCEDURE — 85610 PROTHROMBIN TIME: CPT | Performed by: ANESTHESIOLOGY

## 2024-01-10 PROCEDURE — C1889 IMPLANT/INSERT DEVICE, NOC: HCPCS | Performed by: UROLOGY

## 2024-01-10 PROCEDURE — 55876 PLACE RT DEVICE/MARKER PROS: CPT | Performed by: UROLOGY

## 2024-01-10 PROCEDURE — A4648 IMPLANTABLE TISSUE MARKER: HCPCS | Performed by: UROLOGY

## 2024-01-10 PROCEDURE — 85730 THROMBOPLASTIN TIME PARTIAL: CPT | Performed by: ANESTHESIOLOGY

## 2024-01-10 PROCEDURE — NC001 PR NO CHARGE: Performed by: UROLOGY

## 2024-01-10 DEVICE — SPACEOAR SYSTEMS
Type: IMPLANTABLE DEVICE | Site: PERIANAL | Status: FUNCTIONAL
Brand: SPACEOAR VUE™ SYSTEM - 10ML

## 2024-01-10 DEVICE — STERILE PLACEMENT NEEDLES (17GA ETW X 20CM) WITH BONE WAX AND (1.2 X 3MM) SOFT TISSUE GOLD MARKER [3]
Type: IMPLANTABLE DEVICE | Site: PROSTATE | Status: FUNCTIONAL
Brand: FIDUCIAL MARKER KIT

## 2024-01-10 RX ORDER — PROPOFOL 10 MG/ML
INJECTION, EMULSION INTRAVENOUS AS NEEDED
Status: DISCONTINUED | OUTPATIENT
Start: 2024-01-10 | End: 2024-01-10

## 2024-01-10 RX ORDER — HYDROCODONE BITARTRATE AND ACETAMINOPHEN 5; 325 MG/1; MG/1
1 TABLET ORAL EVERY 6 HOURS PRN
Status: CANCELLED | OUTPATIENT
Start: 2024-01-10

## 2024-01-10 RX ORDER — SODIUM CHLORIDE, SODIUM LACTATE, POTASSIUM CHLORIDE, CALCIUM CHLORIDE 600; 310; 30; 20 MG/100ML; MG/100ML; MG/100ML; MG/100ML
50 INJECTION, SOLUTION INTRAVENOUS CONTINUOUS
Status: DISCONTINUED | OUTPATIENT
Start: 2024-01-10 | End: 2024-01-10 | Stop reason: HOSPADM

## 2024-01-10 RX ORDER — CEFAZOLIN SODIUM 2 G/50ML
2000 SOLUTION INTRAVENOUS ONCE
Status: COMPLETED | OUTPATIENT
Start: 2024-01-10 | End: 2024-01-10

## 2024-01-10 RX ORDER — SODIUM CHLORIDE 9 MG/ML
INJECTION INTRAVENOUS AS NEEDED
Status: DISCONTINUED | OUTPATIENT
Start: 2024-01-10 | End: 2024-01-10 | Stop reason: HOSPADM

## 2024-01-10 RX ORDER — LIDOCAINE HYDROCHLORIDE 20 MG/ML
INJECTION, SOLUTION EPIDURAL; INFILTRATION; INTRACAUDAL; PERINEURAL AS NEEDED
Status: DISCONTINUED | OUTPATIENT
Start: 2024-01-10 | End: 2024-01-10

## 2024-01-10 RX ORDER — FENTANYL CITRATE 50 UG/ML
INJECTION, SOLUTION INTRAMUSCULAR; INTRAVENOUS AS NEEDED
Status: DISCONTINUED | OUTPATIENT
Start: 2024-01-10 | End: 2024-01-10

## 2024-01-10 RX ORDER — PROPOFOL 10 MG/ML
INJECTION, EMULSION INTRAVENOUS CONTINUOUS PRN
Status: DISCONTINUED | OUTPATIENT
Start: 2024-01-10 | End: 2024-01-10

## 2024-01-10 RX ORDER — ONDANSETRON 2 MG/ML
INJECTION INTRAMUSCULAR; INTRAVENOUS AS NEEDED
Status: DISCONTINUED | OUTPATIENT
Start: 2024-01-10 | End: 2024-01-10

## 2024-01-10 RX ADMIN — PROPOFOL 50 MCG/KG/MIN: 10 INJECTION, EMULSION INTRAVENOUS at 09:56

## 2024-01-10 RX ADMIN — LIDOCAINE HYDROCHLORIDE 20 MG: 20 INJECTION, SOLUTION EPIDURAL; INFILTRATION; INTRACAUDAL at 10:02

## 2024-01-10 RX ADMIN — FENTANYL CITRATE 50 MCG: 50 INJECTION, SOLUTION INTRAMUSCULAR; INTRAVENOUS at 09:54

## 2024-01-10 RX ADMIN — SODIUM CHLORIDE, SODIUM LACTATE, POTASSIUM CHLORIDE, AND CALCIUM CHLORIDE 50 ML/HR: .6; .31; .03; .02 INJECTION, SOLUTION INTRAVENOUS at 08:39

## 2024-01-10 RX ADMIN — PROPOFOL 50 MG: 10 INJECTION, EMULSION INTRAVENOUS at 09:54

## 2024-01-10 RX ADMIN — FENTANYL CITRATE 50 MCG: 50 INJECTION, SOLUTION INTRAMUSCULAR; INTRAVENOUS at 10:02

## 2024-01-10 RX ADMIN — ONDANSETRON 4 MG: 2 INJECTION INTRAMUSCULAR; INTRAVENOUS at 10:03

## 2024-01-10 RX ADMIN — CEFAZOLIN SODIUM 2000 MG: 2 SOLUTION INTRAVENOUS at 09:59

## 2024-01-10 RX ADMIN — LIDOCAINE HYDROCHLORIDE 80 MG: 20 INJECTION, SOLUTION EPIDURAL; INFILTRATION; INTRACAUDAL at 09:54

## 2024-01-10 NOTE — OP NOTE
OPERATIVE REPORT  PATIENT NAME: Dm Hyatt Jr.    :  1945  MRN: [id number]  Pt Location:  OR ROOM 10    SURGERY DATE: 1/10/2024    Surgeons and Role:     * Dm Hoang MD - Primary    Preop Diagnosis:  Malignant neoplasm of prostate (HCC) [C61]-planning radiation therapy    Post-Op Diagnosis Codes:     * Malignant neoplasm of prostate (HCC) [C61]  -Planning radiation therapy  Procedure(s):  INSERTION OF FIDUCIAL MARKER . SPACEAOR-trans rectal ultrasound-guided transperineal percutaneous placement of prostatic gold fiducial markers and hydrogel SpaceOAR    Specimen(s):  * No specimens in log *    Estimated Blood Loss:   Minimal    Drains:  * No LDAs found *    Anesthesia Type:   IV Sedation with Anesthesia    Operative Indications:  Malignant neoplasm of prostate (HCC) [C61]  -Planning radiation therapy    Operative Findings:  See operative note below    Complications:   None    Procedure and Technique:  I met the patient in the holding area reviewed the proposed procedure step-by-step answered all patient questions performed history and physical discussed risks and complications of the procedure.  The patient expressed understanding and provided both verbal and signed informed consent was taken to the operating room identified by the surgeon placed in the dorsolithotomy position and prepped in the usual fashion after induction of general LMA anesthesia and appropriate timeout.  A transrectal ultrasound probe condom covered lubricated was inserted using the stepper per anus and into the rectal vault with transverse and sagittal imaging of the prostate and seminal vesicles taking place.  Lidocaine 2% with Marcaine 0.5% was used to anesthetize the perineal skin and immediate subcutaneous tissue with ultrasound placement of the same anesthetic mixture at the left and right apices of the prostate under ultrasound guidance.  Percutaneous placement of gold fiducial markers into the left face left apex  and right mid gland then took place under ultrasound control.  A spinal needle was then inserted in the midline over the rectal hump in the perirectal fat and advanced towards the base of the prostate.  Puffs of saline confirmed good positioning and perirectal fat.  10 cc of hydrogel spacer was injected into perirectal fat with excellent dispersion from the base to the apex of the prostate and equal bilateral dispersion.  All needles were removed from the patient as well as the transrectal ultrasound probe.  The patient recovered uneventfully after being taken to the PACU.  There were no complications.  The patient will follow-up with radiation oncology as scheduled and then 3 months after completing radiation therapy will undergo PSA testing and see his urologic provider.   I was present for the entire procedure. and I was present for all critical portions of the procedure.    Patient Disposition:  PACU         SIGNATURE: Dm Hoang MD  DATE: January 10, 2024  TIME: 9:22 AM

## 2024-01-10 NOTE — INTERVAL H&P NOTE
H&P reviewed. After examining the patient I find no changes in the patients condition since the H&P had been written.    Vitals:    01/10/24 0807   BP: (!) 177/83   Pulse: 63   Resp: 16   Temp: (!) 97 °F (36.1 °C)   SpO2: 94%

## 2024-01-10 NOTE — ANESTHESIA POSTPROCEDURE EVALUATION
Post-Op Assessment Note    CV Status:  Stable  Pain Score: 0    Pain management: adequate       Mental Status:  Alert and awake   Hydration Status:  Euvolemic   PONV Controlled:  Controlled   Airway Patency:  Patent     Post Op Vitals Reviewed: Yes      Staff: CRNA               /77 (01/10/24 1015) 164/77   Temp 98 °F (36.7 °C) (01/10/24 1015)    Pulse 80 (01/10/24 1015)79   Resp 16 (01/10/24 1015) 15   SpO2 97 % (01/10/24 1015) 95

## 2024-01-10 NOTE — NURSING NOTE
Pt able to void, eat and drink. Pain is minimal. Pt in no apparent distress. AVS instructions given and pt verbalized understanding. VS stable. IV removed.

## 2024-01-17 ENCOUNTER — APPOINTMENT (OUTPATIENT)
Dept: RADIATION ONCOLOGY | Facility: HOSPITAL | Age: 79
End: 2024-01-17
Attending: STUDENT IN AN ORGANIZED HEALTH CARE EDUCATION/TRAINING PROGRAM
Payer: MEDICARE

## 2024-01-17 PROCEDURE — 77334 RADIATION TREATMENT AID(S): CPT | Performed by: STUDENT IN AN ORGANIZED HEALTH CARE EDUCATION/TRAINING PROGRAM

## 2024-01-23 PROCEDURE — 77300 RADIATION THERAPY DOSE PLAN: CPT | Performed by: STUDENT IN AN ORGANIZED HEALTH CARE EDUCATION/TRAINING PROGRAM

## 2024-01-23 PROCEDURE — 77338 DESIGN MLC DEVICE FOR IMRT: CPT | Performed by: STUDENT IN AN ORGANIZED HEALTH CARE EDUCATION/TRAINING PROGRAM

## 2024-01-23 PROCEDURE — 77301 RADIOTHERAPY DOSE PLAN IMRT: CPT | Performed by: STUDENT IN AN ORGANIZED HEALTH CARE EDUCATION/TRAINING PROGRAM

## 2024-01-24 ENCOUNTER — PROCEDURE VISIT (OUTPATIENT)
Dept: UROLOGY | Facility: CLINIC | Age: 79
End: 2024-01-24
Payer: MEDICARE

## 2024-01-24 VITALS
WEIGHT: 204 LBS | OXYGEN SATURATION: 98 % | BODY MASS INDEX: 28.86 KG/M2 | SYSTOLIC BLOOD PRESSURE: 190 MMHG | DIASTOLIC BLOOD PRESSURE: 100 MMHG | HEART RATE: 76 BPM

## 2024-01-24 DIAGNOSIS — C61 PROSTATE CANCER (HCC): Primary | ICD-10-CM

## 2024-01-24 PROCEDURE — 96402 CHEMO HORMON ANTINEOPL SQ/IM: CPT

## 2024-01-24 NOTE — PROGRESS NOTES
1/24/2024  Dm Hyatt Jr. is a 78 y.o. male  [id number]    Diagnosis:      Patient presents for Lupron injection managed by our office    Plan:  Patient will follow up with heme onc as scheduled.      Medication administration:    Site prepped with alcohol. Medication injected into right upper outer quadrant. Patient tolerated well. No immediate reaction noted.     Of note- patient is extremely hypertensive. Did state he took bp medication this morning. States his bp has been high recently. Was advised to recheck at home being that he amits to having a machine. Was also strongly advised to contact PCP. Pt verbalized understanding.     Administrations This Visit       leuprolide (LUPRON DEPOT 3 MONTH KIT) IM injection 22.5 mg       Admin Date  01/24/2024 Action  Given Dose  22.5 mg Route  Intramuscular Documented By  Avis Quintero RN                    Vitals:    01/24/24 1111   BP: (!) 190/100   BP Location: Left arm   Patient Position: Sitting   Cuff Size: Adult   Pulse: 76   SpO2: 98%   Weight: 92.5 kg (204 lb)         Avis Quintero RN

## 2024-01-25 ENCOUNTER — PATIENT OUTREACH (OUTPATIENT)
Dept: HEMATOLOGY ONCOLOGY | Facility: CLINIC | Age: 79
End: 2024-01-25

## 2024-01-25 NOTE — PROGRESS NOTES
Outreach made to patient. I LM Introducing myself and explained my role. I asked patient to return my call to go over general assessment questions to see if I am able to help in anyway pertaining to his treatment.

## 2024-01-29 ENCOUNTER — APPOINTMENT (OUTPATIENT)
Dept: RADIATION ONCOLOGY | Facility: HOSPITAL | Age: 79
End: 2024-01-29
Attending: STUDENT IN AN ORGANIZED HEALTH CARE EDUCATION/TRAINING PROGRAM
Payer: MEDICARE

## 2024-01-29 ENCOUNTER — PATIENT OUTREACH (OUTPATIENT)
Dept: HEMATOLOGY ONCOLOGY | Facility: CLINIC | Age: 79
End: 2024-01-29

## 2024-01-29 PROCEDURE — 77014 CHG CT GUIDANCE RADIATION THERAPY FLDS PLACEMENT: CPT | Performed by: STUDENT IN AN ORGANIZED HEALTH CARE EDUCATION/TRAINING PROGRAM

## 2024-01-29 PROCEDURE — 77385 HB NTSTY MODUL RAD TX DLVR SMPL: CPT | Performed by: STUDENT IN AN ORGANIZED HEALTH CARE EDUCATION/TRAINING PROGRAM

## 2024-01-29 PROCEDURE — 77427 RADIATION TX MANAGEMENT X5: CPT | Performed by: STUDENT IN AN ORGANIZED HEALTH CARE EDUCATION/TRAINING PROGRAM

## 2024-01-29 NOTE — PROGRESS NOTES
Patient returned my call on 1/26/24 but I was out of office. I returned his call and spoke to patient. I introduced myself and explained my role. We went over general assessment together. He does not have any other questions or concerns at this time but knows I remain available if any arise.

## 2024-01-30 ENCOUNTER — APPOINTMENT (OUTPATIENT)
Dept: RADIATION ONCOLOGY | Facility: HOSPITAL | Age: 79
End: 2024-01-30
Attending: STUDENT IN AN ORGANIZED HEALTH CARE EDUCATION/TRAINING PROGRAM
Payer: MEDICARE

## 2024-01-30 PROCEDURE — 77014 CHG CT GUIDANCE RADIATION THERAPY FLDS PLACEMENT: CPT | Performed by: INTERNAL MEDICINE

## 2024-01-30 PROCEDURE — 77385 HB NTSTY MODUL RAD TX DLVR SMPL: CPT | Performed by: INTERNAL MEDICINE

## 2024-01-31 ENCOUNTER — APPOINTMENT (OUTPATIENT)
Dept: RADIATION ONCOLOGY | Facility: HOSPITAL | Age: 79
End: 2024-01-31
Attending: STUDENT IN AN ORGANIZED HEALTH CARE EDUCATION/TRAINING PROGRAM
Payer: MEDICARE

## 2024-01-31 PROCEDURE — 77014 CHG CT GUIDANCE RADIATION THERAPY FLDS PLACEMENT: CPT | Performed by: STUDENT IN AN ORGANIZED HEALTH CARE EDUCATION/TRAINING PROGRAM

## 2024-01-31 PROCEDURE — 77385 HB NTSTY MODUL RAD TX DLVR SMPL: CPT | Performed by: STUDENT IN AN ORGANIZED HEALTH CARE EDUCATION/TRAINING PROGRAM

## 2024-02-01 ENCOUNTER — APPOINTMENT (OUTPATIENT)
Dept: RADIATION ONCOLOGY | Facility: HOSPITAL | Age: 79
End: 2024-02-01
Attending: STUDENT IN AN ORGANIZED HEALTH CARE EDUCATION/TRAINING PROGRAM
Payer: MEDICARE

## 2024-02-01 PROCEDURE — 77385 HB NTSTY MODUL RAD TX DLVR SMPL: CPT | Performed by: RADIOLOGY

## 2024-02-01 PROCEDURE — 77014 CHG CT GUIDANCE RADIATION THERAPY FLDS PLACEMENT: CPT | Performed by: RADIOLOGY

## 2024-02-02 ENCOUNTER — APPOINTMENT (OUTPATIENT)
Dept: RADIATION ONCOLOGY | Facility: HOSPITAL | Age: 79
End: 2024-02-02
Attending: STUDENT IN AN ORGANIZED HEALTH CARE EDUCATION/TRAINING PROGRAM
Payer: MEDICARE

## 2024-02-02 PROCEDURE — 77385 HB NTSTY MODUL RAD TX DLVR SMPL: CPT | Performed by: STUDENT IN AN ORGANIZED HEALTH CARE EDUCATION/TRAINING PROGRAM

## 2024-02-02 PROCEDURE — 77014 CHG CT GUIDANCE RADIATION THERAPY FLDS PLACEMENT: CPT | Performed by: STUDENT IN AN ORGANIZED HEALTH CARE EDUCATION/TRAINING PROGRAM

## 2024-02-02 PROCEDURE — 77336 RADIATION PHYSICS CONSULT: CPT | Performed by: STUDENT IN AN ORGANIZED HEALTH CARE EDUCATION/TRAINING PROGRAM

## 2024-02-05 ENCOUNTER — APPOINTMENT (OUTPATIENT)
Dept: RADIATION ONCOLOGY | Facility: HOSPITAL | Age: 79
End: 2024-02-05
Attending: UROLOGY
Payer: MEDICARE

## 2024-02-05 PROCEDURE — 77427 RADIATION TX MANAGEMENT X5: CPT | Performed by: STUDENT IN AN ORGANIZED HEALTH CARE EDUCATION/TRAINING PROGRAM

## 2024-02-05 PROCEDURE — 77014 CHG CT GUIDANCE RADIATION THERAPY FLDS PLACEMENT: CPT | Performed by: INTERNAL MEDICINE

## 2024-02-05 PROCEDURE — 77385 HB NTSTY MODUL RAD TX DLVR SMPL: CPT | Performed by: INTERNAL MEDICINE

## 2024-02-06 ENCOUNTER — APPOINTMENT (OUTPATIENT)
Dept: RADIATION ONCOLOGY | Facility: HOSPITAL | Age: 79
End: 2024-02-06
Attending: STUDENT IN AN ORGANIZED HEALTH CARE EDUCATION/TRAINING PROGRAM
Payer: MEDICARE

## 2024-02-06 PROCEDURE — 77385 HB NTSTY MODUL RAD TX DLVR SMPL: CPT | Performed by: INTERNAL MEDICINE

## 2024-02-06 PROCEDURE — 77014 CHG CT GUIDANCE RADIATION THERAPY FLDS PLACEMENT: CPT | Performed by: INTERNAL MEDICINE

## 2024-02-07 ENCOUNTER — APPOINTMENT (OUTPATIENT)
Dept: RADIATION ONCOLOGY | Facility: HOSPITAL | Age: 79
End: 2024-02-07
Attending: STUDENT IN AN ORGANIZED HEALTH CARE EDUCATION/TRAINING PROGRAM
Payer: MEDICARE

## 2024-02-07 PROCEDURE — 77385 HB NTSTY MODUL RAD TX DLVR SMPL: CPT | Performed by: RADIOLOGY

## 2024-02-07 PROCEDURE — 77014 CHG CT GUIDANCE RADIATION THERAPY FLDS PLACEMENT: CPT | Performed by: RADIOLOGY

## 2024-02-08 ENCOUNTER — APPOINTMENT (OUTPATIENT)
Dept: RADIATION ONCOLOGY | Facility: HOSPITAL | Age: 79
End: 2024-02-08
Attending: STUDENT IN AN ORGANIZED HEALTH CARE EDUCATION/TRAINING PROGRAM
Payer: MEDICARE

## 2024-02-08 PROCEDURE — 77014 CHG CT GUIDANCE RADIATION THERAPY FLDS PLACEMENT: CPT | Performed by: INTERNAL MEDICINE

## 2024-02-08 PROCEDURE — 77385 HB NTSTY MODUL RAD TX DLVR SMPL: CPT | Performed by: INTERNAL MEDICINE

## 2024-02-09 ENCOUNTER — APPOINTMENT (OUTPATIENT)
Dept: RADIATION ONCOLOGY | Facility: HOSPITAL | Age: 79
End: 2024-02-09
Attending: STUDENT IN AN ORGANIZED HEALTH CARE EDUCATION/TRAINING PROGRAM
Payer: MEDICARE

## 2024-02-09 PROCEDURE — 77385 HB NTSTY MODUL RAD TX DLVR SMPL: CPT | Performed by: STUDENT IN AN ORGANIZED HEALTH CARE EDUCATION/TRAINING PROGRAM

## 2024-02-09 PROCEDURE — 77336 RADIATION PHYSICS CONSULT: CPT | Performed by: STUDENT IN AN ORGANIZED HEALTH CARE EDUCATION/TRAINING PROGRAM

## 2024-02-09 PROCEDURE — 77014 CHG CT GUIDANCE RADIATION THERAPY FLDS PLACEMENT: CPT | Performed by: STUDENT IN AN ORGANIZED HEALTH CARE EDUCATION/TRAINING PROGRAM

## 2024-02-12 ENCOUNTER — APPOINTMENT (OUTPATIENT)
Dept: RADIATION ONCOLOGY | Facility: HOSPITAL | Age: 79
End: 2024-02-12
Attending: STUDENT IN AN ORGANIZED HEALTH CARE EDUCATION/TRAINING PROGRAM
Payer: MEDICARE

## 2024-02-12 PROCEDURE — 77385 HB NTSTY MODUL RAD TX DLVR SMPL: CPT | Performed by: STUDENT IN AN ORGANIZED HEALTH CARE EDUCATION/TRAINING PROGRAM

## 2024-02-12 PROCEDURE — 77427 RADIATION TX MANAGEMENT X5: CPT | Performed by: STUDENT IN AN ORGANIZED HEALTH CARE EDUCATION/TRAINING PROGRAM

## 2024-02-12 PROCEDURE — 77014 CHG CT GUIDANCE RADIATION THERAPY FLDS PLACEMENT: CPT | Performed by: STUDENT IN AN ORGANIZED HEALTH CARE EDUCATION/TRAINING PROGRAM

## 2024-02-13 ENCOUNTER — APPOINTMENT (OUTPATIENT)
Dept: RADIATION ONCOLOGY | Facility: HOSPITAL | Age: 79
End: 2024-02-13
Attending: STUDENT IN AN ORGANIZED HEALTH CARE EDUCATION/TRAINING PROGRAM
Payer: MEDICARE

## 2024-02-14 ENCOUNTER — APPOINTMENT (OUTPATIENT)
Dept: RADIATION ONCOLOGY | Facility: HOSPITAL | Age: 79
End: 2024-02-14
Attending: STUDENT IN AN ORGANIZED HEALTH CARE EDUCATION/TRAINING PROGRAM
Payer: MEDICARE

## 2024-02-14 PROCEDURE — 77385 HB NTSTY MODUL RAD TX DLVR SMPL: CPT | Performed by: STUDENT IN AN ORGANIZED HEALTH CARE EDUCATION/TRAINING PROGRAM

## 2024-02-14 PROCEDURE — 77014 CHG CT GUIDANCE RADIATION THERAPY FLDS PLACEMENT: CPT | Performed by: STUDENT IN AN ORGANIZED HEALTH CARE EDUCATION/TRAINING PROGRAM

## 2024-02-15 ENCOUNTER — PATIENT OUTREACH (OUTPATIENT)
Dept: HEMATOLOGY ONCOLOGY | Facility: CLINIC | Age: 79
End: 2024-02-15

## 2024-02-15 ENCOUNTER — APPOINTMENT (OUTPATIENT)
Dept: RADIATION ONCOLOGY | Facility: HOSPITAL | Age: 79
End: 2024-02-15
Attending: STUDENT IN AN ORGANIZED HEALTH CARE EDUCATION/TRAINING PROGRAM
Payer: MEDICARE

## 2024-02-15 ENCOUNTER — DOCUMENTATION (OUTPATIENT)
Dept: HEMATOLOGY ONCOLOGY | Facility: CLINIC | Age: 79
End: 2024-02-15

## 2024-02-15 PROCEDURE — 77385 HB NTSTY MODUL RAD TX DLVR SMPL: CPT | Performed by: INTERNAL MEDICINE

## 2024-02-15 PROCEDURE — 77014 CHG CT GUIDANCE RADIATION THERAPY FLDS PLACEMENT: CPT | Performed by: INTERNAL MEDICINE

## 2024-02-15 NOTE — PROGRESS NOTES
Patient is scheduled to complete RT on 3/7/24. Message sent to Urology office to debra 3 month follow up post RT with PSA PTV. I will follow up on appt in the near future.

## 2024-02-15 NOTE — PROGRESS NOTES
MID ASSESSMENT      Are you having any side effects from your treatment?  - No    Are you eating and drinking properly?  - No    Do you have any urinary issues?  - No    Are you having any pain?  - No    Have needs changed for a palliative care referral?  - No    How would you describe your mood (Calm, Anxious, Depressed, Tired, Overwhelmed)?  - Calm    Do you know when your upcoming appointments are?  - Yes    Do you have a good support system?  - Yes    Are you interested in any support groups?  - Yes    Do you have any questions or concerns regarding your treatment plan?  - No    Outreach made to patient we went over MID assessment together. He does not have any other questions or concerns at this time but knows I remain available if any arise

## 2024-02-16 ENCOUNTER — APPOINTMENT (OUTPATIENT)
Dept: RADIATION ONCOLOGY | Facility: HOSPITAL | Age: 79
End: 2024-02-16
Attending: STUDENT IN AN ORGANIZED HEALTH CARE EDUCATION/TRAINING PROGRAM
Payer: MEDICARE

## 2024-02-16 PROCEDURE — 77385 HB NTSTY MODUL RAD TX DLVR SMPL: CPT | Performed by: STUDENT IN AN ORGANIZED HEALTH CARE EDUCATION/TRAINING PROGRAM

## 2024-02-16 PROCEDURE — 77014 CHG CT GUIDANCE RADIATION THERAPY FLDS PLACEMENT: CPT | Performed by: STUDENT IN AN ORGANIZED HEALTH CARE EDUCATION/TRAINING PROGRAM

## 2024-02-19 ENCOUNTER — APPOINTMENT (OUTPATIENT)
Dept: RADIATION ONCOLOGY | Facility: HOSPITAL | Age: 79
End: 2024-02-19
Attending: STUDENT IN AN ORGANIZED HEALTH CARE EDUCATION/TRAINING PROGRAM
Payer: MEDICARE

## 2024-02-19 PROCEDURE — 77385 HB NTSTY MODUL RAD TX DLVR SMPL: CPT | Performed by: STUDENT IN AN ORGANIZED HEALTH CARE EDUCATION/TRAINING PROGRAM

## 2024-02-19 PROCEDURE — 77014 CHG CT GUIDANCE RADIATION THERAPY FLDS PLACEMENT: CPT | Performed by: STUDENT IN AN ORGANIZED HEALTH CARE EDUCATION/TRAINING PROGRAM

## 2024-02-19 PROCEDURE — 77336 RADIATION PHYSICS CONSULT: CPT | Performed by: STUDENT IN AN ORGANIZED HEALTH CARE EDUCATION/TRAINING PROGRAM

## 2024-02-20 ENCOUNTER — APPOINTMENT (OUTPATIENT)
Dept: RADIATION ONCOLOGY | Facility: HOSPITAL | Age: 79
End: 2024-02-20
Attending: STUDENT IN AN ORGANIZED HEALTH CARE EDUCATION/TRAINING PROGRAM
Payer: MEDICARE

## 2024-02-20 PROCEDURE — 77014 CHG CT GUIDANCE RADIATION THERAPY FLDS PLACEMENT: CPT | Performed by: INTERNAL MEDICINE

## 2024-02-20 PROCEDURE — 77385 HB NTSTY MODUL RAD TX DLVR SMPL: CPT | Performed by: INTERNAL MEDICINE

## 2024-02-20 PROCEDURE — 77427 RADIATION TX MANAGEMENT X5: CPT | Performed by: STUDENT IN AN ORGANIZED HEALTH CARE EDUCATION/TRAINING PROGRAM

## 2024-02-21 ENCOUNTER — APPOINTMENT (OUTPATIENT)
Dept: RADIATION ONCOLOGY | Facility: HOSPITAL | Age: 79
End: 2024-02-21
Attending: STUDENT IN AN ORGANIZED HEALTH CARE EDUCATION/TRAINING PROGRAM
Payer: MEDICARE

## 2024-02-21 PROCEDURE — 77014 CHG CT GUIDANCE RADIATION THERAPY FLDS PLACEMENT: CPT | Performed by: STUDENT IN AN ORGANIZED HEALTH CARE EDUCATION/TRAINING PROGRAM

## 2024-02-21 PROCEDURE — 77385 HB NTSTY MODUL RAD TX DLVR SMPL: CPT | Performed by: STUDENT IN AN ORGANIZED HEALTH CARE EDUCATION/TRAINING PROGRAM

## 2024-02-22 ENCOUNTER — APPOINTMENT (OUTPATIENT)
Dept: RADIATION ONCOLOGY | Facility: HOSPITAL | Age: 79
End: 2024-02-22
Attending: STUDENT IN AN ORGANIZED HEALTH CARE EDUCATION/TRAINING PROGRAM
Payer: MEDICARE

## 2024-02-22 PROCEDURE — 77385 HB NTSTY MODUL RAD TX DLVR SMPL: CPT | Performed by: RADIOLOGY

## 2024-02-22 PROCEDURE — 77014 CHG CT GUIDANCE RADIATION THERAPY FLDS PLACEMENT: CPT | Performed by: RADIOLOGY

## 2024-02-23 ENCOUNTER — APPOINTMENT (OUTPATIENT)
Dept: RADIATION ONCOLOGY | Facility: HOSPITAL | Age: 79
End: 2024-02-23
Attending: STUDENT IN AN ORGANIZED HEALTH CARE EDUCATION/TRAINING PROGRAM
Payer: MEDICARE

## 2024-02-23 PROCEDURE — 77014 CHG CT GUIDANCE RADIATION THERAPY FLDS PLACEMENT: CPT | Performed by: STUDENT IN AN ORGANIZED HEALTH CARE EDUCATION/TRAINING PROGRAM

## 2024-02-23 PROCEDURE — 77385 HB NTSTY MODUL RAD TX DLVR SMPL: CPT | Performed by: STUDENT IN AN ORGANIZED HEALTH CARE EDUCATION/TRAINING PROGRAM

## 2024-02-26 ENCOUNTER — PATIENT OUTREACH (OUTPATIENT)
Dept: HEMATOLOGY ONCOLOGY | Facility: CLINIC | Age: 79
End: 2024-02-26

## 2024-02-26 ENCOUNTER — APPOINTMENT (OUTPATIENT)
Dept: RADIATION ONCOLOGY | Facility: HOSPITAL | Age: 79
End: 2024-02-26
Attending: STUDENT IN AN ORGANIZED HEALTH CARE EDUCATION/TRAINING PROGRAM
Payer: MEDICARE

## 2024-02-26 PROCEDURE — 77014 CHG CT GUIDANCE RADIATION THERAPY FLDS PLACEMENT: CPT | Performed by: STUDENT IN AN ORGANIZED HEALTH CARE EDUCATION/TRAINING PROGRAM

## 2024-02-26 PROCEDURE — 77336 RADIATION PHYSICS CONSULT: CPT | Performed by: STUDENT IN AN ORGANIZED HEALTH CARE EDUCATION/TRAINING PROGRAM

## 2024-02-26 PROCEDURE — 77385 HB NTSTY MODUL RAD TX DLVR SMPL: CPT | Performed by: STUDENT IN AN ORGANIZED HEALTH CARE EDUCATION/TRAINING PROGRAM

## 2024-02-26 NOTE — PROGRESS NOTES
Met with patient during Prostate RT today. Introduced myself in person. Patient does not have any side effects at this time. Patient does not have any questions or concerns but has my direct line if any arise.

## 2024-02-27 ENCOUNTER — APPOINTMENT (OUTPATIENT)
Dept: RADIATION ONCOLOGY | Facility: HOSPITAL | Age: 79
End: 2024-02-27
Attending: STUDENT IN AN ORGANIZED HEALTH CARE EDUCATION/TRAINING PROGRAM
Payer: MEDICARE

## 2024-02-27 PROCEDURE — 77427 RADIATION TX MANAGEMENT X5: CPT | Performed by: STUDENT IN AN ORGANIZED HEALTH CARE EDUCATION/TRAINING PROGRAM

## 2024-02-27 PROCEDURE — 77385 HB NTSTY MODUL RAD TX DLVR SMPL: CPT | Performed by: INTERNAL MEDICINE

## 2024-02-27 PROCEDURE — 77014 CHG CT GUIDANCE RADIATION THERAPY FLDS PLACEMENT: CPT | Performed by: INTERNAL MEDICINE

## 2024-02-28 ENCOUNTER — APPOINTMENT (OUTPATIENT)
Dept: RADIATION ONCOLOGY | Facility: HOSPITAL | Age: 79
End: 2024-02-28
Attending: STUDENT IN AN ORGANIZED HEALTH CARE EDUCATION/TRAINING PROGRAM
Payer: MEDICARE

## 2024-02-28 PROCEDURE — 77385 HB NTSTY MODUL RAD TX DLVR SMPL: CPT | Performed by: RADIOLOGY

## 2024-02-28 PROCEDURE — 77014 CHG CT GUIDANCE RADIATION THERAPY FLDS PLACEMENT: CPT | Performed by: RADIOLOGY

## 2024-02-29 ENCOUNTER — APPOINTMENT (OUTPATIENT)
Dept: RADIATION ONCOLOGY | Facility: HOSPITAL | Age: 79
End: 2024-02-29
Attending: STUDENT IN AN ORGANIZED HEALTH CARE EDUCATION/TRAINING PROGRAM
Payer: MEDICARE

## 2024-02-29 PROCEDURE — 77014 CHG CT GUIDANCE RADIATION THERAPY FLDS PLACEMENT: CPT | Performed by: RADIOLOGY

## 2024-02-29 PROCEDURE — 77385 HB NTSTY MODUL RAD TX DLVR SMPL: CPT | Performed by: RADIOLOGY

## 2024-03-01 ENCOUNTER — APPOINTMENT (OUTPATIENT)
Dept: RADIATION ONCOLOGY | Facility: HOSPITAL | Age: 79
End: 2024-03-01
Attending: STUDENT IN AN ORGANIZED HEALTH CARE EDUCATION/TRAINING PROGRAM
Payer: MEDICARE

## 2024-03-01 PROCEDURE — 77014 CHG CT GUIDANCE RADIATION THERAPY FLDS PLACEMENT: CPT | Performed by: STUDENT IN AN ORGANIZED HEALTH CARE EDUCATION/TRAINING PROGRAM

## 2024-03-01 PROCEDURE — 77385 HB NTSTY MODUL RAD TX DLVR SMPL: CPT | Performed by: STUDENT IN AN ORGANIZED HEALTH CARE EDUCATION/TRAINING PROGRAM

## 2024-03-04 ENCOUNTER — APPOINTMENT (OUTPATIENT)
Dept: RADIATION ONCOLOGY | Facility: HOSPITAL | Age: 79
End: 2024-03-04
Attending: STUDENT IN AN ORGANIZED HEALTH CARE EDUCATION/TRAINING PROGRAM
Payer: MEDICARE

## 2024-03-04 PROCEDURE — 77014 CHG CT GUIDANCE RADIATION THERAPY FLDS PLACEMENT: CPT | Performed by: RADIOLOGY

## 2024-03-04 PROCEDURE — 77385 HB NTSTY MODUL RAD TX DLVR SMPL: CPT | Performed by: RADIOLOGY

## 2024-03-04 PROCEDURE — 77336 RADIATION PHYSICS CONSULT: CPT | Performed by: STUDENT IN AN ORGANIZED HEALTH CARE EDUCATION/TRAINING PROGRAM

## 2024-03-05 ENCOUNTER — APPOINTMENT (OUTPATIENT)
Dept: RADIATION ONCOLOGY | Facility: HOSPITAL | Age: 79
End: 2024-03-05
Attending: STUDENT IN AN ORGANIZED HEALTH CARE EDUCATION/TRAINING PROGRAM
Payer: MEDICARE

## 2024-03-05 PROCEDURE — 77014 CHG CT GUIDANCE RADIATION THERAPY FLDS PLACEMENT: CPT | Performed by: INTERNAL MEDICINE

## 2024-03-05 PROCEDURE — 77385 HB NTSTY MODUL RAD TX DLVR SMPL: CPT | Performed by: INTERNAL MEDICINE

## 2024-03-05 PROCEDURE — 77427 RADIATION TX MANAGEMENT X5: CPT | Performed by: STUDENT IN AN ORGANIZED HEALTH CARE EDUCATION/TRAINING PROGRAM

## 2024-03-06 ENCOUNTER — APPOINTMENT (OUTPATIENT)
Dept: RADIATION ONCOLOGY | Facility: HOSPITAL | Age: 79
End: 2024-03-06
Attending: STUDENT IN AN ORGANIZED HEALTH CARE EDUCATION/TRAINING PROGRAM
Payer: MEDICARE

## 2024-03-06 ENCOUNTER — APPOINTMENT (OUTPATIENT)
Dept: RADIATION ONCOLOGY | Facility: HOSPITAL | Age: 79
End: 2024-03-06
Payer: MEDICARE

## 2024-03-06 PROCEDURE — 77385 HB NTSTY MODUL RAD TX DLVR SMPL: CPT | Performed by: STUDENT IN AN ORGANIZED HEALTH CARE EDUCATION/TRAINING PROGRAM

## 2024-03-06 PROCEDURE — 77014 CHG CT GUIDANCE RADIATION THERAPY FLDS PLACEMENT: CPT | Performed by: STUDENT IN AN ORGANIZED HEALTH CARE EDUCATION/TRAINING PROGRAM

## 2024-03-07 ENCOUNTER — APPOINTMENT (OUTPATIENT)
Dept: RADIATION ONCOLOGY | Facility: HOSPITAL | Age: 79
End: 2024-03-07
Attending: STUDENT IN AN ORGANIZED HEALTH CARE EDUCATION/TRAINING PROGRAM
Payer: MEDICARE

## 2024-03-07 PROCEDURE — 77014 CHG CT GUIDANCE RADIATION THERAPY FLDS PLACEMENT: CPT | Performed by: RADIOLOGY

## 2024-03-07 PROCEDURE — 77336 RADIATION PHYSICS CONSULT: CPT | Performed by: STUDENT IN AN ORGANIZED HEALTH CARE EDUCATION/TRAINING PROGRAM

## 2024-03-07 PROCEDURE — 77385 HB NTSTY MODUL RAD TX DLVR SMPL: CPT | Performed by: RADIOLOGY

## 2024-03-13 ENCOUNTER — RA CDI HCC (OUTPATIENT)
Dept: OTHER | Facility: HOSPITAL | Age: 79
End: 2024-03-13

## 2024-03-13 LAB
ALBUMIN SERPL-MCNC: 4.2 G/DL (ref 3.5–5.7)
ALP SERPL-CCNC: 68 U/L (ref 35–120)
ALT SERPL-CCNC: 34 U/L
ANION GAP SERPL CALCULATED.3IONS-SCNC: 11 MMOL/L (ref 3–11)
AST SERPL-CCNC: 40 U/L
BILIRUB SERPL-MCNC: 0.9 MG/DL (ref 0.2–1)
BUN SERPL-MCNC: 19 MG/DL (ref 7–28)
CALCIUM SERPL-MCNC: 9.8 MG/DL (ref 8.5–10.1)
CHLORIDE SERPL-SCNC: 106 MMOL/L (ref 100–109)
CHOLEST SERPL-MCNC: 157 MG/DL
CHOLEST/HDLC SERPL: 3.8 {RATIO}
CO2 SERPL-SCNC: 25 MMOL/L (ref 21–31)
CREAT SERPL-MCNC: 1.13 MG/DL (ref 0.53–1.3)
CYTOLOGY CMNT CVX/VAG CYTO-IMP: ABNORMAL
GFR/BSA.PRED SERPLBLD CYS-BASED-ARV: 65 ML/MIN/{1.73_M2}
GLUCOSE SERPL-MCNC: 103 MG/DL (ref 65–99)
HDLC SERPL-MCNC: 41 MG/DL (ref 23–92)
LDLC SERPL CALC-MCNC: 99 MG/DL
NONHDLC SERPL-MCNC: 116 MG/DL
POTASSIUM SERPL-SCNC: 4.1 MMOL/L (ref 3.5–5.2)
PROT SERPL-MCNC: 7 G/DL (ref 6.3–8.3)
SODIUM SERPL-SCNC: 142 MMOL/L (ref 135–145)
T3FREE SERPL-MCNC: 3.33 PG/ML (ref 2.5–3.9)
TRIGL SERPL-MCNC: 86 MG/DL
TSH SERPL-ACNC: 1.76 UIU/ML (ref 0.45–5.33)

## 2024-03-19 ENCOUNTER — OFFICE VISIT (OUTPATIENT)
Dept: INTERNAL MEDICINE CLINIC | Facility: CLINIC | Age: 79
End: 2024-03-19
Payer: MEDICARE

## 2024-03-19 VITALS
HEIGHT: 71 IN | OXYGEN SATURATION: 96 % | RESPIRATION RATE: 16 BRPM | WEIGHT: 197 LBS | BODY MASS INDEX: 27.58 KG/M2 | SYSTOLIC BLOOD PRESSURE: 130 MMHG | HEART RATE: 72 BPM | DIASTOLIC BLOOD PRESSURE: 82 MMHG

## 2024-03-19 DIAGNOSIS — R06.02 SOB (SHORTNESS OF BREATH): ICD-10-CM

## 2024-03-19 DIAGNOSIS — E78.1 HYPERTRIGLYCERIDEMIA: ICD-10-CM

## 2024-03-19 DIAGNOSIS — N18.2 STAGE 2 CHRONIC KIDNEY DISEASE: ICD-10-CM

## 2024-03-19 DIAGNOSIS — C61 PROSTATE CANCER (HCC): ICD-10-CM

## 2024-03-19 DIAGNOSIS — E78.00 HYPERCHOLESTEROLEMIA: ICD-10-CM

## 2024-03-19 DIAGNOSIS — R73.09 ABNORMAL GLUCOSE: ICD-10-CM

## 2024-03-19 DIAGNOSIS — I10 ESSENTIAL HYPERTENSION: Primary | ICD-10-CM

## 2024-03-19 PROCEDURE — G2211 COMPLEX E/M VISIT ADD ON: HCPCS | Performed by: INTERNAL MEDICINE

## 2024-03-19 PROCEDURE — 99214 OFFICE O/P EST MOD 30 MIN: CPT | Performed by: INTERNAL MEDICINE

## 2024-03-19 NOTE — PROGRESS NOTES
Name: Dm Hyatt Jr.      : 1945      MRN: [id number]  Encounter Provider: Jordi Hart MD  Encounter Date: 3/19/2024   Encounter department: [facility] INTERNAL MEDICINE    Assessment & Plan     1. Abnormal glucose  Assessment & Plan:  Fasting blood sugar reviewed during today's visit shows significant improvement he is down from 119-1 03.  I encouraged him to continue to be cautious with his consumption of concentrated sugars and excessive starches.    Orders:  -     Comprehensive metabolic panel; Future  -     Hemoglobin A1C; Future    2. Hypercholesterolemia  Assessment & Plan:  Lipid profile reviewed with the patient today recommend continuation of low-cholesterol diet and continuation of current dosing of nexlizet occasion.    Orders:  -     Lipid panel; Future    3. Essential hypertension  Assessment & Plan:  Blood pressure assessment today confirms adequate control recommend continuation of current blood pressure medication      4. Stage 2 chronic kidney disease  Assessment & Plan:  Lab Results   Component Value Date    EGFR 65 2024    EGFR 65 2024    EGFR 62 2024    CREATININE 1.13 2024    CREATININE 1.13 2024    CREATININE 1.19 2024   Most recent blood work reveals a GFR of 65 cc/min she is stable at this time recommend continued surveillance.  Avoid dehydration and maintain blood pressure control      5. Prostate cancer (HCC)  Assessment & Plan:  Patient has completed radiation treatment for his prostate cancer.  Indicates that he did well with the treatments as had no significant side effects as far.  Recommend continued surveillance with urology services      6. Hypertriglyceridemia  Assessment & Plan:  Triglyceride readings reviewed with the patient continue care and consumption of concentrated sugars and excessive carbohydrates.      7. SOB (shortness of breath)  Assessment & Plan:  Persistent shortness of breath which limits  physical activities for the patient he is considering having a cardiac catheterization done to evaluate for possible cardiac cause.             Subjective      This 78-year-old gentleman returns to our office today for routine follow-up visit and review of blood work.  He continues to experience shortness of breath on exertion which limits his physical activity at times.  Previous pulmonary workup has not revealed any significant Meding factors oxygenation on room air is at 96% today he has discussed these symptoms with his cardiologist and is considering having a cardiac catheterization performed.  He does not report any typical angina type of pain any palpitations.      Review of Systems   Respiratory:  Positive for shortness of breath.    All other systems reviewed and are negative.      Current Outpatient Medications on File Prior to Visit   Medication Sig    acetaminophen (TYLENOL) 325 mg tablet Take 2 tablets (650 mg total) by mouth every 4 (four) hours as needed for mild pain    albuterol (PROVENTIL HFA,VENTOLIN HFA) 90 mcg/act inhaler Inhale 2 puffs every 6 (six) hours as needed    amLODIPine (NORVASC) 5 mg tablet TAKE 1 TAB WHEN  OR ABOVE    aspirin 81 MG tablet Take 81 mg by mouth    candesartan (ATACAND) 8 MG tablet     diltiazem (CARDIZEM CD) 240 mg 24 hr capsule every evening    escitalopram (LEXAPRO) 10 mg tablet TAKE 1 TABLET BY MOUTH  DAILY    Multiple Vitamins-Minerals (CENTRUM SILVER PO) Take by mouth    Nexlizet 180-10 MG TABS every evening    nitroglycerin (NITROSTAT) 0.4 mg SL tablet Place 1 tablet under the tongue every 5 (five) minutes as needed    Omega-3 Fatty Acids (FISH OIL) 1200 MG CAPS Take by mouth    oxybutynin (DITROPAN) 5 mg tablet TAKE 1 TABLET BY MOUTH AT BEDTIME    tamsulosin (FLOMAX) 0.4 mg TAKE 1 CAPSULE BY MOUTH  DAILY WITH DINNER    terazosin (HYTRIN) 2 mg capsule daily at bedtime    warfarin (COUMADIN) 5 mg tablet Take 5 mg by mouth       Objective     /82    "Pulse 72   Resp 16   Ht 5' 10.5\" (1.791 m)   Wt 89.4 kg (197 lb)   SpO2 96%   BMI 27.87 kg/m²     Physical Exam  Constitutional:       General: He is not in acute distress.     Appearance: Normal appearance. He is not ill-appearing.   HENT:      Head: Normocephalic.   Eyes:      Pupils: Pupils are equal, round, and reactive to light.   Cardiovascular:      Rate and Rhythm: Regular rhythm.   Pulmonary:      Breath sounds: No wheezing, rhonchi or rales.   Musculoskeletal:      Right lower leg: No edema.      Left lower leg: No edema.   Neurological:      Mental Status: He is alert. Mental status is at baseline.   Psychiatric:         Mood and Affect: Mood normal.         Behavior: Behavior normal.         Thought Content: Thought content normal.         Judgment: Judgment normal.       Jordi Hart MD    "

## 2024-03-19 NOTE — ASSESSMENT & PLAN NOTE
Lab Results   Component Value Date    EGFR 65 03/13/2024    EGFR 65 03/13/2024    EGFR 62 01/16/2024    CREATININE 1.13 03/13/2024    CREATININE 1.13 03/13/2024    CREATININE 1.19 01/16/2024   Most recent blood work reveals a GFR of 65 cc/min she is stable at this time recommend continued surveillance.  Avoid dehydration and maintain blood pressure control

## 2024-03-19 NOTE — ASSESSMENT & PLAN NOTE
Lipid profile reviewed with the patient today recommend continuation of low-cholesterol diet and continuation of current dosing of nexlizet occasion.

## 2024-03-19 NOTE — ASSESSMENT & PLAN NOTE
Fasting blood sugar reviewed during today's visit shows significant improvement he is down from 119-1 03.  I encouraged him to continue to be cautious with his consumption of concentrated sugars and excessive starches.

## 2024-03-19 NOTE — ASSESSMENT & PLAN NOTE
Blood pressure assessment today confirms adequate control recommend continuation of current blood pressure medication

## 2024-03-19 NOTE — ASSESSMENT & PLAN NOTE
Persistent shortness of breath which limits physical activities for the patient he is considering having a cardiac catheterization done to evaluate for possible cardiac cause.

## 2024-03-19 NOTE — ASSESSMENT & PLAN NOTE
Patient has completed radiation treatment for his prostate cancer.  Indicates that he did well with the treatments as had no significant side effects as far.  Recommend continued surveillance with urology services

## 2024-03-19 NOTE — ASSESSMENT & PLAN NOTE
Triglyceride readings reviewed with the patient continue care and consumption of concentrated sugars and excessive carbohydrates.

## 2024-03-21 ENCOUNTER — PATIENT OUTREACH (OUTPATIENT)
Dept: HEMATOLOGY ONCOLOGY | Facility: CLINIC | Age: 79
End: 2024-03-21

## 2024-03-21 NOTE — PROGRESS NOTES
Outreach made to patient. LM asking patient to return my call to go over end of treatment questions. I will make another outreach if I do not hear back from patient in a timely manner.

## 2024-03-22 ENCOUNTER — PATIENT OUTREACH (OUTPATIENT)
Dept: HEMATOLOGY ONCOLOGY | Facility: CLINIC | Age: 79
End: 2024-03-22

## 2024-03-22 NOTE — PROGRESS NOTES
End Of Treatment Assessment      Are you having any lingering side effects from your treatment?  - Fatigue     Are you interested in any support group information?  - No    Do you know who to call if you have any questions or concerns in the future?  - Yes    Are you aware of your upcoming Urology appointment with PSA prior?  - 6/19/24    Is there anything else I can do for you?  - No    Outreach made to patient. We went over end of treatment assessment together. He is doing well. He does not have any other questions at this time knows to call if any arise.

## 2024-04-22 LAB
INR PPP: 2.2
PROTHROMBIN TIME: 24.2 SEC (ref 12–14.6)

## 2024-04-23 ENCOUNTER — TELEMEDICINE (OUTPATIENT)
Dept: RADIATION ONCOLOGY | Facility: CLINIC | Age: 79
End: 2024-04-23
Attending: STUDENT IN AN ORGANIZED HEALTH CARE EDUCATION/TRAINING PROGRAM

## 2024-04-23 DIAGNOSIS — C61 PROSTATE CANCER (HCC): Primary | ICD-10-CM

## 2024-04-23 PROCEDURE — 99024 POSTOP FOLLOW-UP VISIT: CPT | Performed by: STUDENT IN AN ORGANIZED HEALTH CARE EDUCATION/TRAINING PROGRAM

## 2024-04-23 NOTE — PROGRESS NOTES
Telephone Follow-up - Radiation Oncology   Dm Hyatt Jr. 1945 78 y.o. male [id number]      History of Present Illness   Cancer Staging   Prostate cancer ([facility])  Staging form: Prostate, AJCC 8th Edition  - Clinical: Stage IIC (cT1c, cN0, cM0, PSA: 13.6, Grade Group: 3) - Signed by Peter Morton MD on 12/14/2023  Prostate specific antigen (PSA) range: 10 to 19  Jason score: 7  Histologic grading system: 5 grade system      Mr. Dm Hyatt is a 78 year old man with unfavorable intermediate risk (cT1c, PSA 13.6, GS 4+3) prostate adenocarcinoma. On 3/7/24 he completed a course of definitive RT to the prostate/SVs to a dose of 7000cGy in 28 fractions. He did receive ADT with RT.     Interval History:  The patient tolerated RT well overall apart from increased fatigue. Currently he is doing well overall with improvement in his urinary function (nocturia and stream strength returning to normal). He has no diarrhea. Energy is returning to normal. He will see urology in 6/2024 with repeat PSA.         Historical Information   Oncology History   Prostate cancer ([facility])   2022 Initial Diagnosis    Prostate cancer ([facility])     11/21/2022 Biopsy    A. Prostate, left lateral base:  - Benign prostate tissue.      B. Prostate, left lateral mid:  - Benign prostate tissue.      C. Prostate, left lateral apex:  - Focal high grade prostatic intraepithelial neoplasia (HGPIN), see comment.     Comment: Immunohistochemistry for a prostate multiplex stain (p63, K903, and P504S) demonstrates HGPIN and no evidence of invasive prostatic carcinoma.     D. Prostate, left base:  - Focal high grade prostatic intraepithelial neoplasia (HGPIN), see comment.     Comment: Immunohistochemistry for a prostate multiplex stain (p63, K903, and P504S) demonstrates HGPIN and no evidence of invasive prostatic carcinoma.     E. Prostate, left mid:  - Benign prostate tissue with focal chronic inflammation.      F. Prostate, left  apex:  -Prostatic adenocarcinoma, [place] score 3 + 3 = 6, Prognostic Grade Group 1,  involving 20% of 1 needle core  and measuring  3 mm in length, see comment.     Comment: Immunohistochemistry for a prostate multiplex stain (p63, K903, and P504S) demonstrates presence of invasive prostatic adenocarcinoma.     G. Prostate, right lateral base:  -Prostatic adenocarcinoma, [place] score 3 + 4 = 7, Prognostic Grade Group 2,  involving 75% of 1 needle core  and measuring  9 mm in length, see comment.               Pattern 4 ~5-10% of the tumor (ill-defined glands)  - Additional Pathologic Findings: HGPIN     Comment: Immunohistochemistry for a prostate multiplex stain (p63, K903, and P504S) demonstrates presence of invasive prostatic adenocarcinoma and HGPIN.        H. Prostate, right lateral mid:  - Small atypical acinar proliferation (ASAP) and high grade prostatic intraepithelial neoplasia (HGPIN), see comment.     Comment: Immunohistochemistry for a prostate multiplex stain (p63, K903, and P504S) demonstrates HGPIN and no definitive evidence of invasive prostatic carcinoma.     I. Prostate, right lateral apex:  - High grade prostatic intraepithelial neoplasia (HGPIN).     J. Prostate, right base:  -Prostatic adenocarcinoma, [place] score 4 + 3 = 7, Prognostic Grade Group 3,  involving 70% of 1 needle core  and measuring  9 mm in length, see comment.               Pattern 4 comprised of glomeruloid pattern, ill-defined and fused glands.  - Additional Pathologic Findings: HGPIN     Comment: Immunohistochemistry for a prostate multiplex stain (p63, K903, and P504S) demonstrates presence of invasive prostatic adenocarcinoma and HGPIN.     K. Prostate, right mid:  -Prostatic adenocarcinoma, Jason score 3 + 4 = 7, Prognostic Grade Group 2,  involving 60% of 1 needle core  and measuring  8 mm in length, see comment.               Pattern 4 ~10% comprised of ill-defined and fused glands  - Additional Pathologic Findings:  "HGPIN     L. Prostate, right base:  - High grade prostatic intraepithelial neoplasia (HGPIN).     12/21/2022 -  Hormone Therapy    Lupron injection      12/14/2023 -  Cancer Staged    Staging form: Prostate, AJCC 8th Edition  - Clinical: Stage IIC (cT1c, cN0, cM0, PSA: 13.6, Grade Group: 3) - Signed by Peter Morton MD on 12/14/2023  Prostate specific antigen (PSA) range: 10 to 19  Jason score: 7  Histologic grading system: 5 grade system       1/29/2024 - 3/7/2024 Radiation    Course: C1    Plan ID Energy Fractions Dose per Fraction (cGy) Dose Correction (cGy) Total Dose Delivered (cGy) Elapsed Days   Hypo ProstSV 10X 28 / 28 250 0 7,000 38      Treatment Dates:  1/29/2024 - 3/7/2024.            OBJECTIVE:   There were no vitals taken for this visit.  No exam performed for this visit.         Assessment/Plan:  No orders of the defined types were placed in this encounter.     Approximately 6 weeks following completion of definitive RT, the patient is doing well overall and has largely recovered from his prior radiation related symptoms.  He is scheduled to see urology in June with a repeat PSA at that time.  I will follow-up on the results of this blood work and we will plan to see him back thereafter in December 2024 with a repeat PSA again at that visit.  I recommend he obtain repeat PSAs every 6 months for continued surveillance.    Peter Morton MD  4/23/2024,1:42 PM    Portions of the record may have been created with voice recognition software.  Occasional wrong word or \"sound a like\" substitutions may have occurred due to the inherent limitations of voice recognition software.  Read the chart carefully and recognize, using context, where substitutions have occurred.      "

## 2024-05-06 LAB
25(OH)D3+25(OH)D2 SERPL-MCNC: 55 NG/ML (ref 30–100)
ALBUMIN SERPL-MCNC: 4.1 G/DL (ref 3.5–5.7)
ALBUMIN/CREAT UR: 15.9
ALP SERPL-CCNC: 65 U/L (ref 35–120)
ALT SERPL-CCNC: 21 U/L
ANION GAP SERPL CALCULATED.3IONS-SCNC: 9 MMOL/L (ref 3–11)
AST SERPL-CCNC: 26 U/L
BASOPHILS # BLD AUTO: 0 THOU/CMM (ref 0–0.1)
BASOPHILS NFR BLD AUTO: 0 %
BILIRUB SERPL-MCNC: 0.5 MG/DL (ref 0.2–1)
BUN SERPL-MCNC: 18 MG/DL (ref 7–28)
CALCIUM SERPL-MCNC: 9.6 MG/DL (ref 8.5–10.1)
CHLORIDE SERPL-SCNC: 107 MMOL/L (ref 100–109)
CO2 SERPL-SCNC: 28 MMOL/L (ref 21–31)
CREAT SERPL-MCNC: 1.09 MG/DL (ref 0.53–1.3)
CREAT UR-MCNC: 81.9 MG/DL (ref 50–200)
CYTOLOGY CMNT CVX/VAG CYTO-IMP: NORMAL
DIFFERENTIAL METHOD BLD: ABNORMAL
EOSINOPHIL # BLD AUTO: 0.1 THOU/CMM (ref 0–0.5)
EOSINOPHIL NFR BLD AUTO: 2 %
ERYTHROCYTE [DISTWIDTH] IN BLOOD BY AUTOMATED COUNT: 12.9 % (ref 12–16)
GFR/BSA.PRED SERPLBLD CYS-BASED-ARV: 68 ML/MIN/{1.73_M2}
GLUCOSE SERPL-MCNC: 91 MG/DL (ref 65–99)
HCT VFR BLD AUTO: 37 % (ref 37–48)
HGB BLD-MCNC: 13.1 G/DL (ref 12.5–17)
LDLC SERPL DIRECT ASSAY-MCNC: 85 MG/DL
LYMPHOCYTES # BLD AUTO: 1.3 THOU/CMM (ref 1–3)
LYMPHOCYTES NFR BLD AUTO: 25 %
MCH RBC QN AUTO: 35.3 PG (ref 27–36)
MCHC RBC AUTO-ENTMCNC: 35.6 G/DL (ref 32–37)
MCV RBC AUTO: 99 FL (ref 80–100)
MICROALBUMIN UR-MCNC: 1.3 MG/DL
MONOCYTES # BLD AUTO: 0.4 THOU/CMM (ref 0.3–1)
MONOCYTES NFR BLD AUTO: 9 %
NEUTROPHILS # BLD AUTO: 3.3 THOU/CMM (ref 1.8–7.8)
NEUTROPHILS NFR BLD AUTO: 64 %
PLATELET # BLD AUTO: 207 THOU/CMM (ref 140–350)
PMV BLD REES-ECKER: 8.7 FL (ref 7.5–11.3)
POTASSIUM SERPL-SCNC: 4.4 MMOL/L (ref 3.5–5.2)
PROT SERPL-MCNC: 6.9 G/DL (ref 6.3–8.3)
RBC # BLD AUTO: 3.73 MILL/CMM (ref 4–5.4)
SODIUM SERPL-SCNC: 144 MMOL/L (ref 135–145)
TRIGL SERPL-MCNC: 140 MG/DL
URATE SERPL-MCNC: 6.3 MG/DL (ref 3.5–7)
WBC # BLD AUTO: 5.1 THOU/CMM (ref 4–10.5)

## 2024-05-22 DIAGNOSIS — F41.9 ANXIETY: ICD-10-CM

## 2024-05-22 RX ORDER — ESCITALOPRAM OXALATE 10 MG/1
TABLET ORAL
Qty: 90 TABLET | Refills: 2 | Status: SHIPPED | OUTPATIENT
Start: 2024-05-22

## 2024-05-26 NOTE — PROGRESS NOTES
UROLOGY PROGRESS NOTE   Patient Identifiers: Dm Hyatt (MRN [id number])  Date of Service: 5/26/2024    Subjective:   78-year-old man history of grade group 3 prostate cancer.  Biopsy last year showing 4 positive cores of 4+3 equal 7.  He had a TURP as well as a UroLift in the past.  MRI was PI-RADS 2.  He agreed to ADT but declined definitive radiation thus far.  PSA 0.08.  Reason for visit: Prostate cancer follow-up    Objective:     VITALS:    Vitals:    11/29/23 1300   BP: 160/80   Pulse: 72   SpO2: 96%     AUA SYMPTOM SCORE      Flowsheet Row Most Recent Value   AUA SYMPTOM SCORE    How often have you had a sensation of not emptying your bladder completely after you finished urinating? 3 (P)     How often have you had to urinate again less than two hours after you finished urinating? 5 (P)     How often have you found you stopped and started again several times when you urinate? 2 (P)     How often have you found it difficult to postpone urination? 4 (P)     How often have you had a weak urinary stream? 3 (P)     How often have you had to push or strain to begin urination? 1 (P)     How many times did you most typically get up to urinate from the time you went to bed at night until the time you got up in the morning? 5 (P)     Quality of Life: If you were to spend the rest of your life with your urinary condition just the way it is now, how would you feel about that? 3 (P)     AUA SYMPTOM SCORE 23 (P)               LABS:  Lab Results   Component Value Date    HGB 13.1 05/06/2024    HCT 37.0 05/06/2024    WBC 5.1 05/06/2024     05/06/2024   ]    Lab Results   Component Value Date     (H) 09/26/2015    K 4.4 05/06/2024     05/06/2024    CO2 28 05/06/2024    BUN 18 05/06/2024    CREATININE 1.09 05/06/2024    CALCIUM 9.6 05/06/2024    GLUCOSE 99 12/18/2019   ]        INPATIENT MEDS:    Current Outpatient Medications:     acetaminophen (TYLENOL) 325 mg tablet, Take 2 tablets (650 mg total) by  "mouth every 4 (four) hours as needed for mild pain, Disp: 30 tablet, Rfl: 0    albuterol (PROVENTIL HFA,VENTOLIN HFA) 90 mcg/act inhaler, Inhale 2 puffs every 6 (six) hours as needed, Disp: , Rfl:     amLODIPine (NORVASC) 5 mg tablet, TAKE 1 TAB WHEN  OR ABOVE, Disp: , Rfl:     aspirin 81 MG tablet, Take 81 mg by mouth, Disp: , Rfl:     candesartan (ATACAND) 8 MG tablet, , Disp: , Rfl:     diltiazem (CARDIZEM CD) 240 mg 24 hr capsule, every evening, Disp: , Rfl:     Multiple Vitamins-Minerals (CENTRUM SILVER PO), Take by mouth, Disp: , Rfl:     Nexlizet 180-10 MG TABS, every evening, Disp: , Rfl:     nitroglycerin (NITROSTAT) 0.4 mg SL tablet, Place 1 tablet under the tongue every 5 (five) minutes as needed, Disp: , Rfl:     Omega-3 Fatty Acids (FISH OIL) 1200 MG CAPS, Take by mouth, Disp: , Rfl:     oxybutynin (DITROPAN) 5 mg tablet, TAKE 1 TABLET BY MOUTH AT BEDTIME, Disp: 90 tablet, Rfl: 3    tamsulosin (FLOMAX) 0.4 mg, TAKE 1 CAPSULE BY MOUTH  DAILY WITH DINNER, Disp: 90 capsule, Rfl: 3    terazosin (HYTRIN) 2 mg capsule, daily at bedtime, Disp: , Rfl:     warfarin (COUMADIN) 5 mg tablet, Take 5 mg by mouth, Disp: , Rfl:     escitalopram (LEXAPRO) 10 mg tablet, TAKE 1 TABLET BY MOUTH DAILY, Disp: 90 tablet, Rfl: 2      Physical Exam:   /80 (BP Location: Left arm, Patient Position: Sitting, Cuff Size: Standard)   Pulse 72   Ht 5' 10\" (1.778 m)   Wt 90.7 kg (200 lb)   SpO2 96%   BMI 28.70 kg/m²   GEN: no acute distress    RESP: breathing comfortably with no accessory muscle use    ABD: soft, non-tender, non-distended   INCISION:    EXT: no significant peripheral edema     RADIOLOGY:   None    Assessment:   #1.  Prostate cancer    Plan:   -Agreeable to definitive radiation  -I made an ambulatory referral to radiation oncology  -Follow-up with urology postradiation in for his next injection if required  -          "

## 2024-06-18 NOTE — PROGRESS NOTES
UROLOGY PROGRESS NOTE   Patient Identifiers: Dm Hyatt (MRN [id number])  Date of Service: 6/18/2024    Subjective:   78-year-old man history of grade group 3 [place] 4+3 equal 7 prostate cancer.  He had a TURP and UroLift.  He is completed radiation on March 7 and had ADT.  Follow-up PSA is pending.  No troubles postradiation.    Reason for visit: Prostate cancer follow-up    Objective:     VITALS:    There were no vitals filed for this visit.        LABS:  Lab Results   Component Value Date    HGB 13.1 05/06/2024    HCT 37.0 05/06/2024    WBC 5.1 05/06/2024     05/06/2024   ]    Lab Results   Component Value Date     (H) 09/26/2015    K 4.4 05/06/2024    K 4.4 05/06/2024     05/06/2024     05/06/2024    CO2 28 05/06/2024    CO2 28 05/06/2024    BUN 18 05/06/2024    BUN 18 05/06/2024    CREATININE 1.09 05/06/2024    CREATININE 1.09 05/06/2024    CALCIUM 9.6 05/06/2024    CALCIUM 9.6 05/06/2024    GLUCOSE 99 12/18/2019   ]        INPATIENT MEDS:    Current Outpatient Medications:     acetaminophen (TYLENOL) 325 mg tablet, Take 2 tablets (650 mg total) by mouth every 4 (four) hours as needed for mild pain, Disp: 30 tablet, Rfl: 0    albuterol (PROVENTIL HFA,VENTOLIN HFA) 90 mcg/act inhaler, Inhale 2 puffs every 6 (six) hours as needed, Disp: , Rfl:     amLODIPine (NORVASC) 5 mg tablet, TAKE 1 TAB WHEN  OR ABOVE, Disp: , Rfl:     aspirin 81 MG tablet, Take 81 mg by mouth, Disp: , Rfl:     candesartan (ATACAND) 8 MG tablet, , Disp: , Rfl:     diltiazem (CARDIZEM CD) 240 mg 24 hr capsule, every evening, Disp: , Rfl:     escitalopram (LEXAPRO) 10 mg tablet, TAKE 1 TABLET BY MOUTH DAILY, Disp: 90 tablet, Rfl: 2    Multiple Vitamins-Minerals (CENTRUM SILVER PO), Take by mouth, Disp: , Rfl:     Nexlizet 180-10 MG TABS, every evening, Disp: , Rfl:     nitroglycerin (NITROSTAT) 0.4 mg SL tablet, Place 1 tablet under the tongue every 5 (five) minutes as needed, Disp: , Rfl:     Omega-3  Fatty Acids (FISH OIL) 1200 MG CAPS, Take by mouth, Disp: , Rfl:     oxybutynin (DITROPAN) 5 mg tablet, TAKE 1 TABLET BY MOUTH AT BEDTIME, Disp: 90 tablet, Rfl: 3    tamsulosin (FLOMAX) 0.4 mg, TAKE 1 CAPSULE BY MOUTH  DAILY WITH DINNER, Disp: 90 capsule, Rfl: 3    terazosin (HYTRIN) 2 mg capsule, daily at bedtime, Disp: , Rfl:     warfarin (COUMADIN) 5 mg tablet, Take 5 mg by mouth, Disp: , Rfl:       Physical Exam:   There were no vitals taken for this visit.  GEN: no acute distress    RESP: breathing comfortably with no accessory muscle use    ABD: soft, non-tender, non-distended   INCISION:    EXT: no significant peripheral edema     RADIOLOGY:   none     Assessment:   #1.  Prostate cancer    Plan:   -He will get a PSA now and in 6 months prior to his next appointment  -  -  -

## 2024-06-19 ENCOUNTER — OFFICE VISIT (OUTPATIENT)
Dept: UROLOGY | Facility: CLINIC | Age: 79
End: 2024-06-19
Payer: MEDICARE

## 2024-06-19 ENCOUNTER — APPOINTMENT (OUTPATIENT)
Dept: LAB | Facility: AMBULARY SURGERY CENTER | Age: 79
End: 2024-06-19
Payer: MEDICARE

## 2024-06-19 VITALS
HEART RATE: 75 BPM | SYSTOLIC BLOOD PRESSURE: 120 MMHG | WEIGHT: 194 LBS | OXYGEN SATURATION: 96 % | DIASTOLIC BLOOD PRESSURE: 60 MMHG | HEIGHT: 71 IN | BODY MASS INDEX: 27.16 KG/M2

## 2024-06-19 DIAGNOSIS — C61 PROSTATE CANCER (HCC): ICD-10-CM

## 2024-06-19 DIAGNOSIS — C61 PROSTATE CANCER (HCC): Primary | ICD-10-CM

## 2024-06-19 LAB — PSA SERPL-MCNC: 0.01 NG/ML (ref 0–4)

## 2024-06-19 PROCEDURE — 36415 COLL VENOUS BLD VENIPUNCTURE: CPT

## 2024-06-19 PROCEDURE — 84153 ASSAY OF PSA TOTAL: CPT

## 2024-06-19 PROCEDURE — 99213 OFFICE O/P EST LOW 20 MIN: CPT | Performed by: PHYSICIAN ASSISTANT

## 2024-07-23 DIAGNOSIS — N13.8 BPH WITH OBSTRUCTION/LOWER URINARY TRACT SYMPTOMS: ICD-10-CM

## 2024-07-23 DIAGNOSIS — N40.1 BPH WITH OBSTRUCTION/LOWER URINARY TRACT SYMPTOMS: ICD-10-CM

## 2024-07-23 RX ORDER — TAMSULOSIN HYDROCHLORIDE 0.4 MG/1
CAPSULE ORAL
Qty: 90 CAPSULE | Refills: 1 | Status: SHIPPED | OUTPATIENT
Start: 2024-07-23

## 2024-07-24 ENCOUNTER — RA CDI HCC (OUTPATIENT)
Dept: OTHER | Facility: HOSPITAL | Age: 79
End: 2024-07-24

## 2024-07-24 LAB
ALBUMIN SERPL-MCNC: 4 G/DL (ref 3.5–5.7)
ALP SERPL-CCNC: 66 U/L (ref 35–120)
ALT SERPL-CCNC: 21 U/L
ANION GAP SERPL CALCULATED.3IONS-SCNC: 9 MMOL/L (ref 3–11)
AST SERPL-CCNC: 27 U/L
BILIRUB SERPL-MCNC: 0.8 MG/DL (ref 0.2–1)
BUN SERPL-MCNC: 15 MG/DL (ref 7–28)
CALCIUM SERPL-MCNC: 9.5 MG/DL (ref 8.5–10.1)
CHLORIDE SERPL-SCNC: 105 MMOL/L (ref 100–109)
CHOLEST SERPL-MCNC: 134 MG/DL
CHOLEST/HDLC SERPL: 4.6 {RATIO}
CO2 SERPL-SCNC: 28 MMOL/L (ref 21–31)
CREAT SERPL-MCNC: 1.08 MG/DL (ref 0.53–1.3)
CYTOLOGY CMNT CVX/VAG CYTO-IMP: NORMAL
EST. AVERAGE GLUCOSE BLD GHB EST-MCNC: 114 MG/DL
GFR/BSA.PRED SERPLBLD CYS-BASED-ARV: 69 ML/MIN/{1.73_M2}
GLUCOSE SERPL-MCNC: 96 MG/DL (ref 65–99)
HBA1C MFR BLD: 5.6 %
HDLC SERPL-MCNC: 29 MG/DL (ref 23–92)
LDLC SERPL CALC-MCNC: 57 MG/DL
NONHDLC SERPL-MCNC: 105 MG/DL
POTASSIUM SERPL-SCNC: 4.9 MMOL/L (ref 3.5–5.2)
PROT SERPL-MCNC: 6.9 G/DL (ref 6.3–8.3)
SODIUM SERPL-SCNC: 142 MMOL/L (ref 135–145)
TRIGL SERPL-MCNC: 242 MG/DL

## 2024-08-02 ENCOUNTER — OFFICE VISIT (OUTPATIENT)
Dept: INTERNAL MEDICINE CLINIC | Facility: CLINIC | Age: 79
End: 2024-08-02
Payer: MEDICARE

## 2024-08-02 VITALS
OXYGEN SATURATION: 96 % | SYSTOLIC BLOOD PRESSURE: 126 MMHG | HEART RATE: 79 BPM | WEIGHT: 196 LBS | HEIGHT: 71 IN | DIASTOLIC BLOOD PRESSURE: 80 MMHG | BODY MASS INDEX: 27.44 KG/M2 | TEMPERATURE: 97.1 F

## 2024-08-02 DIAGNOSIS — E78.00 HYPERCHOLESTEROLEMIA: ICD-10-CM

## 2024-08-02 DIAGNOSIS — C61 PROSTATE CANCER (HCC): ICD-10-CM

## 2024-08-02 DIAGNOSIS — Z13.0 SCREENING FOR DEFICIENCY ANEMIA: ICD-10-CM

## 2024-08-02 DIAGNOSIS — F41.9 ANXIETY: ICD-10-CM

## 2024-08-02 DIAGNOSIS — E78.1 HYPERTRIGLYCERIDEMIA: ICD-10-CM

## 2024-08-02 DIAGNOSIS — I25.10 CAD IN NATIVE ARTERY: ICD-10-CM

## 2024-08-02 DIAGNOSIS — N18.2 STAGE 2 CHRONIC KIDNEY DISEASE: ICD-10-CM

## 2024-08-02 DIAGNOSIS — N20.0 RENAL LITHIASIS: ICD-10-CM

## 2024-08-02 DIAGNOSIS — I10 ESSENTIAL HYPERTENSION: Primary | ICD-10-CM

## 2024-08-02 DIAGNOSIS — R73.09 ABNORMAL GLUCOSE: ICD-10-CM

## 2024-08-02 PROCEDURE — 99214 OFFICE O/P EST MOD 30 MIN: CPT | Performed by: INTERNAL MEDICINE

## 2024-08-02 PROCEDURE — G2211 COMPLEX E/M VISIT ADD ON: HCPCS | Performed by: INTERNAL MEDICINE

## 2024-08-02 NOTE — PROGRESS NOTES
Ambulatory Visit  Name: Dm Hyatt Jr.      : 1945      MRN: [id number]  Encounter Provider: Jordi Hart MD  Encounter Date: 2024   Encounter department: [facility] INTERNAL MEDICINE    Assessment & Plan   1. Screening for deficiency anemia  -     CBC and differential; Future  -     CBC and differential  2. Abnormal glucose  Assessment & Plan:  Glucose assessment today confirms reading under 100 continue caution with consumption of excessive carbohydrates and concentrated sugars.  Orders:  -     Comprehensive metabolic panel; Future  -     Comprehensive metabolic panel  3. Hypertriglyceridemia  Assessment & Plan:  Triglycerides reviewed with the patient and recommend continuation of diet controlled consumption of concentrated sugars and excessive carbohydrates and continue omega-3 fatty acids.  Orders:  -     Lipid panel; Future  -     Lipid panel  4. Prostate cancer (HCC)  -     PSA Total, Diagnostic; Future  -     PSA Total, Diagnostic  5. CAD in native artery  Assessment & Plan:  Assessment of the patient's heart disease indicates no recent angina or palpitation type symptoms.  Recommend the continuation of his current low-dose aspirin as well as cholesterol management.  Most recent note from cardiology has been reviewed.  6. Essential hypertension  Assessment & Plan:  Hypertension assessment shows blood pressure to be in good range.  Recommend continuation of current cardiac/hypertensive medications.  7. Stage 2 chronic kidney disease  Assessment & Plan:  Lab Results   Component Value Date    EGFR 69 2024    EGFR 69 2024    EGFR 61 2024    CREATININE 1.08 2024    CREATININE 1.08 2024    CREATININE 1.20 2024   Chronic stage II kidney disease remains stable avoid nonsteroidal anti-inflammatories and also avoid dehydration state recommend follow-up in 4 to 6 months  8. Anxiety  Assessment & Plan:  Anxiety symptoms remain quite stable continue  "Lexapro at 10 mg on a daily basis  9. Hypercholesterolemia  Assessment & Plan:  Lipid profile reviewed with the patient recommend continuation of omega-3 fatty acid is along with low-cholesterol diet  10. Renal lithiasis  Assessment & Plan:  Recent visit to emergency room for hematuria found to have a right side ureteral stone hematuria has resolved not clear if he actually has passed the stone recommend patient contact urology for follow-up assessment         History of Present Illness     This 78-year-old gentleman presents today for a routine follow-up visit.  He indicates that he has been feeling well but did have a visit to the emergency room with hematuria and was found to have a stone in his right kidney.  He was discharged and reports that the hematuria has resolved but he is not sure if he is actually passed the stone.  I recommended that he contact his urologist for follow-up evaluation.    Patient has had no recent chest pains or palpitations nor any peripheral edema.  He is followed by [facility] cardiology for his history of atrial fibrillation and coronary artery disease.      Review of Systems   Genitourinary:  Positive for hematuria.   All other systems reviewed and are negative.    Past Medical History:   Diagnosis Date    Cancer (HCC)     Clotting disorder (HCC)     Coronary artery disease     follows with [facility] cardiology    CPAP (continuous positive airway pressure) dependence     Elevated liver enzymes     Hyperlipidemia     Hypertension     Kidney stone     Prostate cancer (HCC)     Shortness of breath     per pt not new\" doctors not sure cause\" mostly with exertion\" follows with cardio regularly\"    Sleep apnea     TIA (transient ischemic attack)     Wears dentures     upper     Past Surgical History:   Procedure Laterality Date    BACK SURGERY      CARDIAC LOOP RECORDER      and removed approx 2 years ago    CARDIAC SURGERY      CORONARY ARTERY BYPASS GRAFT      x4    OTHER SURGICAL HISTORY   "    Urolift    MN CYSTO INSERTION TRANSPROSTATIC IMPLANT SINGLE N/A 02/10/2023    Procedure: CYSTOSCOPY WITH INSERTION UROLIFT;  Surgeon: Paul Castro MD;  Location: [facility] MAIN OR;  Service: Urology    MN PLMT INTERSTITIAL DEV RADIAT TX PROSTATE 1/MULT N/A 01/10/2024    Procedure: INSERTION OF FIDUCIAL MARKER , SPACEAOR;  Surgeon: Dm Hoang MD;  Location:  MAIN OR;  Service: Urology    PROSTATE BIOPSY      PROSTATE SURGERY  many years ago    ROTATOR CUFF REPAIR      SPINE SURGERY      TRANSURETHRAL RESECTION OF PROSTATE      Approx. 15 years prior     Family History   Problem Relation Age of Onset    Heart failure Father     Heart failure Mother     Stroke Brother     Colon cancer Son     No Known Problems Daughter     No Known Problems Daughter      Social History     Tobacco Use    Smoking status: Never    Smokeless tobacco: Never   Vaping Use    Vaping status: Never Used   Substance and Sexual Activity    Alcohol use: Never    Drug use: Never    Sexual activity: Not Currently     Partners: Female     Comment: defer     Current Outpatient Medications on File Prior to Visit   Medication Sig    acetaminophen (TYLENOL) 325 mg tablet Take 2 tablets (650 mg total) by mouth every 4 (four) hours as needed for mild pain    candesartan (ATACAND) 8 MG tablet     diltiazem (CARDIZEM CD) 240 mg 24 hr capsule every evening    escitalopram (LEXAPRO) 10 mg tablet TAKE 1 TABLET BY MOUTH DAILY    Multiple Vitamins-Minerals (CENTRUM SILVER PO) Take by mouth    Nexlizet 180-10 MG TABS every evening    Omega-3 Fatty Acids (FISH OIL) 1200 MG CAPS Take by mouth    oxybutynin (DITROPAN) 5 mg tablet TAKE 1 TABLET BY MOUTH AT BEDTIME    tamsulosin (FLOMAX) 0.4 mg TAKE 1 CAPSULE BY MOUTH DAILY  WITH DINNER    terazosin (HYTRIN) 2 mg capsule daily at bedtime    amLODIPine (NORVASC) 5 mg tablet TAKE 1 TAB WHEN  OR ABOVE    aspirin 81 MG tablet Take 81 mg by mouth    nitroglycerin (NITROSTAT) 0.4 mg SL tablet Place 1  "tablet under the tongue every 5 (five) minutes as needed    warfarin (COUMADIN) 5 mg tablet Take 5 mg by mouth    [DISCONTINUED] albuterol (PROVENTIL HFA,VENTOLIN HFA) 90 mcg/act inhaler Inhale 2 puffs every 6 (six) hours as needed     Allergies   Allergen Reactions    Statins Other (See Comments)     Elevated CPK     Immunization History   Administered Date(s) Administered    COVID-19 MODERNA VACC 0.25 ML IM BOOSTER 10/28/2021    COVID-19 MODERNA VACC 0.5 ML IM 01/23/2021, 02/19/2021, 10/28/2021    INFLUENZA 11/07/2007, 11/01/2018, 11/09/2019, 11/08/2020, 11/05/2021, 11/07/2021, 11/06/2022, 11/05/2023    Influenza, seasonal, injectable 11/07/2007    Pneumococcal Conjugate 13-Valent 12/22/2016    Pneumococcal Polysaccharide PPV23 08/16/2018    Zoster 12/18/2013    Zoster Vaccine Recombinant 10/16/2020, 12/18/2020     Objective     /80   Pulse 79   Temp (!) 97.1 °F (36.2 °C) (Tympanic)   Ht 5' 10.5\" (1.791 m)   Wt 88.9 kg (196 lb)   SpO2 96%   BMI 27.73 kg/m²     Physical Exam  Vitals and nursing note reviewed.   Constitutional:       General: He is not in acute distress.     Appearance: He is well-developed.   HENT:      Head: Normocephalic and atraumatic.   Eyes:      Conjunctiva/sclera: Conjunctivae normal.   Cardiovascular:      Rate and Rhythm: Normal rate. Rhythm irregular.      Heart sounds: No murmur heard.  Pulmonary:      Effort: Pulmonary effort is normal. No respiratory distress.      Breath sounds: Normal breath sounds. No wheezing, rhonchi or rales.   Abdominal:      Palpations: Abdomen is soft.      Tenderness: There is no abdominal tenderness.   Musculoskeletal:         General: No swelling.      Cervical back: Neck supple.      Right lower leg: No edema.      Left lower leg: No edema.   Skin:     General: Skin is warm and dry.      Capillary Refill: Capillary refill takes less than 2 seconds.   Neurological:      Mental Status: He is alert.   Psychiatric:         Mood and Affect: Mood " normal.

## 2024-08-02 NOTE — ASSESSMENT & PLAN NOTE
Hypertension assessment shows blood pressure to be in good range.  Recommend continuation of current cardiac/hypertensive medications.

## 2024-08-02 NOTE — ASSESSMENT & PLAN NOTE
Glucose assessment today confirms reading under 100 continue caution with consumption of excessive carbohydrates and concentrated sugars.

## 2024-08-02 NOTE — ASSESSMENT & PLAN NOTE
Lipid profile reviewed with the patient recommend continuation of omega-3 fatty acid is along with low-cholesterol diet

## 2024-08-02 NOTE — ASSESSMENT & PLAN NOTE
Lab Results   Component Value Date    EGFR 69 07/24/2024    EGFR 69 07/24/2024    EGFR 61 07/18/2024    CREATININE 1.08 07/24/2024    CREATININE 1.08 07/24/2024    CREATININE 1.20 07/18/2024   Chronic stage II kidney disease remains stable avoid nonsteroidal anti-inflammatories and also avoid dehydration state recommend follow-up in 4 to 6 months

## 2024-08-02 NOTE — ASSESSMENT & PLAN NOTE
Assessment of the patient's heart disease indicates no recent angina or palpitation type symptoms.  Recommend the continuation of his current low-dose aspirin as well as cholesterol management.  Most recent note from cardiology has been reviewed.

## 2024-08-02 NOTE — ASSESSMENT & PLAN NOTE
Triglycerides reviewed with the patient and recommend continuation of diet controlled consumption of concentrated sugars and excessive carbohydrates and continue omega-3 fatty acids.

## 2024-08-02 NOTE — ASSESSMENT & PLAN NOTE
Recent visit to emergency room for hematuria found to have a right side ureteral stone hematuria has resolved not clear if he actually has passed the stone recommend patient contact urology for follow-up assessment

## 2024-08-05 DIAGNOSIS — N20.0 NEPHROLITHIASIS: Primary | ICD-10-CM

## 2024-08-07 ENCOUNTER — HOSPITAL ENCOUNTER (OUTPATIENT)
Dept: RADIOLOGY | Age: 79
Discharge: HOME | End: 2024-08-07
Payer: MEDICARE

## 2024-08-07 DIAGNOSIS — N20.0 NEPHROLITHIASIS: ICD-10-CM

## 2024-08-07 PROCEDURE — 76775 US EXAM ABDO BACK WALL LIM: CPT

## 2024-08-09 ENCOUNTER — TELEPHONE (OUTPATIENT)
Dept: UROLOGY | Facility: CLINIC | Age: 79
End: 2024-08-09

## 2024-08-09 NOTE — TELEPHONE ENCOUNTER
----- Message from Zandra Muñoz PA-C sent at 8/9/2024  2:12 PM EDT -----  US negative for obstructing stones or hydronephrosis. Does have small upper pole stone. Follow up as scheduled

## 2024-09-25 ENCOUNTER — TELEPHONE (OUTPATIENT)
Age: 79
End: 2024-09-25

## 2024-10-07 ENCOUNTER — TELEPHONE (OUTPATIENT)
Age: 79
End: 2024-10-07

## 2024-10-07 NOTE — TELEPHONE ENCOUNTER
Please reach out to patient to schedule TCM for him. I called office clinical and clerical line, no answer on either line.   Patient would like for Dr. Hart.      Please advise.

## 2024-10-09 ENCOUNTER — TRANSITIONAL CARE MANAGEMENT (OUTPATIENT)
Age: 79
End: 2024-10-09

## 2024-10-10 NOTE — PROGRESS NOTES
Transition of Care Visit  Name: Dm Hyatt Jr.      : 1945      MRN: [id number]  Encounter Provider: Parker Aguilar DO  Encounter Date: 10/11/2024   Encounter department: [facility] INTERNAL MEDICINE    Assessment & Plan  Renal lithiasis  Patient presents to clinic today for TCM visit following recent hospitalization at [facility] from 2024 through 10/02/2024 for kidney stone requiring stent placement.    He was found to have a right hydroureteronephrosis secondary to a 4 mm kidney stone.    He was seen by urology and is now status post ureteral stent placement on .    Patient improved and was stable for discharge on 10/02/2024 to home  Patient reports resolution of right flank pain since discharge from hospital.  He has some mild discomfort from stent placement.  He is compliant with medications from discharge.  Denies hematuria.  He is aware of his follow-up appointment with urology for cystoscopy and stent removal on 11/15/2024.       LOUIS (acute kidney injury) (HCC)  Patient had LOUIS at presentation to hospital.  Patient's baseline creatinine approximately 1.0.    At presentation to ED on 2024 patient's creatinine elevated to 1.75.  This creatinine elevation was found to be secondary to urinary tract obstruction from kidney stone.  Patient is now status post stent placement.  Patient's creatinine was improving at time of discharge to 1.3.  Will repeat BMP to ensure full resolution of LOUIS.    Orders:    Basic metabolic panel; Future    Basic metabolic panel    Essential hypertension  Patient's blood pressure acceptable in clinic today at 126/82.  Prior to hospitalization and LOUIS patient was taking diltiazem 240 mg daily and candesartan 8 mg daily.  Of note,  Patient has been prescribed amlodipine 5 mg daily in the past but he states he has never needed to take this medication is currently not taking it.  Patient's candesartan was stopped during hospital  admission because of LOUIS.  Will confirm resolution of LOUIS with BMP.  If LOUIS resolves, will instruct patient to resume candesartan.         History of Present Illness     Transitional Care Management Review:   Dm Hyatt Jr. is a 79 y.o. male here for TCM follow up.     During the TCM phone call patient stated:  TCM Call       Date and time call was made  10/9/2024  3:14 PM    Hospital care reviewed  Records reviewed    Patient was hospitialized at  [facility]    Date of Admission  09/28/24    Date of discharge  10/02/24    Diagnosis  flank pain    Disposition  Home    Were the patients medications reviewed and updated  Yes          TCM Call       Scheduled for follow up?  Yes    Did you obtain your prescribed medications  Yes    Do you need help managing your prescriptions or medications  No    Is transportation to your appointment needed  No    I have advised the patient to call PCP with any new or worsening symptoms  Agnes Mackenzie MA          Mr. Dm Hyatt is an 79-year-old male with past medical history of hypertension, CAD, TIA, SKYLER, stage II CKD, history of prostate cancer.  Patient presents to clinic today for TCM visit following recent hospitalization at [facility] from 09/28/2024 through 10/02/2024 for kidney stone requiring stent placement.  Patient initially presented to the hospital on 09/28/2024 with complaint of flank pain, nausea, chills.  He was found to have a right hydroureteronephrosis secondary to a 4 mm kidney stone.  He was seen by urology and is now status post ureteral stent placement on 09/29.  Patient was previously on antibiotics but urine culture was performed and was negative and so antibiotics were discontinued.  Additional workup for any infectious etiology was unremarkable.  Patient improved and was stable for discharge on 10/02/2024 to home.    Of note, patient was provided a work excuse note at his request at the end of his hospitalization.   It was documented that patient specifically wanted a work excuse note for his daughter stating that she was providing care for his wife.  Hospitalist attempted to provide a caregiver letter however informed patient that it was not applicable to the situation as it is not involving the care for this specific patient.  Is recommended to patient that, if he is comfortable sharing the information, can share it with his daughter and she can explain his situation.  Hospitalist updated and spoke to daughter during course of hospitalization and she did not make such a request during this conversations.  Ultimately, patient's daughter was not able to make to the hospital and so hospitalist was unable to complete the caregiver document.  It was explained to the patient that it would be unethical for the hospitalist to provide the document regarding care for person that the physician is not providing care for nor familiar with.  Patient was instructed to contact the primary care physician of either the wife or the daughter as they would be more familiar with the situation and their treatment teams and may be able to provide the letter that he is requesting.  Unfortunately, patient was unsatisfied with this outcome and he reported that he was asking for a simple task with many witnesses that he was going to leave back.    Since patient's discharge she reports general improvement.  He has mild discomfort from stent placement but states that his severe right flank pain is now resolved.  He has been having some persistent fatigue.  Reviewed patient's current medications and follow-up appointments.  He is aware of scheduled follow-up appointments with urology and further procedures.      Review of Systems   Constitutional:  Positive for fatigue. Negative for chills and fever.   HENT:  Negative for congestion and rhinorrhea.    Respiratory:  Negative for cough, shortness of breath and wheezing.    Cardiovascular:  Negative for chest  "pain and leg swelling.   Gastrointestinal:  Negative for abdominal pain, constipation, diarrhea, nausea and vomiting.   Genitourinary:  Negative for difficulty urinating and dysuria.   Musculoskeletal:  Negative for arthralgias and back pain.   Skin:  Negative for rash and wound.   Neurological:  Negative for dizziness, weakness and headaches.   Psychiatric/Behavioral:  Negative for agitation, behavioral problems and confusion.      Objective     /82   Pulse 76   Resp 16   Ht 5' 10.5\" (1.791 m)   Wt 88.9 kg (196 lb)   SpO2 97%   BMI 27.73 kg/m²     Physical Exam  Constitutional:       Appearance: Normal appearance.   HENT:      Right Ear: External ear normal.      Left Ear: External ear normal.   Eyes:      Extraocular Movements: Extraocular movements intact.      Conjunctiva/sclera: Conjunctivae normal.      Pupils: Pupils are equal, round, and reactive to light.   Cardiovascular:      Rate and Rhythm: Normal rate and regular rhythm.      Pulses: Normal pulses.      Heart sounds: Normal heart sounds. No murmur heard.  Pulmonary:      Effort: Pulmonary effort is normal. No respiratory distress.      Breath sounds: Normal breath sounds. No wheezing, rhonchi or rales.   Abdominal:      General: Abdomen is flat. Bowel sounds are normal. There is no distension.      Palpations: Abdomen is soft.      Tenderness: There is no abdominal tenderness.   Musculoskeletal:      Right lower leg: No edema.      Left lower leg: No edema.   Skin:     General: Skin is warm and dry.   Neurological:      Mental Status: He is alert and oriented to person, place, and time.   Psychiatric:         Mood and Affect: Mood normal.         Behavior: Behavior normal.         Thought Content: Thought content normal.       Medications have been reviewed by provider in current encounter    "

## 2024-10-11 ENCOUNTER — OFFICE VISIT (OUTPATIENT)
Age: 79
End: 2024-10-11
Payer: MEDICARE

## 2024-10-11 VITALS
DIASTOLIC BLOOD PRESSURE: 82 MMHG | HEART RATE: 76 BPM | SYSTOLIC BLOOD PRESSURE: 126 MMHG | RESPIRATION RATE: 16 BRPM | BODY MASS INDEX: 27.44 KG/M2 | WEIGHT: 196 LBS | HEIGHT: 71 IN | OXYGEN SATURATION: 97 %

## 2024-10-11 DIAGNOSIS — I10 ESSENTIAL HYPERTENSION: ICD-10-CM

## 2024-10-11 DIAGNOSIS — N20.0 RENAL LITHIASIS: Primary | ICD-10-CM

## 2024-10-11 DIAGNOSIS — N17.9 AKI (ACUTE KIDNEY INJURY) (HCC): ICD-10-CM

## 2024-10-11 PROCEDURE — 99495 TRANSJ CARE MGMT MOD F2F 14D: CPT | Performed by: STUDENT IN AN ORGANIZED HEALTH CARE EDUCATION/TRAINING PROGRAM

## 2024-10-11 RX ORDER — AMOXICILLIN 250 MG
2 CAPSULE ORAL
COMMUNITY
Start: 2024-10-02 | End: 2024-11-01

## 2024-10-11 RX ORDER — HYOSCYAMINE SULFATE 0.125 MG
0.12 TABLET ORAL EVERY 4 HOURS PRN
COMMUNITY
Start: 2024-10-02 | End: 2024-11-01

## 2024-10-11 RX ORDER — PHENAZOPYRIDINE HYDROCHLORIDE 200 MG/1
200 TABLET, FILM COATED ORAL 3 TIMES DAILY PRN
COMMUNITY
Start: 2024-10-02

## 2024-10-11 RX ORDER — ONDANSETRON 4 MG/1
4 TABLET, ORALLY DISINTEGRATING ORAL EVERY 8 HOURS PRN
COMMUNITY
Start: 2024-07-18

## 2024-10-11 RX ORDER — MECLIZINE HCL 12.5 MG 12.5 MG/1
12.5-25 TABLET ORAL 3 TIMES DAILY PRN
COMMUNITY
Start: 2024-08-24 | End: 2025-08-24

## 2024-10-11 RX ORDER — KETOROLAC TROMETHAMINE 10 MG/1
10 TABLET, FILM COATED ORAL EVERY 6 HOURS PRN
COMMUNITY
Start: 2024-07-18

## 2024-10-11 RX ORDER — CEPHALEXIN 500 MG/1
CAPSULE ORAL
COMMUNITY
Start: 2024-07-19

## 2024-10-11 NOTE — ASSESSMENT & PLAN NOTE
Patient presents to clinic today for TCM visit following recent hospitalization at [facility] from 09/28/2024 through 10/02/2024 for kidney stone requiring stent placement.    He was found to have a right hydroureteronephrosis secondary to a 4 mm kidney stone.    He was seen by urology and is now status post ureteral stent placement on 09/29.    Patient improved and was stable for discharge on 10/02/2024 to home  Patient reports resolution of right flank pain since discharge from hospital.  He has some mild discomfort from stent placement.  He is compliant with medications from discharge.  Denies hematuria.  He is aware of his follow-up appointment with urology for cystoscopy and stent removal on 11/15/2024.

## 2024-10-11 NOTE — ASSESSMENT & PLAN NOTE
Patient's blood pressure acceptable in clinic today at 126/82.  Prior to hospitalization and LOUIS patient was taking diltiazem 240 mg daily and candesartan 8 mg daily.  Of note,  Patient has been prescribed amlodipine 5 mg daily in the past but he states he has never needed to take this medication is currently not taking it.  Patient's candesartan was stopped during hospital admission because of LOUIS.  Will confirm resolution of LOUIS with BMP.  If LOUIS resolves, will instruct patient to resume candesartan.

## 2024-10-28 ENCOUNTER — RA CDI HCC (OUTPATIENT)
Dept: OTHER | Facility: HOSPITAL | Age: 79
End: 2024-10-28

## 2024-11-01 ENCOUNTER — CONSULT (OUTPATIENT)
Age: 79
End: 2024-11-01
Payer: MEDICARE

## 2024-11-01 VITALS
SYSTOLIC BLOOD PRESSURE: 128 MMHG | BODY MASS INDEX: 26.96 KG/M2 | TEMPERATURE: 98.3 F | HEART RATE: 84 BPM | HEIGHT: 71 IN | DIASTOLIC BLOOD PRESSURE: 74 MMHG | WEIGHT: 192.6 LBS | OXYGEN SATURATION: 93 %

## 2024-11-01 DIAGNOSIS — Z01.818 PREOP EXAMINATION: Primary | ICD-10-CM

## 2024-11-01 DIAGNOSIS — F41.9 ANXIETY: ICD-10-CM

## 2024-11-01 DIAGNOSIS — R42 VERTIGO: ICD-10-CM

## 2024-11-01 DIAGNOSIS — I10 ESSENTIAL HYPERTENSION: ICD-10-CM

## 2024-11-01 PROCEDURE — 99214 OFFICE O/P EST MOD 30 MIN: CPT | Performed by: INTERNAL MEDICINE

## 2024-11-01 PROCEDURE — G2211 COMPLEX E/M VISIT ADD ON: HCPCS | Performed by: INTERNAL MEDICINE

## 2024-11-01 RX ORDER — ESCITALOPRAM OXALATE 20 MG/1
20 TABLET ORAL DAILY
Qty: 90 TABLET | Refills: 2 | Status: SHIPPED | OUTPATIENT
Start: 2024-11-01

## 2024-11-01 NOTE — ASSESSMENT & PLAN NOTE
Patient has vertiginous symptoms no indication of irritation to the ear canal no indication of central source of his symptoms.  A referral to physical therapy for vertebral therapy.    Orders:    Ambulatory Referral to Physical Therapy; Future

## 2024-11-01 NOTE — ASSESSMENT & PLAN NOTE
Blood pressure assessment today confirms good control of hypertension continue Atacand at 8 mg daily

## 2024-11-01 NOTE — ASSESSMENT & PLAN NOTE
Examination today indicates the patient is medically stable to proceed with this retraction procedure of impacted stone in the right ureter.

## 2024-11-01 NOTE — ASSESSMENT & PLAN NOTE
Caregiver burnout symptoms are apparent patient has increase in anxiety and stress levels taking care of his wife with advanced dementia.  No relief is when he goes to work for half a day or if he goes out has a member of the family stay with his wife.  Recommend increase in Lexapro from 10 mg a day to 20 mg daily.    Orders:    escitalopram (LEXAPRO) 20 mg tablet; Take 1 tablet (20 mg total) by mouth daily

## 2024-11-01 NOTE — PROGRESS NOTES
Ambulatory Visit  Name: Dm Hyatt Jr.      : 1945      MRN: [id number]  Encounter Provider: Jordi Hart MD  Encounter Date: 2024   Encounter department: [facility] INTERNAL MEDICINE    Assessment & Plan  Anxiety  Caregiver burnout symptoms are apparent patient has increase in anxiety and stress levels taking care of his wife with advanced dementia.  No relief is when he goes to work for half a day or if he goes out has a member of the family stay with his wife.  Recommend increase in Lexapro from 10 mg a day to 20 mg daily.    Orders:    escitalopram (LEXAPRO) 20 mg tablet; Take 1 tablet (20 mg total) by mouth daily    Vertigo  Patient has vertiginous symptoms no indication of irritation to the ear canal no indication of central source of his symptoms.  A referral to physical therapy for vertebral therapy.    Orders:    Ambulatory Referral to Physical Therapy; Future    Essential hypertension  Blood pressure assessment today confirms good control of hypertension continue Atacand at 8 mg daily         Preop examination  Examination today indicates the patient is medically stable to proceed with this retraction procedure of impacted stone in the right ureter.              History of Present Illness     This pleasant 79-year-old gentleman returns to our office today for preop clearance prior to procedure for retrieval of impacted stone in his right ureter.  Indicates that he does have some discomfort in the bladder does occasionally see some blood mixed in with his urine.  At the present time he has a stent in place.    Review of the patient's patient indicates no indication of current cardiac symptoms he is able to work outside in his yard with no evidence of angina palpitations or shortness of breath.  Most recent blood work appears to be in a stable range.    He does indicate that he has been under increased levels of stress taking care of his wife who is has advanced  dementia.  She requires constant monitoring.  His only relief from caregiving responsibilities is when he goes to work for half a day or he has somebody from the family come in to stay with his wife.    He does relate that he has been having some intermittent vertigo type symptoms.  They are most prevalent with change in position from sitting to standing or getting out of bed.  Symptoms seem to indicate a possible vertiginous condition.    Pre-op Exam  Surgery: Stone retrieval from the right ureter  Anticipated Date of Surgery: 11/15/2024  Surgeon: Graham    Previous history of bleeding disorders or clots?: No  Previous Anesthesia reaction?: No  Prolonged steroid use in the last 6 months?: No    Assessment of Cardiac Risk:   - Unstable or severe angina or MI in the last 6 weeks or history of stent placement in the last year?: No   - Decompensated heart failure (e.g. New onset heart failure, NYHA  Class IV heart failure, or worsening existing heart failure)?: No  - Significant arrhythmias such as high grade AV block, symptomatic ventricular arrhythmia, newly recognized ventricular tachycardia, supraventricular tachycardia with resting heart rate >100, or symptomatic bradycardia?: No  - Severe heart valve disease including aortic stenosis or symptomatic mitral stenosis?: No      Pre-operative Risk Factors:  Elevated-risk surgery: No    History of cerebrovascular disease: Yes  History of ischemic heart disease: Yes    Pre-operative treatment with insulin: No  Pre-operative creatinine >2 mg/dL: No    History of congestive heart failure: No    Medications of Perioperative Concern:   Anti-platelet (aspirin, clopidogrel, ticagrelor, prasugrel, cilostazol, dipyridamole) and Anti-coagulants (Coumadin, Xarelto, Pradaxa, Eliquis, Lixiana)    Review of Systems   HENT:          Vertigo symptoms   Psychiatric/Behavioral:  The patient is nervous/anxious.      Past Medical History:   Diagnosis Date    Cancer (HCC)     Clotting  "disorder (HCC)     Coronary artery disease     follows with [facility] cardiology    CPAP (continuous positive airway pressure) dependence     Elevated liver enzymes     Hyperlipidemia     Hypertension     Kidney stone     Prostate cancer (HCC)     Shortness of breath     per pt not new\" doctors not sure cause\" mostly with exertion\" follows with cardio regularly\"    Sleep apnea     TIA (transient ischemic attack)     Wears dentures     upper     Past Surgical History:   Procedure Laterality Date    BACK SURGERY      CARDIAC LOOP RECORDER      and removed approx 2 years ago    CARDIAC SURGERY      CORONARY ARTERY BYPASS GRAFT      x4    OTHER SURGICAL HISTORY      Urolift    IL CYSTO INSERTION TRANSPROSTATIC IMPLANT SINGLE N/A 02/10/2023    Procedure: CYSTOSCOPY WITH INSERTION UROLIFT;  Surgeon: Paul Castro MD;  Location: [facility] MAIN OR;  Service: Urology    IL PLMT INTERSTITIAL DEV RADIAT TX PROSTATE 1/MULT N/A 01/10/2024    Procedure: INSERTION OF FIDUCIAL MARKER , SPACEAOR;  Surgeon: Dm Hoang MD;  Location:  MAIN OR;  Service: Urology    PROSTATE BIOPSY      PROSTATE SURGERY  many years ago    ROTATOR CUFF REPAIR      SPINE SURGERY      TRANSURETHRAL RESECTION OF PROSTATE      Approx. 15 years prior     Family History   Problem Relation Age of Onset    Heart failure Father     Heart failure Mother     Stroke Brother     Colon cancer Son     No Known Problems Daughter     No Known Problems Daughter      Social History     Tobacco Use    Smoking status: Never    Smokeless tobacco: Never   Vaping Use    Vaping status: Never Used   Substance and Sexual Activity    Alcohol use: Never    Drug use: Never    Sexual activity: Not Currently     Partners: Female     Comment: defer     Current Outpatient Medications on File Prior to Visit   Medication Sig    acetaminophen (TYLENOL) 325 mg tablet Take 2 tablets (650 mg total) by mouth every 4 (four) hours as needed for mild pain    candesartan (ATACAND) 8 MG " tablet     cephalexin (KEFLEX) 500 mg capsule take 1 capsule by mouth four times a day for 7 days    diltiazem (CARDIZEM CD) 240 mg 24 hr capsule every evening    hyoscyamine (ANASPAZ,LEVSIN) 0.125 MG tablet Take 0.125 mg by mouth every 4 (four) hours as needed    ketorolac (TORADOL) 10 mg tablet Take 10 mg by mouth every 6 (six) hours as needed    meclizine (ANTIVERT) 12.5 MG tablet Take 12.5-25 mg by mouth Three times daily as needed    Multiple Vitamins-Minerals (CENTRUM SILVER PO) Take by mouth    Nexlizet 180-10 MG TABS every evening    Omega-3 Fatty Acids (FISH OIL) 1200 MG CAPS Take by mouth    ondansetron (ZOFRAN-ODT) 4 mg disintegrating tablet Take 4 mg by mouth every 8 (eight) hours as needed    oxybutynin (DITROPAN) 5 mg tablet TAKE 1 TABLET BY MOUTH AT BEDTIME    phenazopyridine (PYRIDIUM) 200 mg tablet Take 200 mg by mouth 3 (three) times a day as needed for bladder spasms    senna-docusate sodium (SENOKOT S) 8.6-50 mg per tablet Take 2 tablets by mouth    tamsulosin (FLOMAX) 0.4 mg TAKE 1 CAPSULE BY MOUTH DAILY  WITH DINNER    terazosin (HYTRIN) 2 mg capsule daily at bedtime    warfarin (COUMADIN) 5 mg tablet Take 5 mg by mouth    [DISCONTINUED] aspirin 81 MG tablet Take 81 mg by mouth    [DISCONTINUED] escitalopram (LEXAPRO) 10 mg tablet TAKE 1 TABLET BY MOUTH DAILY    amLODIPine (NORVASC) 5 mg tablet TAKE 1 TAB WHEN  OR ABOVE (Patient not taking: Reported on 10/11/2024)    nitroglycerin (NITROSTAT) 0.4 mg SL tablet Place 1 tablet under the tongue every 5 (five) minutes as needed (Patient not taking: Reported on 10/11/2024)     Allergies   Allergen Reactions    Statins Other (See Comments)     Elevated CPK     Immunization History   Administered Date(s) Administered    COVID-19 MODERNA VACC 0.25 ML IM BOOSTER 10/28/2021    COVID-19 MODERNA VACC 0.5 ML IM 01/23/2021, 02/19/2021, 10/28/2021    INFLUENZA 11/07/2007, 11/01/2018, 11/09/2019, 11/08/2020, 11/05/2021, 11/07/2021, 11/06/2022, 11/05/2023  "   Influenza, seasonal, injectable 11/07/2007    Pneumococcal Conjugate 13-Valent 12/22/2016    Pneumococcal Polysaccharide PPV23 08/16/2018    Zoster 12/18/2013    Zoster Vaccine Recombinant 10/16/2020, 12/18/2020     Objective     /74   Pulse 84   Temp 98.3 °F (36.8 °C) (Tympanic)   Ht 5' 10.5\" (1.791 m)   Wt 87.4 kg (192 lb 9.6 oz)   SpO2 93%   BMI 27.24 kg/m²     Physical Exam  Vitals and nursing note reviewed.   Constitutional:       General: He is not in acute distress.     Appearance: He is well-developed.   HENT:      Head: Normocephalic and atraumatic.   Eyes:      Conjunctiva/sclera: Conjunctivae normal.   Cardiovascular:      Rate and Rhythm: Normal rate and regular rhythm.      Heart sounds: No murmur heard.  Pulmonary:      Effort: Pulmonary effort is normal. No respiratory distress.      Breath sounds: Normal breath sounds. No wheezing, rhonchi or rales.   Abdominal:      Palpations: Abdomen is soft.      Tenderness: There is no abdominal tenderness.   Musculoskeletal:         General: No swelling.      Cervical back: Neck supple.      Right lower leg: No edema.      Left lower leg: No edema.   Skin:     General: Skin is warm and dry.      Capillary Refill: Capillary refill takes less than 2 seconds.   Neurological:      General: No focal deficit present.      Mental Status: He is alert. Mental status is at baseline.   Psychiatric:         Mood and Affect: Mood normal.         "

## 2024-11-08 ENCOUNTER — IMMUNIZATIONS (OUTPATIENT)
Age: 79
End: 2024-11-08
Payer: MEDICARE

## 2024-11-08 ENCOUNTER — EVALUATION (OUTPATIENT)
Dept: PHYSICAL THERAPY | Facility: REHABILITATION | Age: 79
End: 2024-11-08
Payer: MEDICARE

## 2024-11-08 DIAGNOSIS — Z23 ENCOUNTER FOR IMMUNIZATION: Primary | ICD-10-CM

## 2024-11-08 DIAGNOSIS — R42 VERTIGO: Primary | ICD-10-CM

## 2024-11-08 PROCEDURE — 97112 NEUROMUSCULAR REEDUCATION: CPT

## 2024-11-08 PROCEDURE — 97162 PT EVAL MOD COMPLEX 30 MIN: CPT

## 2024-11-08 PROCEDURE — 90662 IIV NO PRSV INCREASED AG IM: CPT | Performed by: INTERNAL MEDICINE

## 2024-11-08 PROCEDURE — G0008 ADMIN INFLUENZA VIRUS VAC: HCPCS | Performed by: INTERNAL MEDICINE

## 2024-11-08 NOTE — PROGRESS NOTES
"                                   PT Evaluation        Past Medical History:   Diagnosis Date    Cancer (HCC)     Clotting disorder (HCC)     Coronary artery disease     follows with [facility]ology    CPAP (continuous positive airway pressure) dependence     Elevated liver enzymes     Hyperlipidemia     Hypertension     Kidney stone     Prostate cancer (HCC)     Shortness of breath     per pt not new\" doctors not sure cause\" mostly with exertion\" follows with cardio regularly\"    Sleep apnea     TIA (transient ischemic attack)     Wears dentures     upper       Allergies   Allergen Reactions    Statins Other (See Comments)     Elevated CPK       POC expires Unit limit Auth Expiration date PT/OT + Visit Limit?   12 weeks - 2025 na  bomn         Visit/Unit Tracking  AUTH Status:  Date          bomn Used 1         Remaining             Pertinent Findings:      POC End Date: 2025                                                                                    Test / Measure                                    Today's date: 2024  Patient name: Dm Hyatt Jr.  : 1945  MRN: [id number]  Referring provider: Jordi Hart, *  Dx:   Encounter Diagnosis     ICD-10-CM    1. Vertigo  R42 Ambulatory Referral to Physical Therapy            Assessment  Assessment details: Patient is a 79 y.o. Male who presents to skilled outpatient PT with c/o dizziness. Cervical spine integrity intact per normal and negative results of mVBI, Sharp Timur, and Alar Stability Tests respectively. Patient displayed L upward torisonal nystagmus with positional assessment indicating likely L Posterior Canalithiasis. Trialed L Epley Maneuver today with Excellent results and eventual resolution of symptoms by final repetition. Educated the patient on the anatomy of the inner ear, potential for residual dizziness for up to 48 hours following session, and to seek higher level of care if symptoms worsen or change " and He was in good verbal understanding. He will benefit from skilled outpatient PT in order to reduce dizziness symptoms and return to PLOF.    Patient verbalized understanding of POC.    Please contact me if you have any questions or recommendations. Thank you for the referral and the opportunity to share in Dm Hyatt Jr.'s care.      Cut off score   All date taken from [facility] Neuro Section or Rehab Measures    DGI:  MDC for Vestibular Disorders: 4 points  MDC for Geriatrics/Community Dwelling Older Adults: 3 Points  Falls risk cut off: <19/24    FGA:  MCID: 4 points  Geriatrics/Community Dwelling Older Adults: </= 22/30 fall risk  Geriatrics/Community Dwelling Older Adults: </= 20/30 unexplained falls in the next 6 months  Parkinsons: </= 18/30 fall risk    mCTSIB (normed on ages 20-60, lower number is less sway or better static balance)  Eyes open firm surface (norm 0.21-0.48)  Eyes closed firm surface (norm 0.48-0.99)  Eyes open foam surface (norm 0.38-0.71)  Eyes closed foam surface (norm 0.70-2.22)    DHI:  0-39: low perception of handicap  40-69: moderate perception of handicap  : severe perception of handicap  > 60: increased risk for falls    Joint Position Error Testing (JPET):  > 4.5 degrees: abnormal joint proprioception        Impairments: Abnormal coordination, abnormal gait, abnormal muscle tone, abnormal or restricted ROM, activity intolerance, impaired balance, impaired physical strength, lacks appropriate HEP, poor posture, poor body mechanics, pain with function, safety issue, abnormal movement  Understanding of Dx/Px/POC: excellent  Prognosis: excellent      Goals    Vestibular Short Term Goals (4 weeks):  - Patient will display improved cervical spine STM by 50% to encourage improved AROM during functional tasks  - Patient will be independent with simple HEP  - Patient will tolerate 60 seconds of oculomotor exercises with minimal increase in symptoms  - Patient will demonstrate 10%  decrease in symptom severity scoring with independent use of modalities  - Patient will demonstrate improved soft tissue density t/o cervical region with independent self-release  - Patient will be able to tolerate 30 seconds with eyes closed on foam surface without any loss of balance demonstrating improvement in vestibular system  - Patient will improve with DGI by 3 points per MDC to promote improved safety with dynamic tasks  - Patient will improve FGA score by 4 points per MDC to promote improved safety with dynamic tasks    Vestibular Long Term Goals (12 weeks):  - Patient will display decreased forward head and rounded shoulders to promote improved resting posture and cervical mobility  - Patient will be independent with complex HEP  - Patient will tolerate >=2 minutes of oculomotor exercises to facilitate return to reading and computer work  - Patient will report >= 50% improvement on symptom severity scoring  - Patient will demonstrate ability to perform HT in gait without veering  - Patient will be able to perform 15 minutes of aerobic activity at HR 70% max to facilitate return to sport/normal functional tasks  - Patient will demonstrate normalized soft tissue t/o  - Patient will increase FOTO score to greater than predicted value.   - Patient will score low risk for falls with DGI test with score of 19/24 or higher per current research data  - Patient will score low risk for falls with FGA test with score of 23/30 or higher per current research data  - Patient will report baseline dizziness of 1/10 or less   - Patient will report 2/10 dizziness or less with visual stimulating surround with duration of 2 minutes   - Patient will report subjective improvement to 90% or higher to promote return to PLOF  - Patient will complete work related tasks without exacerbation of symptoms in order to maximize function and promote return to work  - Patient will complete RTP protocol in order to promote return to sports  related tasks      Plan  Patient would benefit from: Skilled PT  Planned modality interventions: Biofeedback, Cryotherapy, TENS, Thermotherapy  Planned therapy interventions: Abdominal trunk stabilization, ADL training, balance, balance/WB training, breathing training, body mechanics training, coordination, flexibility, functional ROM exercises, gait training, HEP, manual therapy, Maldonado Taping, motor coordination training, neuromuscular re-education, patient education, postural training, strengthening, stretching, therapeutic activities, therapeutic exercises, work re-integration  Frequency:  f/u as needed  Duration in weeks: 12  Plan of Care beginning date: 11/8/2024  Plan of Care expiration date: 12 weeks - 1/31/2025  Treatment plan discussed with: patient        Subjective Evaluation    History of Present Illness  Mechanism of injury: 11/8/2024: Dm SOTELO Edmar Mccullough is a pleasant 79 y.o. male presenting to PT for ~1 year h/o dizziness.         Dizziness Subjective  How long does dizziness last: doesn't know, just closes eyes  How would you describe the dizziness: lightheaded, nauseous, spinning  Rolling in bed: No  Supine to/from sit: Yes  Recent hearing loss: No  Tinnitus: Yes  Aural fullness/ear pain: No  Vision changes: No  History of recent viral infections: No  History of migraines: No    Red Flag Screen  - Numbness: No  - Tingling: No  - Weakness: No  - Unilateral hearing loss: No  - Slurred speech: No  - Progressive hearing loss: No  - Tremors: No  - Poor coordination: No  - UMN signs: No  - LoC: No  - Rigidity: No  - Visual field loss: No  - Memory loss: No  - CN dysfunction: No  - Vertical nystagmus: No    PPPD Screen  - Symptoms present > 3 months? Yes  - Do your symptoms wax and wane? Yes  - Symptoms worsened by upright posture? Yes  - Symptoms worsened by AROM/PROM without regard to direction or position, and exposure to complex visual patterns? No  - Is there a precipitating factor, such as  another vestibular disorder or psychological disorder/stressful event? No  - Do symptoms cause significant distress or functional impairment? No  - Symptoms not better accounted for by another diagnosis? No    Pain  Current pain ratin/10  At best pain ratin/10  At worst pain ratin/10  Location: na  Aggravating factors: na    Treatments  Previous treatment: na  Current treatment: na  Diagnostic Testing: na      Objective     Vestibular Objective  Cervical Spine AROM:  - Flexion: minimal limitation no pain  - Extension: minimal limitation no pain  - R Rotation: minimal limitation no pain  - L Rotation: minimal limitation no pain  - R Lateral Flexion: minimal limitation no pain  - L Lateral Flexion: minimal limitation no pain    Integrity Testing  - mVBI: neg  - Sharp Timur: neg  - Alar Stability Test: neg  - Posture: fair  - Palpation: no TTP     Coordination Screen  - Dysmetria: neg  - Dysdiadochokinesia: neg    BPPV Screen  - L Fern-Hallpike: pos  - R Fern-Hallpike: neg  - L Horizontal Roll: neg  - R Horizontal Roll: neg  - L Log Roll: neg  - R Log Roll: neg        Outcome Measures Initial Eval  2024

## 2024-11-11 ENCOUNTER — OFFICE VISIT (OUTPATIENT)
Dept: PHYSICAL THERAPY | Facility: REHABILITATION | Age: 79
End: 2024-11-11
Payer: MEDICARE

## 2024-11-11 DIAGNOSIS — R42 VERTIGO: Primary | ICD-10-CM

## 2024-11-11 PROCEDURE — 97112 NEUROMUSCULAR REEDUCATION: CPT

## 2024-11-11 NOTE — PROGRESS NOTES
"Discharge Summary    Today's date: 2024  Patient name: Dm Hyatt Jr.  : 1945  MRN: [id number]  Referring provider: Jordi Hart, *  Dx:   Encounter Diagnosis     ICD-10-CM    1. Vertigo  R42           Start Time: 1130  Stop Time: 1153  Total time in clinic (min): 23 minutes    Subjective: Patient reports resolution of sx.      Objective: See treatment diary below      Assessment: Tolerated treatment well. Retested miguel-hallpike with (-) findings B. Taught Epley as component of HEP if sx are to return. Provided further education on pathoanatomy of vestibular system. Patient is able to manage all sx independently and demonstrates good understanding of and proper form with each of the exercises included in HEP. Patient has met all personal and established goals for PT and is deemed safe for d/c. POC to remain open for 30 days and patient instructed to contact PT for any change in status.        Plan: Patient to be d/c from PT.     Past Medical History:   Diagnosis Date    Cancer (HCC)     Clotting disorder (HCC)     Coronary artery disease     follows with [facility]ology    CPAP (continuous positive airway pressure) dependence     Elevated liver enzymes     Hyperlipidemia     Hypertension     Kidney stone     Prostate cancer (HCC)     Shortness of breath     per pt not new\" doctors not sure cause\" mostly with exertion\" follows with cardio regularly\"    Sleep apnea     TIA (transient ischemic attack)     Wears dentures     upper       Allergies   Allergen Reactions    Statins Other (See Comments)     Elevated CPK       POC expires Unit limit Auth Expiration date PT/OT + Visit Limit?   12 weeks - 2025 na  bomn         Visit/Unit Tracking  AUTH Status:  Date         bomn Used 1 2        Remaining             Pertinent Findings:      POC End Date: 2025                                                                                    Test / Measure                   "

## 2024-12-09 LAB
ALBUMIN SERPL-MCNC: 4.1 G/DL (ref 3.5–5.7)
ALP SERPL-CCNC: 76 U/L (ref 35–120)
ALT SERPL-CCNC: 20 U/L
ANION GAP SERPL CALCULATED.3IONS-SCNC: 8 MMOL/L (ref 3–11)
AST SERPL-CCNC: 28 U/L
BASOPHILS # BLD AUTO: 0 THOU/CMM (ref 0–0.1)
BASOPHILS NFR BLD AUTO: 0 %
BILIRUB SERPL-MCNC: 0.6 MG/DL (ref 0.2–1)
BUN SERPL-MCNC: 18 MG/DL (ref 7–28)
CALCIUM SERPL-MCNC: 9.5 MG/DL (ref 8.5–10.5)
CHLORIDE SERPL-SCNC: 107 MMOL/L (ref 100–109)
CHOLEST SERPL-MCNC: 159 MG/DL
CHOLEST/HDLC SERPL: 4.7 {RATIO}
CO2 SERPL-SCNC: 27 MMOL/L (ref 21–31)
CREAT SERPL-MCNC: 1.07 MG/DL (ref 0.53–1.3)
CYTOLOGY CMNT CVX/VAG CYTO-IMP: ABNORMAL
DIFFERENTIAL METHOD BLD: NORMAL
EOSINOPHIL # BLD AUTO: 0.1 THOU/CMM (ref 0–0.5)
EOSINOPHIL NFR BLD AUTO: 1 %
ERYTHROCYTE [DISTWIDTH] IN BLOOD BY AUTOMATED COUNT: 14 % (ref 12–16)
GFR/BSA.PRED SERPLBLD CYS-BASED-ARV: 70 ML/MIN/{1.73_M2}
GLUCOSE SERPL-MCNC: 113 MG/DL (ref 65–99)
HCT VFR BLD AUTO: 40.7 % (ref 37–48)
HDLC SERPL-MCNC: 34 MG/DL (ref 23–92)
HGB BLD-MCNC: 14 G/DL (ref 12.5–17)
LDLC SERPL CALC-MCNC: 90 MG/DL
LYMPHOCYTES # BLD AUTO: 1.3 THOU/CMM (ref 1–3)
LYMPHOCYTES NFR BLD AUTO: 24 %
MCH RBC QN AUTO: 32.6 PG (ref 27–36)
MCHC RBC AUTO-ENTMCNC: 34.4 G/DL (ref 32–37)
MCV RBC AUTO: 95 FL (ref 80–100)
MONOCYTES # BLD AUTO: 0.3 THOU/CMM (ref 0.3–1)
MONOCYTES NFR BLD AUTO: 6 %
NEUTROPHILS # BLD AUTO: 3.8 THOU/CMM (ref 1.8–7.8)
NEUTROPHILS NFR BLD AUTO: 69 %
NONHDLC SERPL-MCNC: 125 MG/DL
PLATELET # BLD AUTO: 253 THOU/CMM (ref 140–350)
PMV BLD REES-ECKER: 8.1 FL (ref 7.5–11.3)
POTASSIUM SERPL-SCNC: 4 MMOL/L (ref 3.5–5.2)
PROT SERPL-MCNC: 6.9 G/DL (ref 6.3–8.3)
RBC # BLD AUTO: 4.3 MILL/CMM (ref 4–5.4)
SL AMB PSA, TOTAL: 0.03 NG/ML
SODIUM SERPL-SCNC: 142 MMOL/L (ref 135–145)
TRIGL SERPL-MCNC: 175 MG/DL
WBC # BLD AUTO: 5.5 THOU/CMM (ref 4–10.5)

## 2024-12-30 ENCOUNTER — TELEPHONE (OUTPATIENT)
Age: 79
End: 2024-12-30

## 2024-12-31 DIAGNOSIS — N40.1 BPH WITH OBSTRUCTION/LOWER URINARY TRACT SYMPTOMS: ICD-10-CM

## 2024-12-31 DIAGNOSIS — N13.8 BPH WITH OBSTRUCTION/LOWER URINARY TRACT SYMPTOMS: ICD-10-CM

## 2024-12-31 RX ORDER — TAMSULOSIN HYDROCHLORIDE 0.4 MG/1
0.4 CAPSULE ORAL
Qty: 90 CAPSULE | Refills: 1 | Status: SHIPPED | OUTPATIENT
Start: 2024-12-31

## 2025-01-15 ENCOUNTER — OFFICE VISIT (OUTPATIENT)
Dept: UROLOGY | Facility: CLINIC | Age: 80
End: 2025-01-15
Payer: MEDICARE

## 2025-01-15 ENCOUNTER — NURSE TRIAGE (OUTPATIENT)
Age: 80
End: 2025-01-15

## 2025-01-15 VITALS
DIASTOLIC BLOOD PRESSURE: 60 MMHG | BODY MASS INDEX: 28.28 KG/M2 | HEIGHT: 71 IN | OXYGEN SATURATION: 97 % | WEIGHT: 202 LBS | SYSTOLIC BLOOD PRESSURE: 110 MMHG | HEART RATE: 94 BPM

## 2025-01-15 DIAGNOSIS — N32.81 OAB (OVERACTIVE BLADDER): Primary | ICD-10-CM

## 2025-01-15 DIAGNOSIS — N32.81 OAB (OVERACTIVE BLADDER): ICD-10-CM

## 2025-01-15 DIAGNOSIS — C61 PROSTATE CANCER (HCC): ICD-10-CM

## 2025-01-15 DIAGNOSIS — N20.0 NEPHROLITHIASIS: Primary | ICD-10-CM

## 2025-01-15 LAB
SL AMB  POCT GLUCOSE, UA: NORMAL
SL AMB LEUKOCYTE ESTERASE,UA: NORMAL
SL AMB POCT BILIRUBIN,UA: NORMAL
SL AMB POCT BLOOD,UA: NORMAL
SL AMB POCT CLARITY,UA: CLEAR
SL AMB POCT COLOR,UA: NORMAL
SL AMB POCT KETONES,UA: NORMAL
SL AMB POCT NITRITE,UA: NORMAL
SL AMB POCT PH,UA: 6
SL AMB POCT SPECIFIC GRAVITY,UA: 1025
SL AMB POCT URINE PROTEIN: NORMAL
SL AMB POCT UROBILINOGEN: NORMAL

## 2025-01-15 PROCEDURE — 81002 URINALYSIS NONAUTO W/O SCOPE: CPT | Performed by: PHYSICIAN ASSISTANT

## 2025-01-15 PROCEDURE — 87086 URINE CULTURE/COLONY COUNT: CPT | Performed by: PHYSICIAN ASSISTANT

## 2025-01-15 PROCEDURE — 99213 OFFICE O/P EST LOW 20 MIN: CPT | Performed by: PHYSICIAN ASSISTANT

## 2025-01-15 RX ORDER — TROSPIUM CHLORIDE 20 MG/1
20 TABLET, FILM COATED ORAL 2 TIMES DAILY
Qty: 60 TABLET | Refills: 3 | Status: SHIPPED | OUTPATIENT
Start: 2025-01-15

## 2025-01-15 RX ORDER — TROSPIUM CHLORIDE 20 MG/1
20 TABLET, FILM COATED ORAL 2 TIMES DAILY
Qty: 60 TABLET | Refills: 6 | Status: CANCELLED | OUTPATIENT
Start: 2025-01-15

## 2025-01-15 NOTE — TELEPHONE ENCOUNTER
Patient called the RX Refill Line. Message is being forwarded to the office.     Patient called back and states the medication was suppose to be sent to [facility] in [place] and nothing was sent over. Would like a call to know when it is sent. Nurse is at the house until 3pm for his wife and he cannot leave wife alone.    Please contact patient at [phone]

## 2025-01-15 NOTE — PROGRESS NOTES
UROLOGY PROGRESS NOTE   Patient Identifiers: Dm Hyatt (MRN [id number])  Date of Service: 1/15/2025    Subjective:   79-year-old man history of grade group 3 Sullivan 4+3 equal 7 prostate cancer.  He had a TURP and UroLift.  He completed radiation and ADT.  PSA 0.03.  He had a kidney stone over the summer with stents and was treated at [facility].  He has been having trouble with incontinence.  He also has hematuria.    Reason for visit: Prostate cancer follow-up    Objective:     VITALS:    There were no vitals filed for this visit.  AUA SYMPTOM SCORE      Flowsheet Row Most Recent Value   AUA SYMPTOM SCORE    How often have you had a sensation of not emptying your bladder completely after you finished urinating? 4 (P)     How often have you had to urinate again less than two hours after you finished urinating? 5 (P)     How often have you found you stopped and started again several times when you urinate? 1 (P)     How often have you found it difficult to postpone urination? 5 (P)     How often have you had a weak urinary stream? 3 (P)     How often have you had to push or strain to begin urination? 1 (P)     How many times did you most typically get up to urinate from the time you went to bed at night until the time you got up in the morning? 3 (P)     Quality of Life: If you were to spend the rest of your life with your urinary condition just the way it is now, how would you feel about that? 5 (P)     AUA SYMPTOM SCORE 22 (P)                LABS:  Lab Results   Component Value Date    HGB 12.9 12/14/2024    HCT 37.0 12/14/2024    WBC 5.5 12/09/2024     12/09/2024   ]    Lab Results   Component Value Date     (H) 09/26/2015    K 4.0 12/15/2024     12/15/2024    CO2 27 12/15/2024    BUN 11 12/15/2024    CREATININE 0.90 12/15/2024    CALCIUM 9.6 12/15/2024    GLUCOSE 99 12/18/2019   ]        INPATIENT MEDS:    Current Outpatient Medications:     acetaminophen (TYLENOL) 325 mg  tablet, Take 2 tablets (650 mg total) by mouth every 4 (four) hours as needed for mild pain, Disp: 30 tablet, Rfl: 0    amLODIPine (NORVASC) 5 mg tablet, TAKE 1 TAB WHEN  OR ABOVE (Patient not taking: Reported on 10/11/2024), Disp: , Rfl:     candesartan (ATACAND) 8 MG tablet, , Disp: , Rfl:     cephalexin (KEFLEX) 500 mg capsule, take 1 capsule by mouth four times a day for 7 days, Disp: , Rfl:     diltiazem (CARDIZEM CD) 240 mg 24 hr capsule, every evening, Disp: , Rfl:     escitalopram (LEXAPRO) 20 mg tablet, Take 1 tablet (20 mg total) by mouth daily, Disp: 90 tablet, Rfl: 2    ketorolac (TORADOL) 10 mg tablet, Take 10 mg by mouth every 6 (six) hours as needed, Disp: , Rfl:     meclizine (ANTIVERT) 12.5 MG tablet, Take 12.5-25 mg by mouth Three times daily as needed, Disp: , Rfl:     Multiple Vitamins-Minerals (CENTRUM SILVER PO), Take by mouth, Disp: , Rfl:     Nexlizet 180-10 MG TABS, every evening, Disp: , Rfl:     nitroglycerin (NITROSTAT) 0.4 mg SL tablet, Place 1 tablet under the tongue every 5 (five) minutes as needed (Patient not taking: Reported on 10/11/2024), Disp: , Rfl:     Omega-3 Fatty Acids (FISH OIL) 1200 MG CAPS, Take by mouth, Disp: , Rfl:     ondansetron (ZOFRAN-ODT) 4 mg disintegrating tablet, Take 4 mg by mouth every 8 (eight) hours as needed, Disp: , Rfl:     oxybutynin (DITROPAN) 5 mg tablet, TAKE 1 TABLET BY MOUTH AT BEDTIME, Disp: 90 tablet, Rfl: 3    phenazopyridine (PYRIDIUM) 200 mg tablet, Take 200 mg by mouth 3 (three) times a day as needed for bladder spasms, Disp: , Rfl:     tamsulosin (FLOMAX) 0.4 mg, Take 1 capsule (0.4 mg total) by mouth daily with dinner, Disp: 90 capsule, Rfl: 1    terazosin (HYTRIN) 2 mg capsule, daily at bedtime, Disp: , Rfl:     warfarin (COUMADIN) 5 mg tablet, Take 5 mg by mouth, Disp: , Rfl:       Physical Exam:   There were no vitals taken for this visit.  GEN: no acute distress    RESP: breathing comfortably with no accessory muscle use    ABD:  soft, non-tender, non-distended   INCISION:    EXT: no significant peripheral edema         RADIOLOGY:   none     Assessment:   #1.  Hematuria  #2.  Prostate cancer  #3.  Kidney stones    Plan:   -I sent his urine for culture I will wait to see if there is an infection since he is on warfarin  -I ordered a kidney and bladder ultrasound  -Will arrange for appropriate follow-up  -

## 2025-01-15 NOTE — TELEPHONE ENCOUNTER
"Answer Assessment - Initial Assessment Questions  1. REASON FOR CALL: \"What is the main reason for your call?\" or \"How can I best help you?\"       Patient called in stating he had an appointment with Y[facility] today and was told two medications would be sent to the pharmacy for his overactive bladder. No medications were sent over. Please advise.    Protocols used: Information Only Call - No Triage-Adult-[place]    "

## 2025-01-16 ENCOUNTER — TELEPHONE (OUTPATIENT)
Age: 80
End: 2025-01-16

## 2025-01-16 LAB — BACTERIA UR CULT: NORMAL

## 2025-01-16 NOTE — TELEPHONE ENCOUNTER
Patient called requesting refill for   trospium chloride (SANCTURA) 20 mg . Patient made aware medication was refilled on 01/15 for 60 with 3 refills to [facility]. Patient instructed to contact the pharmacy to obtain refills of medication. Patient verbalized understanding.

## 2025-01-16 NOTE — TELEPHONE ENCOUNTER
Called and spoke with Dm Hyatt Jr. And informed. States no longer taking the Oxybutynin. Verbalized understanding.

## 2025-01-31 LAB
INR PPP: 3.5
PROTHROMBIN TIME: 34.7 SEC (ref 12–14.6)
SL AMB PSA, TOTAL: 0.02 NG/ML

## 2025-02-01 LAB — LEGIONELLA SPEC CULT: NORMAL

## 2025-02-07 ENCOUNTER — TELEPHONE (OUTPATIENT)
Dept: UROLOGY | Facility: CLINIC | Age: 80
End: 2025-02-07

## 2025-02-07 DIAGNOSIS — N32.81 OAB (OVERACTIVE BLADDER): ICD-10-CM

## 2025-02-07 RX ORDER — TROSPIUM CHLORIDE 20 MG/1
20 TABLET, FILM COATED ORAL 2 TIMES DAILY
Qty: 180 TABLET | Refills: 2 | Status: SHIPPED | OUTPATIENT
Start: 2025-02-07

## 2025-05-24 DIAGNOSIS — N40.1 BPH WITH OBSTRUCTION/LOWER URINARY TRACT SYMPTOMS: ICD-10-CM

## 2025-05-24 DIAGNOSIS — N13.8 BPH WITH OBSTRUCTION/LOWER URINARY TRACT SYMPTOMS: ICD-10-CM

## 2025-05-27 RX ORDER — TAMSULOSIN HYDROCHLORIDE 0.4 MG/1
0.4 CAPSULE ORAL
Qty: 90 CAPSULE | Refills: 1 | Status: SHIPPED | OUTPATIENT
Start: 2025-05-27

## 2025-06-15 ENCOUNTER — HOSPITAL ENCOUNTER (EMERGENCY)
Facility: HOSPITAL | Age: 80
Discharge: HOME | End: 2025-06-15
Attending: EMERGENCY MEDICINE | Admitting: EMERGENCY MEDICINE
Payer: MEDICARE

## 2025-06-15 ENCOUNTER — APPOINTMENT (EMERGENCY)
Dept: CT IMAGING | Facility: HOSPITAL | Age: 80
End: 2025-06-15
Payer: MEDICARE

## 2025-06-15 VITALS
TEMPERATURE: 98 F | OXYGEN SATURATION: 96 % | SYSTOLIC BLOOD PRESSURE: 147 MMHG | RESPIRATION RATE: 18 BRPM | DIASTOLIC BLOOD PRESSURE: 69 MMHG | HEART RATE: 78 BPM

## 2025-06-15 DIAGNOSIS — N30.90 CYSTITIS: Primary | ICD-10-CM

## 2025-06-15 DIAGNOSIS — N39.0 UTI (URINARY TRACT INFECTION): ICD-10-CM

## 2025-06-15 LAB
ALBUMIN SERPL BCG-MCNC: 3.8 G/DL (ref 3.5–5)
ALP SERPL-CCNC: 64 U/L (ref 34–104)
ALT SERPL W P-5'-P-CCNC: 19 U/L (ref 7–52)
ANION GAP SERPL CALCULATED.3IONS-SCNC: 5 MMOL/L (ref 4–13)
AST SERPL W P-5'-P-CCNC: 22 U/L (ref 13–39)
BACTERIA UR QL AUTO: ABNORMAL /HPF
BASOPHILS # BLD AUTO: 0.02 THOUSANDS/ÂΜL (ref 0–0.1)
BASOPHILS NFR BLD AUTO: 0 % (ref 0–1)
BILIRUB SERPL-MCNC: 0.71 MG/DL (ref 0.2–1)
BILIRUB UR QL STRIP: NEGATIVE
BUN SERPL-MCNC: 16 MG/DL (ref 5–25)
CALCIUM SERPL-MCNC: 9.1 MG/DL (ref 8.4–10.2)
CHLORIDE SERPL-SCNC: 106 MMOL/L (ref 96–108)
CLARITY UR: ABNORMAL
CO2 SERPL-SCNC: 27 MMOL/L (ref 21–32)
COLOR UR: YELLOW
CREAT SERPL-MCNC: 1.07 MG/DL (ref 0.6–1.3)
EOSINOPHIL # BLD AUTO: 0.03 THOUSAND/ÂΜL (ref 0–0.61)
EOSINOPHIL NFR BLD AUTO: 0 % (ref 0–6)
ERYTHROCYTE [DISTWIDTH] IN BLOOD BY AUTOMATED COUNT: 12.3 % (ref 11.6–15.1)
GFR SERPL CREATININE-BSD FRML MDRD: 64 ML/MIN/1.73SQ M
GLUCOSE SERPL-MCNC: 124 MG/DL (ref 65–140)
GLUCOSE UR STRIP-MCNC: NEGATIVE MG/DL
HCT VFR BLD AUTO: 40.9 % (ref 36.5–49.3)
HGB BLD-MCNC: 14.3 G/DL (ref 12–17)
HGB UR QL STRIP.AUTO: ABNORMAL
IMM GRANULOCYTES # BLD AUTO: 0.03 THOUSAND/UL (ref 0–0.2)
IMM GRANULOCYTES NFR BLD AUTO: 0 % (ref 0–2)
INR PPP: 2.14 (ref 0.85–1.19)
KETONES UR STRIP-MCNC: NEGATIVE MG/DL
LEUKOCYTE ESTERASE UR QL STRIP: ABNORMAL
LYMPHOCYTES # BLD AUTO: 0.92 THOUSANDS/ÂΜL (ref 0.6–4.47)
LYMPHOCYTES NFR BLD AUTO: 10 % (ref 14–44)
MCH RBC QN AUTO: 33.9 PG (ref 26.8–34.3)
MCHC RBC AUTO-ENTMCNC: 35 G/DL (ref 31.4–37.4)
MCV RBC AUTO: 97 FL (ref 82–98)
MONOCYTES # BLD AUTO: 0.6 THOUSAND/ÂΜL (ref 0.17–1.22)
MONOCYTES NFR BLD AUTO: 6 % (ref 4–12)
NEUTROPHILS # BLD AUTO: 8.04 THOUSANDS/ÂΜL (ref 1.85–7.62)
NEUTS SEG NFR BLD AUTO: 84 % (ref 43–75)
NITRITE UR QL STRIP: NEGATIVE
NON-SQ EPI CELLS URNS QL MICRO: ABNORMAL /HPF
NRBC BLD AUTO-RTO: 0 /100 WBCS
PH UR STRIP.AUTO: 7 [PH]
PLATELET # BLD AUTO: 200 THOUSANDS/UL (ref 149–390)
PMV BLD AUTO: 9.8 FL (ref 8.9–12.7)
POTASSIUM SERPL-SCNC: 3.7 MMOL/L (ref 3.5–5.3)
PROT SERPL-MCNC: 6.8 G/DL (ref 6.4–8.4)
PROT UR STRIP-MCNC: ABNORMAL MG/DL
PROTHROMBIN TIME: 24.6 SECONDS (ref 12.3–15)
RBC # BLD AUTO: 4.22 MILLION/UL (ref 3.88–5.62)
RBC #/AREA URNS AUTO: ABNORMAL /HPF
SODIUM SERPL-SCNC: 138 MMOL/L (ref 135–147)
SP GR UR STRIP.AUTO: 1.01 (ref 1–1.03)
UROBILINOGEN UR STRIP-ACNC: <2 MG/DL
WBC # BLD AUTO: 9.64 THOUSAND/UL (ref 4.31–10.16)
WBC #/AREA URNS AUTO: ABNORMAL /HPF

## 2025-06-15 PROCEDURE — 99284 EMERGENCY DEPT VISIT MOD MDM: CPT

## 2025-06-15 PROCEDURE — 81001 URINALYSIS AUTO W/SCOPE: CPT

## 2025-06-15 PROCEDURE — 99285 EMERGENCY DEPT VISIT HI MDM: CPT | Performed by: EMERGENCY MEDICINE

## 2025-06-15 PROCEDURE — 80053 COMPREHEN METABOLIC PANEL: CPT

## 2025-06-15 PROCEDURE — 87086 URINE CULTURE/COLONY COUNT: CPT

## 2025-06-15 PROCEDURE — 74178 CT ABD&PLV WO CNTR FLWD CNTR: CPT

## 2025-06-15 PROCEDURE — 85025 COMPLETE CBC W/AUTO DIFF WBC: CPT

## 2025-06-15 PROCEDURE — 36415 COLL VENOUS BLD VENIPUNCTURE: CPT | Performed by: EMERGENCY MEDICINE

## 2025-06-15 PROCEDURE — 85610 PROTHROMBIN TIME: CPT | Performed by: EMERGENCY MEDICINE

## 2025-06-15 PROCEDURE — 87077 CULTURE AEROBIC IDENTIFY: CPT

## 2025-06-15 PROCEDURE — 87186 SC STD MICRODIL/AGAR DIL: CPT

## 2025-06-15 RX ORDER — CEFPODOXIME PROXETIL 200 MG/1
200 TABLET, FILM COATED ORAL 2 TIMES DAILY
Qty: 20 TABLET | Refills: 0 | Status: SHIPPED | OUTPATIENT
Start: 2025-06-15 | End: 2025-06-25

## 2025-06-15 RX ORDER — CEFPODOXIME PROXETIL 200 MG/1
200 TABLET, FILM COATED ORAL 2 TIMES DAILY WITH MEALS
Status: DISCONTINUED | OUTPATIENT
Start: 2025-06-15 | End: 2025-06-15

## 2025-06-15 RX ORDER — CEFPODOXIME PROXETIL 200 MG/1
200 TABLET, FILM COATED ORAL ONCE
Status: COMPLETED | OUTPATIENT
Start: 2025-06-15 | End: 2025-06-15

## 2025-06-15 RX ADMIN — IOHEXOL 90 ML: 350 INJECTION, SOLUTION INTRAVENOUS at 10:42

## 2025-06-15 RX ADMIN — CEFPODOXIME PROXETIL 200 MG: 200 TABLET, FILM COATED ORAL at 12:04

## 2025-06-15 NOTE — ED PROVIDER NOTES
Time reflects when diagnosis was documented in both MDM as applicable and the Disposition within this note       Time User Action Codes Description Comment    6/15/2025 11:44 AM Colton Milner Add [N30.90] Cystitis     6/15/2025 11:44 AM Colton Milner Add [N39.0] UTI (urinary tract infection)           ED Disposition       ED Disposition   Discharge    Condition   Stable    Date/Time   Sun Randall 15, 2025 11:45 AM    Comment   Dm Hyatt Jr. discharge to home/self care.                   Assessment & Plan       Medical Decision Making  Patient seen and examined. Physical exam is notable for no CVA tenderness, no abdominal or suprapubic tenderness. Remainder of exam is within normal limits.    Differential: UTI, kidney stone, bladder or renal mass, excess fluid intake    Appropriate labs and imaging ordered.  Patient states he is not in pain at time of this interview and that he only has pain when he is experiencing urgency    Labs notable for Innumerable white cells and red cells on urinalysis, INR 2.14, now in the therapeutic range. Imaging shows mild bladder wall thickening suggestive of cystitis. This supports a diagnosis of cystitis, UTI. All results discussed with patient, they express understanding.     Patient is agreeable to discharge home with cefpodoxime for UTI treatment and follow-up PCP. All questions answered and return precautions discussed.       Amount and/or Complexity of Data Reviewed  Labs: ordered. Decision-making details documented in ED Course.  Radiology: ordered.    Risk  Prescription drug management.        ED Course as of 06/15/25 1544   Sun Randall 15, 2025   1010 PVR 18ml   1017 WBC: 9.64   1018 RBC Urine(!): Innumerable   1018 WBC, UA(!): Innumerable   1022 Segmented %(!): 84  Left shift   1022 RBC Urine(!): Innumerable   1022 WBC, UA(!): Innumerable  WBCs may be present due to bleeding however coupled with left shift is concerning for UTI   1050 POCT INR(!): 2.14  In therapeutic range        Medications   iohexol (OMNIPAQUE) 350 MG/ML injection (MULTI-DOSE) 90 mL (90 mL Intravenous Given 6/15/25 1042)   cefpodoxime (VANTIN) tablet 200 mg (200 mg Oral Given 6/15/25 1204)       ED Risk Strat Scores                    No data recorded                            History of Present Illness       Chief Complaint   Patient presents with    Possible UTI     Pt reports ongoing issues with kidney stone and urinary issue. Pt states last night he started urinating blood. Pt denies n/v/ back pain or fevers.        Past Medical History[1]   Past Surgical History[2]   Family History[3]   Social History[4]   E-Cigarette/Vaping    E-Cigarette Use Never User       E-Cigarette/Vaping Substances    Nicotine No     THC No     CBD No     Flavoring No     Other No     Unknown No       I have reviewed and agree with the history as documented.     Dm Hyatt Jr. is a 79 y.o. male     They presented to the emergency department on Mary 15, 2025. Patient presents with:  Possible UTI: Pt reports ongoing issues with kidney stone and urinary issue. Pt states last night he started urinating blood. Pt denies n/v/ back pain or fevers.   .    The patient states that he has had increased urinary frequency and urgency over the last 24 hours.  He has both symptoms at baseline, he also has occasional hematuria chronically.  Over the last 24 hours he notes the symptoms have been worse and he has had to urinate every 10 to 15 minutes, this is made it difficult to sleep.  He notes the hematuria is usually brief and only present in 1 or 2 episodes of urination at a time however this has gone on since last night and has been present in every urination.  He has chronic continence issues and wears depends, he has noted blood clots up to 0.5 cm in his depends and in the toilet.  When he makes to the toilet he states there is very little urine in his bladder.  He has a history of prostate cancer.  He notes he drinks soda last night and  then drink extra water and lemonade to try and flush this out of his system and states that he drank more than he normally does and drink it much closer to bedtime than normal.  Patient denies dysuria, abdominal pain, fevers, chills, back pain, chest pain, shortness of breath, or any other complaint at this time.            History provided by:  Patient      Review of Systems   Constitutional:  Negative for chills, fatigue and fever.   HENT:  Negative for congestion, ear pain, postnasal drip, rhinorrhea, sinus pain and sore throat.    Eyes:  Negative for pain and visual disturbance.   Respiratory:  Negative for cough, chest tightness and shortness of breath.    Cardiovascular:  Negative for chest pain and palpitations.   Gastrointestinal:  Negative for abdominal pain, constipation, diarrhea, nausea and vomiting.   Genitourinary:  Positive for frequency, hematuria and urgency. Negative for difficulty urinating and dysuria.   Musculoskeletal:  Negative for back pain.   Skin:  Negative for color change and rash.   Neurological:  Negative for dizziness, seizures, syncope, weakness, light-headedness, numbness and headaches.   All other systems reviewed and are negative.          Objective       ED Triage Vitals   Temperature Pulse Blood Pressure Respirations SpO2 Patient Position - Orthostatic VS   06/15/25 0850 06/15/25 0850 06/15/25 0850 06/15/25 0850 06/15/25 0850 06/15/25 1148   98 °F (36.7 °C) 95 (!) 194/98 18 98 % Lying      Temp Source Heart Rate Source BP Location FiO2 (%) Pain Score    06/15/25 0850 06/15/25 1148 06/15/25 1148 -- 06/15/25 0850    Oral Monitor Left arm  No Pain      Vitals      Date and Time Temp Pulse SpO2 Resp BP Pain Score FACES Pain Rating User   06/15/25 1148 -- 78 96 % 18 147/69 -- -- DO   06/15/25 1100 -- 75 98 % 18 138/71 -- -- BEENA   06/15/25 0900 -- 87 96 % 18 171/84 -- -- BEENA   06/15/25 0850 98 °F (36.7 °C) 95 98 % 18 194/98 No Pain -- BEENA            Physical Exam  Constitutional:        General: He is not in acute distress.     Appearance: He is not diaphoretic.   HENT:      Head: Normocephalic and atraumatic.      Nose: No congestion or rhinorrhea.      Mouth/Throat:      Mouth: Mucous membranes are moist.      Pharynx: No oropharyngeal exudate.     Eyes:      General: No scleral icterus.      Cardiovascular:      Rate and Rhythm: Normal rate and regular rhythm.      Heart sounds: Normal heart sounds. No murmur heard.     No friction rub. No gallop.   Pulmonary:      Effort: No respiratory distress.      Breath sounds: Normal breath sounds. No wheezing, rhonchi or rales.   Abdominal:      General: Abdomen is flat. There is no distension.      Palpations: Abdomen is soft.      Tenderness: There is no abdominal tenderness. There is no right CVA tenderness, left CVA tenderness, guarding or rebound. Negative signs include Shukla's sign and McBurney's sign.   Lymphadenopathy:      Cervical: No cervical adenopathy.     Skin:     General: Skin is warm and dry.      Capillary Refill: Capillary refill takes less than 2 seconds.     Neurological:      General: No focal deficit present.      Mental Status: He is alert and oriented to person, place, and time.         Results Reviewed       Procedure Component Value Units Date/Time    Protime-INR [[id number]]  (Abnormal) Collected: 06/15/25 0955    Lab Status: Final result Specimen: Blood from Arm, Right Updated: 06/15/25 1033     Protime 24.6 seconds      INR 2.14    Narrative:      INR Therapeutic Range    Indication                                             INR Range      Atrial Fibrillation                                               2.0-3.0  Hypercoagulable State                                    2.0.2.3  Left Ventricular Asist Device                            2.0-3.0  Mechanical Heart Valve                                  -    Aortic(with afib, MI, embolism, HF, LA enlargement,    and/or coagulopathy)                                     2.0-3.0  (2.5-3.5)     Mitral                                                             2.5-3.5  Prosthetic/Bioprosthetic Heart Valve               2.0-3.0  Venous thromboembolism (VTE: VT, PE        2.0-3.0    Comprehensive metabolic panel [[id number]] Collected: 06/15/25 0955    Lab Status: Final result Specimen: Blood from Arm, Right Updated: 06/15/25 1026     Sodium 138 mmol/L      Potassium 3.7 mmol/L      Chloride 106 mmol/L      CO2 27 mmol/L      ANION GAP 5 mmol/L      BUN 16 mg/dL      Creatinine 1.07 mg/dL      Glucose 124 mg/dL      Calcium 9.1 mg/dL      AST 22 U/L      ALT 19 U/L      Alkaline Phosphatase 64 U/L      Total Protein 6.8 g/dL      Albumin 3.8 g/dL      Total Bilirubin 0.71 mg/dL      eGFR 64 ml/min/1.73sq m     Narrative:      National Kid[employer] guidelines for Chronic Kidney Disease (CKD):     Stage 1 with normal or high GFR (GFR > 90 mL/min/1.73 square meters)    Stage 2 Mild CKD (GFR = 60-89 mL/min/1.73 square meters)    Stage 3A Moderate CKD (GFR = 45-59 mL/min/1.73 square meters)    Stage 3B Moderate CKD (GFR = 30-44 mL/min/1.73 square meters)    Stage 4 Severe CKD (GFR = 15-29 mL/min/1.73 square meters)    Stage 5 End Stage CKD (GFR <15 mL/min/1.73 square meters)  Note: GFR calculation is accurate only with a steady state creatinine    CBC and differential [[id number]]  (Abnormal) Collected: 06/15/25 0955    Lab Status: Final result Specimen: Blood from Arm, Right Updated: 06/15/25 1015     WBC 9.64 Thousand/uL      RBC 4.22 Million/uL      Hemoglobin 14.3 g/dL      Hematocrit 40.9 %      MCV 97 fL      MCH 33.9 pg      MCHC 35.0 g/dL      RDW 12.3 %      MPV 9.8 fL      Platelets 200 Thousands/uL      nRBC 0 /100 WBCs      Segmented % 84 %      Immature Grans % 0 %      Lymphocytes % 10 %      Monocytes % 6 %      Eosinophils Relative 0 %      Basophils Relative 0 %      Absolute Neutrophils 8.04 Thousands/µL      Absolute Immature Grans 0.03 Thousand/uL      Absolute  Lymphocytes 0.92 Thousands/µL      Absolute Monocytes 0.60 Thousand/µL      Eosinophils Absolute 0.03 Thousand/µL      Basophils Absolute 0.02 Thousands/µL     Urine Microscopic [[id number]]  (Abnormal) Collected: 06/15/25 0925    Lab Status: Final result Specimen: Urine, Clean Catch Updated: 06/15/25 0958     RBC, UA Innumerable /hpf      WBC, UA Innumerable /hpf      Epithelial Cells None Seen /hpf      Bacteria, UA Occasional /hpf     Urine culture [[id number]] Collected: 06/15/25 0925    Lab Status: In process Specimen: Urine, Clean Catch Updated: 06/15/25 0958    UA w Reflex to Microscopic w Reflex to Culture [[id number]]  (Abnormal) Collected: 06/15/25 0925    Lab Status: Final result Specimen: Urine, Clean Catch Updated: 06/15/25 0945     Color, UA Yellow     Clarity, UA Turbid     Specific Gravity, UA 1.015     pH, UA 7.0     Leukocytes, UA Large     Nitrite, UA Negative     Protein,  (2+) mg/dl      Glucose, UA Negative mg/dl      Ketones, UA Negative mg/dl      Urobilinogen, UA <2.0 mg/dl      Bilirubin, UA Negative     Occult Blood, UA Large            CT renal protocol   Final Interpretation by Rachel Escamilla MD (06/15 1112)      Nonobstructing 8 mm calculus within the right renal pelvis. Minimal fullness of the calyces and ureter. See above for further details.      Mild diffuse bladder wall thickening. Cannot differentiate acute infectious inflammatory etiology versus chronic changes related to outlet obstruction.                  Workstation performed: [id number]             Procedures    ED Medication and Procedure Management   Prior to Admission Medications   Prescriptions Last Dose Informant Patient Reported? Taking?   Multiple Vitamins-Minerals (CENTRUM SILVER PO)  Self Yes No   Sig: Take by mouth   Nexlizet 180-10 MG TABS  Self Yes No   Sig: every evening   Omega-3 Fatty Acids (FISH OIL) 1200 MG CAPS  Self Yes No   Sig: Take by mouth   acetaminophen (TYLENOL) 325 mg tablet   Self No No   Sig: Take 2 tablets (650 mg total) by mouth every 4 (four) hours as needed for mild pain   amLODIPine (NORVASC) 5 mg tablet  Self Yes No   Sig: TAKE 1 TAB WHEN  OR ABOVE   Patient not taking: Reported on 10/11/2024   candesartan (ATACAND) 8 MG tablet  Self Yes No   cephalexin (KEFLEX) 500 mg capsule  Self Yes No   Sig: take 1 capsule by mouth four times a day for 7 days   diltiazem (CARDIZEM CD) 240 mg 24 hr capsule  Self Yes No   Sig: every evening   escitalopram (LEXAPRO) 20 mg tablet  Self No No   Sig: Take 1 tablet (20 mg total) by mouth daily   ketorolac (TORADOL) 10 mg tablet  Self Yes No   Sig: Take 10 mg by mouth every 6 (six) hours as needed   meclizine (ANTIVERT) 12.5 MG tablet  Self Yes No   Sig: Take 12.5-25 mg by mouth Three times daily as needed   nitroglycerin (NITROSTAT) 0.4 mg SL tablet  Self Yes No   Sig: Place 1 tablet under the tongue every 5 (five) minutes as needed   Patient not taking: Reported on 10/11/2024   ondansetron (ZOFRAN-ODT) 4 mg disintegrating tablet  Self Yes No   Sig: Take 4 mg by mouth every 8 (eight) hours as needed   phenazopyridine (PYRIDIUM) 200 mg tablet  Self Yes No   Sig: Take 200 mg by mouth 3 (three) times a day as needed for bladder spasms   tamsulosin (FLOMAX) 0.4 mg   No No   Sig: TAKE 1 CAPSULE BY MOUTH DAILY  WITH DINNER   terazosin (HYTRIN) 2 mg capsule  Self Yes No   Sig: daily at bedtime   trospium chloride (SANCTURA) 20 mg tablet   No No   Sig: TAKE 1 TABLET BY MOUTH TWICE A DAY   warfarin (COUMADIN) 5 mg tablet  Self Yes No   Sig: Take 5 mg by mouth      Facility-Administered Medications: None     Discharge Medication List as of 6/15/2025 11:47 AM        START taking these medications    Details   cefpodoxime (VANTIN) 200 mg tablet Take 1 tablet (200 mg total) by mouth 2 (two) times a day for 10 days, Starting Sun 6/15/2025, Until Wed 6/25/2025, Normal           CONTINUE these medications which have NOT CHANGED    Details   acetaminophen  (TYLENOL) 325 mg tablet Take 2 tablets (650 mg total) by mouth every 4 (four) hours as needed for mild pain, Starting Fri 2/10/2023, No Print      amLODIPine (NORVASC) 5 mg tablet TAKE 1 TAB WHEN  OR ABOVE, Historical Med      candesartan (ATACAND) 8 MG tablet Historical Med      cephalexin (KEFLEX) 500 mg capsule take 1 capsule by mouth four times a day for 7 days, Historical Med      diltiazem (CARDIZEM CD) 240 mg 24 hr capsule every evening, Starting Tue 11/14/2023, Historical Med      escitalopram (LEXAPRO) 20 mg tablet Take 1 tablet (20 mg total) by mouth daily, Starting Fri 11/1/2024, Normal      ketorolac (TORADOL) 10 mg tablet Take 10 mg by mouth every 6 (six) hours as needed, Starting Thu 7/18/2024, Historical Med      meclizine (ANTIVERT) 12.5 MG tablet Take 12.5-25 mg by mouth Three times daily as needed, Starting Sat 8/24/2024, Until Sun 8/24/2025 at 2359, Historical Med      Multiple Vitamins-Minerals (CENTRUM SILVER PO) Take by mouth, Historical Med      Nexlizet 180-10 MG TABS every evening, Starting Wed 11/9/2022, Historical Med      nitroglycerin (NITROSTAT) 0.4 mg SL tablet Place 1 tablet under the tongue every 5 (five) minutes as needed, Starting Wed 5/11/2016, Historical Med      Omega-3 Fatty Acids (FISH OIL) 1200 MG CAPS Take by mouth, Starting Thu 8/10/2017, Historical Med      ondansetron (ZOFRAN-ODT) 4 mg disintegrating tablet Take 4 mg by mouth every 8 (eight) hours as needed, Starting Thu 7/18/2024, Historical Med      phenazopyridine (PYRIDIUM) 200 mg tablet Take 200 mg by mouth 3 (three) times a day as needed for bladder spasms, Starting Wed 10/2/2024, Historical Med      tamsulosin (FLOMAX) 0.4 mg TAKE 1 CAPSULE BY MOUTH DAILY  WITH DINNER, Starting Tue 5/27/2025, Normal      terazosin (HYTRIN) 2 mg capsule daily at bedtime, Starting Tue 11/15/2022, Historical Med      trospium chloride (SANCTURA) 20 mg tablet TAKE 1 TABLET BY MOUTH TWICE A DAY, Starting Fri 2/7/2025, Normal     "  warfarin (COUMADIN) 5 mg tablet Take 5 mg by mouth, Historical Med           No discharge procedures on file.  ED SEPSIS DOCUMENTATION   Time reflects when diagnosis was documented in both MDM as applicable and the Disposition within this note       Time User Action Codes Description Comment    6/15/2025 11:44 AM Colton Milner [N30.90] Cystitis     6/15/2025 11:44 AM Colton Milner [N39.0] UTI (urinary tract infection)                    [1]   Past Medical History:  Diagnosis Date    Cancer (HCC)     Clotting disorder (HCC)     Coronary artery disease     follows with [facility] cardiology    CPAP (continuous positive airway pressure) dependence     Elevated liver enzymes     Hyperlipidemia     Hypertension     Kidney stone     Prostate cancer (HCC)     Shortness of breath     per pt not new\" doctors not sure cause\" mostly with exertion\" follows with cardio regularly\"    Sleep apnea     TIA (transient ischemic attack)     Wears dentures     upper   [2]   Past Surgical History:  Procedure Laterality Date    BACK SURGERY      CARDIAC LOOP RECORDER      and removed approx 2 years ago    CARDIAC SURGERY      CORONARY ARTERY BYPASS GRAFT      x4    OTHER SURGICAL HISTORY      Urolift    WA CYSTO INSERTION TRANSPROSTATIC IMPLANT SINGLE N/A 02/10/2023    Procedure: CYSTOSCOPY WITH INSERTION UROLIFT;  Surgeon: Paul Castro MD;  Location: [facility] MAIN OR;  Service: Urology    WA PLMT INTERSTITIAL DEV RADIAT TX PROSTATE 1/MULT N/A 01/10/2024    Procedure: INSERTION OF FIDUCIAL MARKER , SPACEAOR;  Surgeon: Dm Hoang MD;  Location:  MAIN OR;  Service: Urology    PROSTATE BIOPSY      PROSTATE SURGERY  many years ago    ROTATOR CUFF REPAIR      SPINE SURGERY      TRANSURETHRAL RESECTION OF PROSTATE      Approx. 15 years prior   [3]   Family History  Problem Relation Name Age of Onset    Heart failure Father      Heart failure Mother      Stroke Brother petra     Colon cancer Son ty     No Known Problems " Daughter      No Known Problems Daughter     [4]   Social History  Tobacco Use    Smoking status: Never    Smokeless tobacco: Never   Vaping Use    Vaping status: Never Used   Substance Use Topics    Alcohol use: Never    Drug use: Never        Colton Milner MD  06/15/25 9982

## 2025-06-15 NOTE — ED ATTENDING ATTESTATION
6/15/2025  I, Ladan Moreira MD, saw and evaluated the patient. I have discussed the patient with the resident/non-physician practitioner and agree with the resident's/non-physician practitioner's findings, Plan of Care, and MDM as documented in the resident's/non-physician practitioner's note, except where noted. All available labs and Radiology studies were reviewed.  I was present for key portions of any procedure(s) performed by the resident/non-physician practitioner and I was immediately available to provide assistance.       At this point I agree with the current assessment done in the Emergency Department.  I have conducted an independent evaluation of this patient a history and physical is as follows:    79-year-old male with remote history of prostate cancer status post TURP and UroLift, history of kidney stone nearly 1 year ago required stenting.  Since that time patient has had intermittent hematuria.  He takes warfarin daily for anticoagulation with recent dose adjustment 6 days ago.  Patient presents for evaluation of urinary frequency and urgency.  He has both symptoms at baseline, but states that over the last 24 hours he has been feeling an intense urge to urinate nearly every 10 to 15 minutes.  When he urinates only a small amount comes out.  He has chronic issues with bladder continence and has been passing blood-tinged urine into his brief.  Reports passage of small clots.  He reports intermittent hematuria since his kidney stones a year ago but blood today is more than what he is used to seeing.  Denies abdominal pain, flank pain, pain in his testicles, fevers, or chills.  No nausea, vomiting, or change in his stool.    Physical Exam  Vitals and nursing note reviewed.   Constitutional:       General: He is not in acute distress.     Appearance: He is well-developed. He is not ill-appearing, toxic-appearing or diaphoretic.   HENT:      Head: Normocephalic and atraumatic.      Right Ear:  External ear normal.      Left Ear: External ear normal.      Nose: Nose normal.     Eyes:      Conjunctiva/sclera: Conjunctivae normal.       Cardiovascular:      Rate and Rhythm: Normal rate and regular rhythm.      Pulses: Normal pulses.      Heart sounds: Normal heart sounds. No murmur heard.     No friction rub. No gallop.   Pulmonary:      Effort: Pulmonary effort is normal. No respiratory distress.      Breath sounds: Normal breath sounds. No wheezing or rales.   Abdominal:      General: Bowel sounds are normal. There is no distension.      Palpations: Abdomen is soft.      Tenderness: There is no abdominal tenderness. There is no right CVA tenderness, left CVA tenderness or guarding.     Musculoskeletal:         General: No deformity. Normal range of motion.      Cervical back: Normal range of motion and neck supple.      Right lower leg: No edema.      Left lower leg: No edema.     Skin:     General: Skin is warm and dry.     Neurological:      Mental Status: He is alert and oriented to person, place, and time.      Motor: No abnormal muscle tone.     Psychiatric:         Mood and Affect: Mood normal.           ED Course  ED Course as of 06/15/25 1352   Sun Randall 15, 2025   1009 PVR 18 mL.    1019 UA with occasional bacteria, innumerable red blood cells and white blood cells, large leukocytes.  In the setting of increased urinary urgency with frequency, will plan to treat patient for cystitis.  CBC with no leukocytosis but left shift noted.   1145 CT abdomen pelvis with an 8 mm nonobstructing calculus within the right renal pelvis with minimal fullness of the calyces and ureter that is nonspecific, may indicate inflammation or recently passed stone.  No other renal or ureteral calculi noted.  Patient has diffuse bladder wall thickening.  In the setting of his worsening urinary urgency with frequency concern is high for cystitis.  Will treat for complicated UTI with course of cefpodoxime.  INR is within  therapeutic range.  While patient is passing small blood clots but he is emptying his bladder and postvoid residual is 18 mL.  Return precautions discussed for difficulty passing urine, fevers, chills, flank pain, or for new or concerning symptoms.         Critical Care Time  Procedures

## 2025-06-15 NOTE — DISCHARGE INSTRUCTIONS
Dear Dm Hyatt Jr.,     It was our pleasure to care for you here at [facility]. It was an honor and privilege to be part of your health care team. Please review this entire after visit summary and read your personalized discharge instructions below:  Please follow up with your PCP within 1 week or as soon as possible.  Please take cefpodoxime, an antibiotic to treat urinary tract infection. Please take 200mg every 12 hours for 10 days..  Please return to the ER if your symptoms worsen or fail to improve, if you experience inability to urinate, abdominal pain, fever, or if you experience anything concerning to you.    Sincerely,     Colton Milner MD

## 2025-06-16 ENCOUNTER — RESULTS FOLLOW-UP (OUTPATIENT)
Dept: EMERGENCY DEPT | Facility: HOSPITAL | Age: 80
End: 2025-06-16

## 2025-06-17 LAB — BACTERIA UR CULT: ABNORMAL

## 2025-07-01 ENCOUNTER — HOSPITAL ENCOUNTER (EMERGENCY)
Facility: HOSPITAL | Age: 80
Discharge: HOME | End: 2025-07-01
Attending: EMERGENCY MEDICINE
Payer: MEDICARE

## 2025-07-01 VITALS
RESPIRATION RATE: 16 BRPM | HEART RATE: 84 BPM | TEMPERATURE: 98.3 F | OXYGEN SATURATION: 94 % | SYSTOLIC BLOOD PRESSURE: 145 MMHG | DIASTOLIC BLOOD PRESSURE: 68 MMHG

## 2025-07-01 DIAGNOSIS — N30.01 ACUTE CYSTITIS WITH HEMATURIA: Primary | ICD-10-CM

## 2025-07-01 DIAGNOSIS — N20.0 NEPHROLITHIASIS: ICD-10-CM

## 2025-07-01 LAB
BACTERIA UR QL AUTO: ABNORMAL /HPF
BILIRUB UR QL STRIP: NEGATIVE
CLARITY UR: ABNORMAL
COLOR UR: ABNORMAL
GLUCOSE UR STRIP-MCNC: NEGATIVE MG/DL
HGB UR QL STRIP.AUTO: ABNORMAL
KETONES UR STRIP-MCNC: NEGATIVE MG/DL
LEUKOCYTE ESTERASE UR QL STRIP: ABNORMAL
NITRITE UR QL STRIP: NEGATIVE
NON-SQ EPI CELLS URNS QL MICRO: ABNORMAL /HPF
PH UR STRIP.AUTO: 7 [PH]
PROT UR STRIP-MCNC: ABNORMAL MG/DL
RBC #/AREA URNS AUTO: ABNORMAL /HPF
SP GR UR STRIP.AUTO: 1.02 (ref 1–1.03)
UROBILINOGEN UR STRIP-ACNC: <2 MG/DL
WBC #/AREA URNS AUTO: ABNORMAL /HPF

## 2025-07-01 PROCEDURE — 81001 URINALYSIS AUTO W/SCOPE: CPT | Performed by: EMERGENCY MEDICINE

## 2025-07-01 PROCEDURE — 87086 URINE CULTURE/COLONY COUNT: CPT | Performed by: EMERGENCY MEDICINE

## 2025-07-01 PROCEDURE — 87077 CULTURE AEROBIC IDENTIFY: CPT | Performed by: EMERGENCY MEDICINE

## 2025-07-01 PROCEDURE — 99283 EMERGENCY DEPT VISIT LOW MDM: CPT

## 2025-07-01 PROCEDURE — 99284 EMERGENCY DEPT VISIT MOD MDM: CPT | Performed by: EMERGENCY MEDICINE

## 2025-07-01 PROCEDURE — 87186 SC STD MICRODIL/AGAR DIL: CPT | Performed by: EMERGENCY MEDICINE

## 2025-07-01 RX ORDER — CEPHALEXIN 500 MG/1
500 CAPSULE ORAL 3 TIMES DAILY
Qty: 30 CAPSULE | Refills: 0 | Status: SHIPPED | OUTPATIENT
Start: 2025-07-01 | End: 2025-07-01

## 2025-07-01 RX ORDER — CEPHALEXIN 500 MG/1
500 CAPSULE ORAL 3 TIMES DAILY
Qty: 30 CAPSULE | Refills: 1 | Status: SHIPPED | OUTPATIENT
Start: 2025-07-01 | End: 2025-07-21

## 2025-07-01 RX ADMIN — CEPHALEXIN 500 MG: 250 CAPSULE ORAL at 16:16

## 2025-07-01 NOTE — ED PROVIDER NOTES
Time reflects when diagnosis was documented in both MDM as applicable and the Disposition within this note       Time User Action Codes Description Comment    7/1/2025  4:08 PM Kofi Wylie Add [N30.01] Acute cystitis with hematuria     7/1/2025  4:08 PM Kofi Wylie Add [N20.0] Nephrolithiasis     7/1/2025  4:08 PM Kofi Wylie Modify [N20.0] Nephrolithiasis Nonobstructing intrarenal          ED Disposition       ED Disposition   Discharge    Condition   Stable    Date/Time   Tue Jul 1, 2025  4:08 PM    Comment   Dm Hyatt Jr. discharge to home/self care.                   Assessment & Plan       Medical Decision Making        Initial ED assessment:     79-year-old male, presents with dysuria, dribbling of urine, recent UTI finished 10-day course of Vantin but symptoms immediately returned.      Pathology at risk for includes but is not limited to:    Incomplete bladder emptying causing recurrent UTI, known intrarenal stone potentially could be infected intrarenal stone which is receding urine and causing UTI.      Initial ED plan:    Urine analysis, likely repeat antibiotics.  Culture reviewed pan susceptible E. coli        Final ED summary/disposition:   After evaluation and workup in the emergency department, discharged on Keflex.  Is an appointment with his urologist in a week and a half.,  Given a 10-day supply of Keflex.,  I did place a refill on it if symptoms return he can restart it again until he gets in with his urologist.  He understands and agrees to the discharge plan.    Amount and/or Complexity of Data Reviewed  Labs: ordered.    Risk  Prescription drug management.             Medications   cephalexin (KEFLEX) capsule 500 mg (has no administration in time range)       ED Risk Strat Scores                    No data recorded                            History of Present Illness       Chief Complaint   Patient presents with    Possible UTI     Pt had UTI June 14 th seemed to get  better but started all over again.       Past Medical History[1]   Past Surgical History[2]   Family History[3]   Social History[4]   E-Cigarette/Vaping    E-Cigarette Use Never User       E-Cigarette/Vaping Substances    Nicotine No     THC No     CBD No     Flavoring No     Other No     Unknown No       I have reviewed and agree with the history as documented.     79-year-old male, presents with dysuria.,  Recently seen here for UTI finished a 10-day course of Vantin.,  Patient states he did finish all of the pills and did not miss any.  No longer had dysuria after being on the antibiotics for 2 days and was urinating normally, 3 days after finishing the antibiotics all of his symptoms completely returned.  No fevers or chills.,  Eating well drinking well.  Reviewed recent ED workup, had a CT scan which showed a renal nephrolithiasis without any evidence of obstruction.  Reviewed urine culture which was E. coli and pansensitive.      History provided by:  Patient and medical records      Review of Systems   Constitutional:  Negative for activity change, chills, diaphoresis and fever.   HENT:  Negative for congestion, sinus pressure and sore throat.    Eyes:  Negative for pain and visual disturbance.   Respiratory:  Negative for cough, chest tightness, shortness of breath, wheezing and stridor.    Cardiovascular:  Negative for chest pain and palpitations.   Gastrointestinal:  Negative for abdominal distention, abdominal pain, constipation, diarrhea, nausea and vomiting.   Genitourinary:  Positive for decreased urine volume, dysuria, frequency and urgency. Negative for penile discharge, penile pain, penile swelling, scrotal swelling and testicular pain.   Musculoskeletal:  Negative for neck pain and neck stiffness.   Skin:  Negative for rash.   Neurological:  Negative for dizziness, speech difficulty, light-headedness, numbness and headaches.           Objective       ED Triage Vitals [07/01/25 1515]   Temperature  Pulse Blood Pressure Respirations SpO2 Patient Position - Orthostatic VS   98.3 °F (36.8 °C) 84 145/68 16 94 % --      Temp Source Heart Rate Source BP Location FiO2 (%) Pain Score    Oral -- Left arm -- No Pain      Vitals      Date and Time Temp Pulse SpO2 Resp BP Pain Score FACES Pain Rating User   07/01/25 1515 98.3 °F (36.8 °C) 84 94 % 16 145/68 No Pain -- RLN            Physical Exam  Vitals reviewed.   Constitutional:       General: He is not in acute distress.     Appearance: He is well-developed. He is not diaphoretic.   HENT:      Head: Normocephalic and atraumatic.      Right Ear: External ear normal.      Left Ear: External ear normal.      Nose: Nose normal.     Eyes:      General:         Right eye: No discharge.         Left eye: No discharge.      Pupils: Pupils are equal, round, and reactive to light.     Neck:      Trachea: No tracheal deviation.     Cardiovascular:      Rate and Rhythm: Normal rate and regular rhythm.      Heart sounds: Normal heart sounds. No murmur heard.  Pulmonary:      Effort: Pulmonary effort is normal. No respiratory distress.      Breath sounds: Normal breath sounds. No stridor.   Abdominal:      General: There is no distension.      Palpations: Abdomen is soft.      Tenderness: There is no abdominal tenderness. There is no right CVA tenderness, left CVA tenderness, guarding or rebound.     Musculoskeletal:         General: Normal range of motion.      Cervical back: Normal range of motion and neck supple.     Skin:     General: Skin is warm and dry.      Coloration: Skin is not pale.      Findings: No erythema.     Neurological:      General: No focal deficit present.      Mental Status: He is alert and oriented to person, place, and time.         Results Reviewed       Procedure Component Value Units Date/Time    Urine Microscopic [[id number]]  (Abnormal) Collected: 07/01/25 1545    Lab Status: Final result Specimen: Urine, Clean Catch Updated: 07/01/25 1556     RBC, UA  Innumerable /hpf      WBC, UA Innumerable /hpf      Epithelial Cells None Seen /hpf      Bacteria, UA None Seen /hpf     Urine culture [[id number]] Collected: 07/01/25 1543    Lab Status: In process Specimen: Urine, Clean Catch Updated: 07/01/25 1559    UA w Reflex to Microscopic w Reflex to Culture [[id number]]  (Abnormal) Collected: 07/01/25 1543    Lab Status: Final result Specimen: Urine, Clean Catch Updated: 07/01/25 1554     Color, UA Light Orange     Clarity, UA Turbid     Specific Gravity, UA 1.017     pH, UA 7.0     Leukocytes, UA Large     Nitrite, UA Negative     Protein, UA 70 (1+) mg/dl      Glucose, UA Negative mg/dl      Ketones, UA Negative mg/dl      Urobilinogen, UA <2.0 mg/dl      Bilirubin, UA Negative     Occult Blood, UA Large            No orders to display       Procedures    ED Medication and Procedure Management   Prior to Admission Medications   Prescriptions Last Dose Informant Patient Reported? Taking?   Multiple Vitamins-Minerals (CENTRUM SILVER PO)  Self Yes No   Sig: Take by mouth   Nexlizet 180-10 MG TABS  Self Yes No   Sig: every evening   Omega-3 Fatty Acids (FISH OIL) 1200 MG CAPS  Self Yes No   Sig: Take by mouth   acetaminophen (TYLENOL) 325 mg tablet  Self No No   Sig: Take 2 tablets (650 mg total) by mouth every 4 (four) hours as needed for mild pain   amLODIPine (NORVASC) 5 mg tablet  Self Yes No   Sig: TAKE 1 TAB WHEN  OR ABOVE   Patient not taking: Reported on 10/11/2024   candesartan (ATACAND) 8 MG tablet  Self Yes No   cephalexin (KEFLEX) 500 mg capsule  Self Yes No   Sig: take 1 capsule by mouth four times a day for 7 days   diltiazem (CARDIZEM CD) 240 mg 24 hr capsule  Self Yes No   Sig: every evening   escitalopram (LEXAPRO) 20 mg tablet  Self No No   Sig: Take 1 tablet (20 mg total) by mouth daily   ketorolac (TORADOL) 10 mg tablet  Self Yes No   Sig: Take 10 mg by mouth every 6 (six) hours as needed   meclizine (ANTIVERT) 12.5 MG tablet  Self Yes No   Sig: Take  12.5-25 mg by mouth Three times daily as needed   nitroglycerin (NITROSTAT) 0.4 mg SL tablet  Self Yes No   Sig: Place 1 tablet under the tongue every 5 (five) minutes as needed   Patient not taking: Reported on 10/11/2024   ondansetron (ZOFRAN-ODT) 4 mg disintegrating tablet  Self Yes No   Sig: Take 4 mg by mouth every 8 (eight) hours as needed   phenazopyridine (PYRIDIUM) 200 mg tablet  Self Yes No   Sig: Take 200 mg by mouth 3 (three) times a day as needed for bladder spasms   tamsulosin (FLOMAX) 0.4 mg   No No   Sig: TAKE 1 CAPSULE BY MOUTH DAILY  WITH DINNER   terazosin (HYTRIN) 2 mg capsule  Self Yes No   Sig: daily at bedtime   trospium chloride (SANCTURA) 20 mg tablet   No No   Sig: TAKE 1 TABLET BY MOUTH TWICE A DAY   warfarin (COUMADIN) 5 mg tablet  Self Yes No   Sig: Take 5 mg by mouth      Facility-Administered Medications: None     Current Discharge Medication List        START taking these medications    Details   !! cephalexin (KEFLEX) 500 mg capsule Take 1 capsule (500 mg total) by mouth 3 (three) times a day for 20 days  Qty: 30 capsule, Refills: 1    Associated Diagnoses: Acute cystitis with hematuria       !! - Potential duplicate medications found. Please discuss with provider.        CONTINUE these medications which have NOT CHANGED    Details   acetaminophen (TYLENOL) 325 mg tablet Take 2 tablets (650 mg total) by mouth every 4 (four) hours as needed for mild pain  Qty: 30 tablet, Refills: 0    Associated Diagnoses: Benign prostatic hyperplasia with nocturia      amLODIPine (NORVASC) 5 mg tablet TAKE 1 TAB WHEN  OR ABOVE      candesartan (ATACAND) 8 MG tablet       !! cephalexin (KEFLEX) 500 mg capsule take 1 capsule by mouth four times a day for 7 days      diltiazem (CARDIZEM CD) 240 mg 24 hr capsule every evening      escitalopram (LEXAPRO) 20 mg tablet Take 1 tablet (20 mg total) by mouth daily  Qty: 90 tablet, Refills: 2    Comments: Please send a replace/new response with 90-Day  Supply if appropriate to maximize member benefit. Requesting 1 year supply.  Associated Diagnoses: Anxiety      ketorolac (TORADOL) 10 mg tablet Take 10 mg by mouth every 6 (six) hours as needed      meclizine (ANTIVERT) 12.5 MG tablet Take 12.5-25 mg by mouth Three times daily as needed      Multiple Vitamins-Minerals (CENTRUM SILVER PO) Take by mouth      Nexlizet 180-10 MG TABS every evening      nitroglycerin (NITROSTAT) 0.4 mg SL tablet Place 1 tablet under the tongue every 5 (five) minutes as needed      Omega-3 Fatty Acids (FISH OIL) 1200 MG CAPS Take by mouth      ondansetron (ZOFRAN-ODT) 4 mg disintegrating tablet Take 4 mg by mouth every 8 (eight) hours as needed      phenazopyridine (PYRIDIUM) 200 mg tablet Take 200 mg by mouth 3 (three) times a day as needed for bladder spasms      tamsulosin (FLOMAX) 0.4 mg TAKE 1 CAPSULE BY MOUTH DAILY  WITH DINNER  Qty: 90 capsule, Refills: 1    Comments: Please send a replace/new response with 90-Day Supply if appropriate to maximize member benefit. Requesting 1 year supply.  Associated Diagnoses: BPH with obstruction/lower urinary tract symptoms      terazosin (HYTRIN) 2 mg capsule daily at bedtime      trospium chloride (SANCTURA) 20 mg tablet TAKE 1 TABLET BY MOUTH TWICE A DAY  Qty: 180 tablet, Refills: 2    Associated Diagnoses: OAB (overactive bladder)      warfarin (COUMADIN) 5 mg tablet Take 5 mg by mouth       !! - Potential duplicate medications found. Please discuss with provider.        No discharge procedures on file.  ED SEPSIS DOCUMENTATION   Time reflects when diagnosis was documented in both MDM as applicable and the Disposition within this note       Time User Action Codes Description Comment    7/1/2025  4:08 PM Kofi Wylie Add [N30.01] Acute cystitis with hematuria     7/1/2025  4:08 PM Kofi Wylie Add [N20.0] Nephrolithiasis     7/1/2025  4:08 PM Kofi Wylie Modify [N20.0] Nephrolithiasis Nonobstructing intrarenal                "    [1]   Past Medical History:  Diagnosis Date    Cancer (HCC)     Clotting disorder (HCC)     Coronary artery disease     follows with [facility] cardiology    CPAP (continuous positive airway pressure) dependence     Elevated liver enzymes     Hyperlipidemia     Hypertension     Kidney stone     Prostate cancer (HCC)     Shortness of breath     per pt not new\" doctors not sure cause\" mostly with exertion\" follows with cardio regularly\"    Sleep apnea     TIA (transient ischemic attack)     Wears dentures     upper   [2]   Past Surgical History:  Procedure Laterality Date    BACK SURGERY      CARDIAC LOOP RECORDER      and removed approx 2 years ago    CARDIAC SURGERY      CORONARY ARTERY BYPASS GRAFT      x4    OTHER SURGICAL HISTORY      Urolift    PA CYSTO INSERTION TRANSPROSTATIC IMPLANT SINGLE N/A 02/10/2023    Procedure: CYSTOSCOPY WITH INSERTION UROLIFT;  Surgeon: Paul Castro MD;  Location: [facility] MAIN OR;  Service: Urology    PA PLMT INTERSTITIAL DEV RADIAT TX PROSTATE 1/MULT N/A 01/10/2024    Procedure: INSERTION OF FIDUCIAL MARKER , SPACEAOR;  Surgeon: Dm Hoang MD;  Location:  MAIN OR;  Service: Urology    PROSTATE BIOPSY      PROSTATE SURGERY  many years ago    ROTATOR CUFF REPAIR      SPINE SURGERY      TRANSURETHRAL RESECTION OF PROSTATE      Approx. 15 years prior   [3]   Family History  Problem Relation Name Age of Onset    Heart failure Father      Heart failure Mother      Stroke Brother petra     Colon cancer Son ty     No Known Problems Daughter      No Known Problems Daughter     [4]   Social History  Tobacco Use    Smoking status: Never    Smokeless tobacco: Never   Vaping Use    Vaping status: Never Used   Substance Use Topics    Alcohol use: Never    Drug use: Never        Kofi Wylie DO  07/01/25 1616    "

## 2025-07-03 LAB — BACTERIA UR CULT: ABNORMAL

## 2025-07-13 DIAGNOSIS — F41.9 ANXIETY: ICD-10-CM

## 2025-07-14 RX ORDER — ESCITALOPRAM OXALATE 20 MG/1
20 TABLET ORAL DAILY
Qty: 90 TABLET | Refills: 0 | Status: SHIPPED | OUTPATIENT
Start: 2025-07-14

## 2025-07-15 ENCOUNTER — OFFICE VISIT (OUTPATIENT)
Dept: UROLOGY | Facility: CLINIC | Age: 80
End: 2025-07-15
Payer: MEDICARE

## 2025-07-15 VITALS
OXYGEN SATURATION: 95 % | SYSTOLIC BLOOD PRESSURE: 142 MMHG | DIASTOLIC BLOOD PRESSURE: 72 MMHG | HEART RATE: 75 BPM | HEIGHT: 71 IN | WEIGHT: 208 LBS | BODY MASS INDEX: 29.12 KG/M2

## 2025-07-15 DIAGNOSIS — N20.0 NEPHROLITHIASIS: ICD-10-CM

## 2025-07-15 DIAGNOSIS — C61 PROSTATE CANCER (HCC): Primary | ICD-10-CM

## 2025-07-15 PROCEDURE — 99213 OFFICE O/P EST LOW 20 MIN: CPT | Performed by: PHYSICIAN ASSISTANT

## 2025-07-30 ENCOUNTER — NURSE TRIAGE (OUTPATIENT)
Age: 80
End: 2025-07-30

## 2025-07-30 DIAGNOSIS — R39.9 UTI SYMPTOMS: Primary | ICD-10-CM

## 2025-07-30 RX ORDER — CEFDINIR 300 MG/1
300 CAPSULE ORAL EVERY 12 HOURS SCHEDULED
Qty: 14 CAPSULE | Refills: 0 | Status: SHIPPED | OUTPATIENT
Start: 2025-07-30 | End: 2025-08-06

## 2025-08-01 LAB
CAOX CRY #/AREA URNS HPF: PRESENT /[HPF]
GLUCOSE UR QL STRIP: NEGATIVE MG/DL
HGB UR QL STRIP: ABNORMAL MG/DL
KETONES UR QL STRIP: NEGATIVE MG/DL
LEGIONELLA SPEC CULT: NORMAL
LEUKOCYTE ESTERASE UR QL STRIP: 75 /UL
MUCOUS THREADS URNS QL MICRO: ABNORMAL
NITRITE UR QL STRIP: NEGATIVE
PH UR: 6 [PH] (ref 4.5–8)
PROT 24H UR-MRATE: 30 MG/DL
RBC #/AREA URNS HPF: 21 /HPF (ref 0–2)
SL AMB POCT URINE COMMENT: ABNORMAL
SP GR UR: 1.02 (ref 1–1.03)
SQUAMOUS #/AREA URNS HPF: 3 /LPF (ref 0–5)
WBC #/AREA URNS HPF: 51 /HPF (ref 0–5)

## 2025-08-12 ENCOUNTER — HOSPITAL ENCOUNTER (INPATIENT)
Facility: HOSPITAL | Age: 80
LOS: 4 days | Discharge: HOME | DRG: 660 | End: 2025-08-17
Attending: EMERGENCY MEDICINE | Admitting: INTERNAL MEDICINE
Payer: MEDICARE

## 2025-08-12 ENCOUNTER — APPOINTMENT (EMERGENCY)
Dept: CT IMAGING | Facility: HOSPITAL | Age: 80
DRG: 660 | End: 2025-08-12
Payer: MEDICARE

## 2025-08-12 DIAGNOSIS — R33.9 URINARY RETENTION: ICD-10-CM

## 2025-08-12 DIAGNOSIS — I48.0 PAROXYSMAL ATRIAL FIBRILLATION (HCC): ICD-10-CM

## 2025-08-12 DIAGNOSIS — R31.0 GROSS HEMATURIA: Primary | ICD-10-CM

## 2025-08-12 DIAGNOSIS — N20.1 URETERAL CALCULUS, RIGHT: ICD-10-CM

## 2025-08-12 PROBLEM — I48.91 ATRIAL FIBRILLATION (HCC): Status: ACTIVE | Noted: 2025-08-12

## 2025-08-12 LAB
ALBUMIN SERPL BCG-MCNC: 3.7 G/DL (ref 3.5–5)
ALP SERPL-CCNC: 66 U/L (ref 34–104)
ALT SERPL W P-5'-P-CCNC: 20 U/L (ref 7–52)
ANION GAP SERPL CALCULATED.3IONS-SCNC: 7 MMOL/L (ref 4–13)
APTT PPP: 40 SECONDS (ref 23–34)
AST SERPL W P-5'-P-CCNC: 26 U/L (ref 13–39)
BACTERIA UR QL AUTO: NORMAL /HPF
BASOPHILS # BLD AUTO: 0.02 THOUSANDS/ÂΜL (ref 0–0.1)
BASOPHILS NFR BLD AUTO: 0 % (ref 0–1)
BILIRUB SERPL-MCNC: 0.67 MG/DL (ref 0.2–1)
BILIRUB UR QL STRIP: NEGATIVE
BUN SERPL-MCNC: 23 MG/DL (ref 5–25)
CALCIUM SERPL-MCNC: 9.4 MG/DL (ref 8.4–10.2)
CHLORIDE SERPL-SCNC: 107 MMOL/L (ref 96–108)
CLARITY UR: ABNORMAL
CO2 SERPL-SCNC: 24 MMOL/L (ref 21–32)
COLOR UR: ABNORMAL
CREAT SERPL-MCNC: 1.46 MG/DL (ref 0.6–1.3)
EOSINOPHIL # BLD AUTO: 0.04 THOUSAND/ÂΜL (ref 0–0.61)
EOSINOPHIL NFR BLD AUTO: 0 % (ref 0–6)
ERYTHROCYTE [DISTWIDTH] IN BLOOD BY AUTOMATED COUNT: 12.6 % (ref 11.6–15.1)
GFR SERPL CREATININE-BSD FRML MDRD: 44 ML/MIN/1.73SQ M
GLUCOSE SERPL-MCNC: 133 MG/DL (ref 65–140)
GLUCOSE UR STRIP-MCNC: NEGATIVE MG/DL
HCT VFR BLD AUTO: 40.4 % (ref 36.5–49.3)
HGB BLD-MCNC: 13.8 G/DL (ref 12–17)
HGB UR QL STRIP.AUTO: ABNORMAL
IMM GRANULOCYTES # BLD AUTO: 0.06 THOUSAND/UL (ref 0–0.2)
IMM GRANULOCYTES NFR BLD AUTO: 1 % (ref 0–2)
INR PPP: 2.22 (ref 0.85–1.19)
KETONES UR STRIP-MCNC: NEGATIVE MG/DL
LEUKOCYTE ESTERASE UR QL STRIP: ABNORMAL
LYMPHOCYTES # BLD AUTO: 0.86 THOUSANDS/ÂΜL (ref 0.6–4.47)
LYMPHOCYTES NFR BLD AUTO: 7 % (ref 14–44)
MCH RBC QN AUTO: 33 PG (ref 26.8–34.3)
MCHC RBC AUTO-ENTMCNC: 34.2 G/DL (ref 31.4–37.4)
MCV RBC AUTO: 97 FL (ref 82–98)
MONOCYTES # BLD AUTO: 0.72 THOUSAND/ÂΜL (ref 0.17–1.22)
MONOCYTES NFR BLD AUTO: 5 % (ref 4–12)
NEUTROPHILS # BLD AUTO: 11.55 THOUSANDS/ÂΜL (ref 1.85–7.62)
NEUTS SEG NFR BLD AUTO: 87 % (ref 43–75)
NITRITE UR QL STRIP: NEGATIVE
NON-SQ EPI CELLS URNS QL MICRO: NORMAL /HPF
NRBC BLD AUTO-RTO: 0 /100 WBCS
PH UR STRIP.AUTO: 7 [PH]
PLATELET # BLD AUTO: 249 THOUSANDS/UL (ref 149–390)
PMV BLD AUTO: 9.4 FL (ref 8.9–12.7)
POTASSIUM SERPL-SCNC: 4.3 MMOL/L (ref 3.5–5.3)
PROT SERPL-MCNC: 6.9 G/DL (ref 6.4–8.4)
PROT UR STRIP-MCNC: ABNORMAL MG/DL
PROTHROMBIN TIME: 25.3 SECONDS (ref 12.3–15)
RBC # BLD AUTO: 4.18 MILLION/UL (ref 3.88–5.62)
RBC #/AREA URNS AUTO: NORMAL /HPF
SODIUM SERPL-SCNC: 138 MMOL/L (ref 135–147)
SP GR UR STRIP.AUTO: 1 (ref 1–1.03)
UROBILINOGEN UR STRIP-ACNC: <2 MG/DL
WBC # BLD AUTO: 13.25 THOUSAND/UL (ref 4.31–10.16)
WBC #/AREA URNS AUTO: NORMAL /HPF

## 2025-08-12 PROCEDURE — 51702 INSERT TEMP BLADDER CATH: CPT | Performed by: UROLOGY

## 2025-08-12 PROCEDURE — NC001 PR NO CHARGE: Performed by: UROLOGY

## 2025-08-12 PROCEDURE — 99284 EMERGENCY DEPT VISIT MOD MDM: CPT

## 2025-08-12 PROCEDURE — 80053 COMPREHEN METABOLIC PANEL: CPT | Performed by: EMERGENCY MEDICINE

## 2025-08-12 PROCEDURE — 36415 COLL VENOUS BLD VENIPUNCTURE: CPT | Performed by: EMERGENCY MEDICINE

## 2025-08-12 PROCEDURE — 87077 CULTURE AEROBIC IDENTIFY: CPT | Performed by: INTERNAL MEDICINE

## 2025-08-12 PROCEDURE — 0T9B70Z DRAINAGE OF BLADDER WITH DRAINAGE DEVICE, VIA NATURAL OR ARTIFICIAL OPENING: ICD-10-PCS | Performed by: UROLOGY

## 2025-08-12 PROCEDURE — 74177 CT ABD & PELVIS W/CONTRAST: CPT

## 2025-08-12 PROCEDURE — 96374 THER/PROPH/DIAG INJ IV PUSH: CPT

## 2025-08-12 PROCEDURE — 96375 TX/PRO/DX INJ NEW DRUG ADDON: CPT

## 2025-08-12 PROCEDURE — 81001 URINALYSIS AUTO W/SCOPE: CPT | Performed by: EMERGENCY MEDICINE

## 2025-08-12 PROCEDURE — 99285 EMERGENCY DEPT VISIT HI MDM: CPT | Performed by: EMERGENCY MEDICINE

## 2025-08-12 PROCEDURE — 85730 THROMBOPLASTIN TIME PARTIAL: CPT

## 2025-08-12 PROCEDURE — 87086 URINE CULTURE/COLONY COUNT: CPT | Performed by: INTERNAL MEDICINE

## 2025-08-12 PROCEDURE — 87186 SC STD MICRODIL/AGAR DIL: CPT | Performed by: INTERNAL MEDICINE

## 2025-08-12 PROCEDURE — 3E1K38Z IRRIGATION OF GENITOURINARY TRACT USING IRRIGATING SUBSTANCE, PERCUTANEOUS APPROACH: ICD-10-PCS | Performed by: INTERNAL MEDICINE

## 2025-08-12 PROCEDURE — 99223 1ST HOSP IP/OBS HIGH 75: CPT | Performed by: INTERNAL MEDICINE

## 2025-08-12 PROCEDURE — 85610 PROTHROMBIN TIME: CPT

## 2025-08-12 PROCEDURE — 96361 HYDRATE IV INFUSION ADD-ON: CPT

## 2025-08-12 PROCEDURE — 85025 COMPLETE CBC W/AUTO DIFF WBC: CPT | Performed by: EMERGENCY MEDICINE

## 2025-08-12 PROCEDURE — 99223 1ST HOSP IP/OBS HIGH 75: CPT | Performed by: UROLOGY

## 2025-08-12 RX ORDER — LOSARTAN POTASSIUM 50 MG/1
50 TABLET ORAL DAILY
Status: DISCONTINUED | OUTPATIENT
Start: 2025-08-13 | End: 2025-08-17 | Stop reason: HOSPADM

## 2025-08-12 RX ORDER — SODIUM CHLORIDE, SODIUM LACTATE, POTASSIUM CHLORIDE, CALCIUM CHLORIDE 600; 310; 30; 20 MG/100ML; MG/100ML; MG/100ML; MG/100ML
100 INJECTION, SOLUTION INTRAVENOUS CONTINUOUS
Status: DISCONTINUED | OUTPATIENT
Start: 2025-08-12 | End: 2025-08-15

## 2025-08-12 RX ORDER — MECLIZINE HCL 12.5 MG 12.5 MG/1
12.5 TABLET ORAL DAILY
Status: DISCONTINUED | OUTPATIENT
Start: 2025-08-12 | End: 2025-08-17 | Stop reason: HOSPADM

## 2025-08-12 RX ORDER — HYDROMORPHONE HCL IN WATER/PF 6 MG/30 ML
0.2 PATIENT CONTROLLED ANALGESIA SYRINGE INTRAVENOUS EVERY 4 HOURS PRN
Status: DISCONTINUED | OUTPATIENT
Start: 2025-08-12 | End: 2025-08-15

## 2025-08-12 RX ORDER — OXYBUTYNIN CHLORIDE 5 MG/1
5 TABLET ORAL 2 TIMES DAILY
Status: DISCONTINUED | OUTPATIENT
Start: 2025-08-12 | End: 2025-08-17 | Stop reason: HOSPADM

## 2025-08-12 RX ORDER — OXYCODONE HYDROCHLORIDE 5 MG/1
5 TABLET ORAL EVERY 4 HOURS PRN
Refills: 0 | Status: DISCONTINUED | OUTPATIENT
Start: 2025-08-12 | End: 2025-08-15

## 2025-08-12 RX ORDER — PHYTONADIONE 5 MG/1
5 TABLET ORAL ONCE
Status: COMPLETED | OUTPATIENT
Start: 2025-08-12 | End: 2025-08-12

## 2025-08-12 RX ORDER — CHLORAL HYDRATE 500 MG
1000 CAPSULE ORAL DAILY
Status: DISCONTINUED | OUTPATIENT
Start: 2025-08-13 | End: 2025-08-17 | Stop reason: HOSPADM

## 2025-08-12 RX ORDER — TAMSULOSIN HYDROCHLORIDE 0.4 MG/1
0.4 CAPSULE ORAL
Status: DISCONTINUED | OUTPATIENT
Start: 2025-08-12 | End: 2025-08-17 | Stop reason: HOSPADM

## 2025-08-12 RX ORDER — ONDANSETRON 2 MG/ML
4 INJECTION INTRAMUSCULAR; INTRAVENOUS ONCE
Status: COMPLETED | OUTPATIENT
Start: 2025-08-12 | End: 2025-08-12

## 2025-08-12 RX ORDER — ONDANSETRON 2 MG/ML
4 INJECTION INTRAMUSCULAR; INTRAVENOUS EVERY 6 HOURS PRN
Status: DISCONTINUED | OUTPATIENT
Start: 2025-08-12 | End: 2025-08-17 | Stop reason: HOSPADM

## 2025-08-12 RX ORDER — ESCITALOPRAM OXALATE 20 MG/1
20 TABLET ORAL DAILY
Status: DISCONTINUED | OUTPATIENT
Start: 2025-08-13 | End: 2025-08-17 | Stop reason: HOSPADM

## 2025-08-12 RX ORDER — LIDOCAINE HYDROCHLORIDE 20 MG/ML
1 JELLY TOPICAL ONCE
Status: COMPLETED | OUTPATIENT
Start: 2025-08-12 | End: 2025-08-12

## 2025-08-12 RX ORDER — LIDOCAINE HYDROCHLORIDE 20 MG/ML
1 JELLY TOPICAL ONCE
Status: DISCONTINUED | OUTPATIENT
Start: 2025-08-13 | End: 2025-08-17 | Stop reason: HOSPADM

## 2025-08-12 RX ORDER — TERAZOSIN 1 MG/1
2 CAPSULE ORAL
Status: DISCONTINUED | OUTPATIENT
Start: 2025-08-12 | End: 2025-08-13

## 2025-08-12 RX ORDER — DILTIAZEM HYDROCHLORIDE 240 MG/1
240 CAPSULE, COATED, EXTENDED RELEASE ORAL EVERY EVENING
Status: DISCONTINUED | OUTPATIENT
Start: 2025-08-12 | End: 2025-08-17 | Stop reason: HOSPADM

## 2025-08-12 RX ORDER — NITROGLYCERIN 0.4 MG/1
0.4 TABLET SUBLINGUAL
Status: DISCONTINUED | OUTPATIENT
Start: 2025-08-12 | End: 2025-08-17 | Stop reason: HOSPADM

## 2025-08-12 RX ORDER — HYDROMORPHONE HCL IN WATER/PF 6 MG/30 ML
0.2 PATIENT CONTROLLED ANALGESIA SYRINGE INTRAVENOUS ONCE
Status: COMPLETED | OUTPATIENT
Start: 2025-08-12 | End: 2025-08-12

## 2025-08-12 RX ORDER — ACETAMINOPHEN 325 MG/1
650 TABLET ORAL EVERY 4 HOURS PRN
Status: DISCONTINUED | OUTPATIENT
Start: 2025-08-12 | End: 2025-08-17 | Stop reason: HOSPADM

## 2025-08-12 RX ADMIN — TERAZOSIN HYDROCHLORIDE 2 MG: 1 CAPSULE ORAL at 23:58

## 2025-08-12 RX ADMIN — SODIUM CHLORIDE 1000 ML: 0.9 INJECTION, SOLUTION INTRAVENOUS at 11:47

## 2025-08-12 RX ADMIN — HYDROMORPHONE HYDROCHLORIDE 0.2 MG: 0.2 INJECTION, SOLUTION INTRAMUSCULAR; INTRAVENOUS; SUBCUTANEOUS at 14:24

## 2025-08-12 RX ADMIN — IOHEXOL 100 ML: 350 INJECTION, SOLUTION INTRAVENOUS at 12:44

## 2025-08-12 RX ADMIN — PHYTONADIONE 5 MG: 5 TABLET ORAL at 17:09

## 2025-08-12 RX ADMIN — DILTIAZEM HYDROCHLORIDE 240 MG: 240 CAPSULE, COATED, EXTENDED RELEASE ORAL at 17:09

## 2025-08-12 RX ADMIN — CEFTRIAXONE 1000 MG: 10 INJECTION, POWDER, FOR SOLUTION INTRAVENOUS at 15:58

## 2025-08-12 RX ADMIN — LIDOCAINE HYDROCHLORIDE 1 APPLICATION: 20 JELLY TOPICAL at 14:35

## 2025-08-12 RX ADMIN — MECLIZINE 12.5 MG: 12.5 TABLET ORAL at 17:09

## 2025-08-12 RX ADMIN — OXYBUTYNIN CHLORIDE 5 MG: 5 TABLET ORAL at 17:09

## 2025-08-12 RX ADMIN — ONDANSETRON 4 MG: 2 INJECTION INTRAMUSCULAR; INTRAVENOUS at 11:41

## 2025-08-12 RX ADMIN — SODIUM CHLORIDE, SODIUM LACTATE, POTASSIUM CHLORIDE, AND CALCIUM CHLORIDE 100 ML/HR: .6; .31; .03; .02 INJECTION, SOLUTION INTRAVENOUS at 17:09

## 2025-08-12 RX ADMIN — Medication 2.5 MG: at 21:43

## 2025-08-12 RX ADMIN — TAMSULOSIN HYDROCHLORIDE 0.4 MG: 0.4 CAPSULE ORAL at 17:09

## 2025-08-12 RX ADMIN — HYDROMORPHONE HYDROCHLORIDE 0.2 MG: 0.2 INJECTION, SOLUTION INTRAMUSCULAR; INTRAVENOUS; SUBCUTANEOUS at 23:36

## 2025-08-13 ENCOUNTER — ANESTHESIA EVENT (INPATIENT)
Dept: PERIOP | Facility: HOSPITAL | Age: 80
DRG: 660 | End: 2025-08-13
Payer: MEDICARE

## 2025-08-13 ENCOUNTER — ANESTHESIA (INPATIENT)
Dept: PERIOP | Facility: HOSPITAL | Age: 80
DRG: 660 | End: 2025-08-13
Payer: MEDICARE

## 2025-08-13 ENCOUNTER — APPOINTMENT (INPATIENT)
Dept: RADIOLOGY | Facility: HOSPITAL | Age: 80
DRG: 660 | End: 2025-08-13
Payer: MEDICARE

## 2025-08-13 PROBLEM — N17.9 AKI (ACUTE KIDNEY INJURY) (HCC): Status: ACTIVE | Noted: 2018-07-31

## 2025-08-13 LAB
ABO GROUP BLD: NORMAL
ABO GROUP BLD: NORMAL
ANION GAP SERPL CALCULATED.3IONS-SCNC: 8 MMOL/L (ref 4–13)
BLD GP AB SCN SERPL QL: NEGATIVE
BUN SERPL-MCNC: 28 MG/DL (ref 5–25)
CALCIUM SERPL-MCNC: 9 MG/DL (ref 8.4–10.2)
CHLORIDE SERPL-SCNC: 106 MMOL/L (ref 96–108)
CO2 SERPL-SCNC: 23 MMOL/L (ref 21–32)
CREAT SERPL-MCNC: 1.9 MG/DL (ref 0.6–1.3)
ERYTHROCYTE [DISTWIDTH] IN BLOOD BY AUTOMATED COUNT: 13 % (ref 11.6–15.1)
GFR SERPL CREATININE-BSD FRML MDRD: 32 ML/MIN/1.73SQ M
GLUCOSE P FAST SERPL-MCNC: 164 MG/DL (ref 65–99)
GLUCOSE SERPL-MCNC: 164 MG/DL (ref 65–140)
HCT VFR BLD AUTO: 36.7 % (ref 36.5–49.3)
HGB BLD-MCNC: 12.2 G/DL (ref 12–17)
INR PPP: 2.03 (ref 0.85–1.19)
MCH RBC QN AUTO: 33.4 PG (ref 26.8–34.3)
MCHC RBC AUTO-ENTMCNC: 33.2 G/DL (ref 31.4–37.4)
MCV RBC AUTO: 101 FL (ref 82–98)
PLATELET # BLD AUTO: 231 THOUSANDS/UL (ref 149–390)
PMV BLD AUTO: 9.6 FL (ref 8.9–12.7)
POTASSIUM SERPL-SCNC: 4.4 MMOL/L (ref 3.5–5.3)
PROTHROMBIN TIME: 23.7 SECONDS (ref 12.3–15)
RBC # BLD AUTO: 3.65 MILLION/UL (ref 3.88–5.62)
RH BLD: POSITIVE
RH BLD: POSITIVE
SODIUM SERPL-SCNC: 137 MMOL/L (ref 135–147)
SPECIMEN EXPIRATION DATE: NORMAL
WBC # BLD AUTO: 21.73 THOUSAND/UL (ref 4.31–10.16)

## 2025-08-13 PROCEDURE — BT1D1ZZ FLUOROSCOPY OF RIGHT KIDNEY, URETER AND BLADDER USING LOW OSMOLAR CONTRAST: ICD-10-PCS | Performed by: RADIOLOGY

## 2025-08-13 PROCEDURE — 0T768DZ DILATION OF RIGHT URETER WITH INTRALUMINAL DEVICE, VIA NATURAL OR ARTIFICIAL OPENING ENDOSCOPIC: ICD-10-PCS | Performed by: UROLOGY

## 2025-08-13 PROCEDURE — 80048 BASIC METABOLIC PNL TOTAL CA: CPT | Performed by: INTERNAL MEDICINE

## 2025-08-13 PROCEDURE — 86900 BLOOD TYPING SEROLOGIC ABO: CPT | Performed by: PHYSICIAN ASSISTANT

## 2025-08-13 PROCEDURE — 52214 CYSTOSCOPY AND TREATMENT: CPT | Performed by: UROLOGY

## 2025-08-13 PROCEDURE — 30233K1 TRANSFUSION OF NONAUTOLOGOUS FROZEN PLASMA INTO PERIPHERAL VEIN, PERCUTANEOUS APPROACH: ICD-10-PCS | Performed by: STUDENT IN AN ORGANIZED HEALTH CARE EDUCATION/TRAINING PROGRAM

## 2025-08-13 PROCEDURE — 0TCB8ZZ EXTIRPATION OF MATTER FROM BLADDER, VIA NATURAL OR ARTIFICIAL OPENING ENDOSCOPIC: ICD-10-PCS | Performed by: UROLOGY

## 2025-08-13 PROCEDURE — 87186 SC STD MICRODIL/AGAR DIL: CPT | Performed by: UROLOGY

## 2025-08-13 PROCEDURE — C1758 CATHETER, URETERAL: HCPCS | Performed by: UROLOGY

## 2025-08-13 PROCEDURE — 74420 UROGRAPHY RTRGR +-KUB: CPT

## 2025-08-13 PROCEDURE — 52001 CYSTO W/IRRG&EVAC MLT CLOTS: CPT | Performed by: UROLOGY

## 2025-08-13 PROCEDURE — 85610 PROTHROMBIN TIME: CPT | Performed by: INTERNAL MEDICINE

## 2025-08-13 PROCEDURE — 99232 SBSQ HOSP IP/OBS MODERATE 35: CPT | Performed by: UROLOGY

## 2025-08-13 PROCEDURE — C2625 STENT, NON-COR, TEM W/DEL SY: HCPCS | Performed by: UROLOGY

## 2025-08-13 PROCEDURE — C1769 GUIDE WIRE: HCPCS | Performed by: UROLOGY

## 2025-08-13 PROCEDURE — 86901 BLOOD TYPING SEROLOGIC RH(D): CPT | Performed by: PHYSICIAN ASSISTANT

## 2025-08-13 PROCEDURE — 86850 RBC ANTIBODY SCREEN: CPT | Performed by: PHYSICIAN ASSISTANT

## 2025-08-13 PROCEDURE — 87086 URINE CULTURE/COLONY COUNT: CPT | Performed by: UROLOGY

## 2025-08-13 PROCEDURE — 52332 CYSTOSCOPY AND TREATMENT: CPT | Performed by: UROLOGY

## 2025-08-13 PROCEDURE — P9017 PLASMA 1 DONOR FRZ W/IN 8 HR: HCPCS

## 2025-08-13 PROCEDURE — 85027 COMPLETE CBC AUTOMATED: CPT | Performed by: INTERNAL MEDICINE

## 2025-08-13 PROCEDURE — 99232 SBSQ HOSP IP/OBS MODERATE 35: CPT | Performed by: PHYSICIAN ASSISTANT

## 2025-08-13 PROCEDURE — 87077 CULTURE AEROBIC IDENTIFY: CPT | Performed by: UROLOGY

## 2025-08-13 PROCEDURE — 0W3R8ZZ CONTROL BLEEDING IN GENITOURINARY TRACT, VIA NATURAL OR ARTIFICIAL OPENING ENDOSCOPIC: ICD-10-PCS | Performed by: UROLOGY

## 2025-08-13 DEVICE — IMPLANTABLE DEVICE: Type: IMPLANTABLE DEVICE | Status: FUNCTIONAL

## 2025-08-13 RX ORDER — FENTANYL CITRATE/PF 50 MCG/ML
50 SYRINGE (ML) INJECTION
Status: DISCONTINUED | OUTPATIENT
Start: 2025-08-13 | End: 2025-08-13 | Stop reason: HOSPADM

## 2025-08-13 RX ORDER — HYDROMORPHONE HCL/PF 1 MG/ML
0.5 SYRINGE (ML) INJECTION ONCE
Status: COMPLETED | OUTPATIENT
Start: 2025-08-13 | End: 2025-08-13

## 2025-08-13 RX ORDER — ONDANSETRON 2 MG/ML
4 INJECTION INTRAMUSCULAR; INTRAVENOUS ONCE AS NEEDED
Status: DISCONTINUED | OUTPATIENT
Start: 2025-08-13 | End: 2025-08-13 | Stop reason: HOSPADM

## 2025-08-13 RX ORDER — LIDOCAINE HYDROCHLORIDE 10 MG/ML
0.5 INJECTION, SOLUTION EPIDURAL; INFILTRATION; INTRACAUDAL; PERINEURAL ONCE AS NEEDED
Status: DISCONTINUED | OUTPATIENT
Start: 2025-08-13 | End: 2025-08-13 | Stop reason: HOSPADM

## 2025-08-13 RX ORDER — CEFAZOLIN SODIUM 2 G/50ML
2000 SOLUTION INTRAVENOUS ONCE
Status: COMPLETED | OUTPATIENT
Start: 2025-08-13 | End: 2025-08-13

## 2025-08-13 RX ORDER — METOCLOPRAMIDE HYDROCHLORIDE 5 MG/ML
10 INJECTION INTRAMUSCULAR; INTRAVENOUS ONCE AS NEEDED
Status: DISCONTINUED | OUTPATIENT
Start: 2025-08-13 | End: 2025-08-13 | Stop reason: HOSPADM

## 2025-08-13 RX ORDER — HYDROMORPHONE HCL/PF 1 MG/ML
0.5 SYRINGE (ML) INJECTION
Status: DISCONTINUED | OUTPATIENT
Start: 2025-08-13 | End: 2025-08-13 | Stop reason: HOSPADM

## 2025-08-13 RX ORDER — MAGNESIUM HYDROXIDE 1200 MG/15ML
LIQUID ORAL AS NEEDED
Status: DISCONTINUED | OUTPATIENT
Start: 2025-08-13 | End: 2025-08-13 | Stop reason: HOSPADM

## 2025-08-13 RX ORDER — SODIUM CHLORIDE, SODIUM LACTATE, POTASSIUM CHLORIDE, CALCIUM CHLORIDE 600; 310; 30; 20 MG/100ML; MG/100ML; MG/100ML; MG/100ML
125 INJECTION, SOLUTION INTRAVENOUS CONTINUOUS
Status: DISCONTINUED | OUTPATIENT
Start: 2025-08-13 | End: 2025-08-14

## 2025-08-13 RX ORDER — TERAZOSIN 1 MG/1
2 CAPSULE ORAL
Status: DISCONTINUED | OUTPATIENT
Start: 2025-08-13 | End: 2025-08-17 | Stop reason: HOSPADM

## 2025-08-13 RX ORDER — HYDROMORPHONE HCL IN WATER/PF 6 MG/30 ML
0.2 PATIENT CONTROLLED ANALGESIA SYRINGE INTRAVENOUS
Status: DISCONTINUED | OUTPATIENT
Start: 2025-08-13 | End: 2025-08-13 | Stop reason: HOSPADM

## 2025-08-13 RX ORDER — DIPHENHYDRAMINE HYDROCHLORIDE 50 MG/ML
12.5 INJECTION, SOLUTION INTRAMUSCULAR; INTRAVENOUS ONCE AS NEEDED
Status: DISCONTINUED | OUTPATIENT
Start: 2025-08-13 | End: 2025-08-13 | Stop reason: HOSPADM

## 2025-08-13 RX ORDER — ONDANSETRON 2 MG/ML
4 INJECTION INTRAMUSCULAR; INTRAVENOUS ONCE
Status: DISCONTINUED | OUTPATIENT
Start: 2025-08-13 | End: 2025-08-13 | Stop reason: HOSPADM

## 2025-08-13 RX ORDER — ACETAMINOPHEN 325 MG/1
975 TABLET ORAL ONCE
Status: DISCONTINUED | OUTPATIENT
Start: 2025-08-13 | End: 2025-08-13 | Stop reason: HOSPADM

## 2025-08-13 RX ADMIN — SODIUM CHLORIDE, SODIUM LACTATE, POTASSIUM CHLORIDE, AND CALCIUM CHLORIDE 125 ML/HR: .6; .31; .03; .02 INJECTION, SOLUTION INTRAVENOUS at 15:35

## 2025-08-13 RX ADMIN — ESCITALOPRAM OXALATE 20 MG: 20 TABLET, FILM COATED ORAL at 09:05

## 2025-08-13 RX ADMIN — MULTIPLE VITAMINS W/ MINERALS TAB 1 TABLET: TAB ORAL at 09:05

## 2025-08-13 RX ADMIN — MECLIZINE 12.5 MG: 12.5 TABLET ORAL at 09:05

## 2025-08-13 RX ADMIN — HYDROMORPHONE HYDROCHLORIDE 0.5 MG: 1 INJECTION, SOLUTION INTRAMUSCULAR; INTRAVENOUS; SUBCUTANEOUS at 01:13

## 2025-08-13 RX ADMIN — TAMSULOSIN HYDROCHLORIDE 0.4 MG: 0.4 CAPSULE ORAL at 15:35

## 2025-08-13 RX ADMIN — CEFTRIAXONE 1000 MG: 10 INJECTION, POWDER, FOR SOLUTION INTRAVENOUS at 15:39

## 2025-08-13 RX ADMIN — OXYBUTYNIN CHLORIDE 5 MG: 5 TABLET ORAL at 09:05

## 2025-08-13 RX ADMIN — OMEGA-3 FATTY ACIDS CAP 1000 MG 1000 MG: 1000 CAP at 09:05

## 2025-08-13 RX ADMIN — OXYBUTYNIN CHLORIDE 5 MG: 5 TABLET ORAL at 17:50

## 2025-08-13 RX ADMIN — DILTIAZEM HYDROCHLORIDE 240 MG: 240 CAPSULE, COATED, EXTENDED RELEASE ORAL at 17:50

## 2025-08-14 ENCOUNTER — TELEPHONE (OUTPATIENT)
Dept: UROLOGY | Facility: AMBULATORY SURGERY CENTER | Age: 80
End: 2025-08-14

## 2025-08-14 LAB
ABO GROUP BLD BPU: NORMAL
ABO GROUP BLD BPU: NORMAL
ANION GAP SERPL CALCULATED.3IONS-SCNC: 8 MMOL/L (ref 4–13)
BACTERIA UR CULT: ABNORMAL
BPU ID: NORMAL
BPU ID: NORMAL
BUN SERPL-MCNC: 29 MG/DL (ref 5–25)
CALCIUM SERPL-MCNC: 8.8 MG/DL (ref 8.4–10.2)
CHLORIDE SERPL-SCNC: 105 MMOL/L (ref 96–108)
CO2 SERPL-SCNC: 24 MMOL/L (ref 21–32)
CREAT SERPL-MCNC: 1.27 MG/DL (ref 0.6–1.3)
ERYTHROCYTE [DISTWIDTH] IN BLOOD BY AUTOMATED COUNT: 12.5 % (ref 11.6–15.1)
GFR SERPL CREATININE-BSD FRML MDRD: 52 ML/MIN/1.73SQ M
GLUCOSE SERPL-MCNC: 131 MG/DL (ref 65–140)
HCT VFR BLD AUTO: 28 % (ref 36.5–49.3)
HGB BLD-MCNC: 9.4 G/DL (ref 12–17)
INR PPP: 1.45 (ref 0.85–1.19)
MCH RBC QN AUTO: 33.8 PG (ref 26.8–34.3)
MCHC RBC AUTO-ENTMCNC: 32.5 G/DL (ref 31.4–37.4)
MCV RBC AUTO: 104 FL (ref 82–98)
PLATELET # BLD AUTO: 169 THOUSANDS/UL (ref 149–390)
PMV BLD AUTO: 10.2 FL (ref 8.9–12.7)
POTASSIUM SERPL-SCNC: 4.2 MMOL/L (ref 3.5–5.3)
PROTHROMBIN TIME: 18.4 SECONDS (ref 12.3–15)
RBC # BLD AUTO: 2.69 MILLION/UL (ref 3.88–5.62)
SODIUM SERPL-SCNC: 137 MMOL/L (ref 135–147)
UNIT DISPENSE STATUS: NORMAL
UNIT DISPENSE STATUS: NORMAL
UNIT PRODUCT CODE: NORMAL
UNIT PRODUCT CODE: NORMAL
UNIT PRODUCT VOLUME: 280 ML
UNIT PRODUCT VOLUME: 280 ML
UNIT RH: NORMAL
UNIT RH: NORMAL
WBC # BLD AUTO: 17.06 THOUSAND/UL (ref 4.31–10.16)

## 2025-08-14 PROCEDURE — 99232 SBSQ HOSP IP/OBS MODERATE 35: CPT

## 2025-08-14 PROCEDURE — 85610 PROTHROMBIN TIME: CPT | Performed by: PHYSICIAN ASSISTANT

## 2025-08-14 PROCEDURE — 99232 SBSQ HOSP IP/OBS MODERATE 35: CPT | Performed by: PHYSICIAN ASSISTANT

## 2025-08-14 PROCEDURE — 85027 COMPLETE CBC AUTOMATED: CPT | Performed by: UROLOGY

## 2025-08-14 PROCEDURE — 80048 BASIC METABOLIC PNL TOTAL CA: CPT | Performed by: UROLOGY

## 2025-08-14 RX ORDER — AMOXICILLIN 250 MG
2 CAPSULE ORAL
Status: DISCONTINUED | OUTPATIENT
Start: 2025-08-14 | End: 2025-08-17 | Stop reason: HOSPADM

## 2025-08-14 RX ADMIN — DILTIAZEM HYDROCHLORIDE 240 MG: 240 CAPSULE, COATED, EXTENDED RELEASE ORAL at 17:58

## 2025-08-14 RX ADMIN — CEFTRIAXONE 1000 MG: 10 INJECTION, POWDER, FOR SOLUTION INTRAVENOUS at 16:18

## 2025-08-14 RX ADMIN — ESCITALOPRAM OXALATE 20 MG: 20 TABLET, FILM COATED ORAL at 09:25

## 2025-08-14 RX ADMIN — SODIUM CHLORIDE, SODIUM LACTATE, POTASSIUM CHLORIDE, AND CALCIUM CHLORIDE 125 ML/HR: .6; .31; .03; .02 INJECTION, SOLUTION INTRAVENOUS at 16:13

## 2025-08-14 RX ADMIN — OXYBUTYNIN CHLORIDE 5 MG: 5 TABLET ORAL at 17:58

## 2025-08-14 RX ADMIN — SODIUM CHLORIDE, SODIUM LACTATE, POTASSIUM CHLORIDE, AND CALCIUM CHLORIDE 125 ML/HR: .6; .31; .03; .02 INJECTION, SOLUTION INTRAVENOUS at 08:18

## 2025-08-14 RX ADMIN — MULTIPLE VITAMINS W/ MINERALS TAB 1 TABLET: TAB ORAL at 09:25

## 2025-08-14 RX ADMIN — SODIUM CHLORIDE, SODIUM LACTATE, POTASSIUM CHLORIDE, AND CALCIUM CHLORIDE 125 ML/HR: .6; .31; .03; .02 INJECTION, SOLUTION INTRAVENOUS at 00:19

## 2025-08-14 RX ADMIN — MECLIZINE 12.5 MG: 12.5 TABLET ORAL at 09:25

## 2025-08-14 RX ADMIN — SENNOSIDES, DOCUSATE SODIUM 2 TABLET: 50; 8.6 TABLET, FILM COATED ORAL at 19:22

## 2025-08-14 RX ADMIN — OXYBUTYNIN CHLORIDE 5 MG: 5 TABLET ORAL at 09:25

## 2025-08-14 RX ADMIN — OMEGA-3 FATTY ACIDS CAP 1000 MG 1000 MG: 1000 CAP at 09:25

## 2025-08-14 RX ADMIN — TAMSULOSIN HYDROCHLORIDE 0.4 MG: 0.4 CAPSULE ORAL at 16:13

## 2025-08-15 LAB
ANION GAP SERPL CALCULATED.3IONS-SCNC: 6 MMOL/L (ref 4–13)
BACTERIA UR CULT: ABNORMAL
BUN SERPL-MCNC: 27 MG/DL (ref 5–25)
CALCIUM SERPL-MCNC: 8.9 MG/DL (ref 8.4–10.2)
CHLORIDE SERPL-SCNC: 106 MMOL/L (ref 96–108)
CO2 SERPL-SCNC: 28 MMOL/L (ref 21–32)
CREAT SERPL-MCNC: 1.08 MG/DL (ref 0.6–1.3)
ERYTHROCYTE [DISTWIDTH] IN BLOOD BY AUTOMATED COUNT: 12.2 % (ref 11.6–15.1)
GFR SERPL CREATININE-BSD FRML MDRD: 64 ML/MIN/1.73SQ M
GLUCOSE SERPL-MCNC: 134 MG/DL (ref 65–140)
HCT VFR BLD AUTO: 29.3 % (ref 36.5–49.3)
HCT VFR BLD AUTO: 31 % (ref 36.5–49.3)
HGB BLD-MCNC: 10.4 G/DL (ref 12–17)
HGB BLD-MCNC: 9.6 G/DL (ref 12–17)
INR PPP: 1.27 (ref 0.85–1.19)
MCH RBC QN AUTO: 33.2 PG (ref 26.8–34.3)
MCHC RBC AUTO-ENTMCNC: 32.8 G/DL (ref 31.4–37.4)
MCV RBC AUTO: 101 FL (ref 82–98)
PLATELET # BLD AUTO: 187 THOUSANDS/UL (ref 149–390)
PMV BLD AUTO: 10.4 FL (ref 8.9–12.7)
POTASSIUM SERPL-SCNC: 4.2 MMOL/L (ref 3.5–5.3)
PROTHROMBIN TIME: 16.6 SECONDS (ref 12.3–15)
RBC # BLD AUTO: 2.89 MILLION/UL (ref 3.88–5.62)
SODIUM SERPL-SCNC: 140 MMOL/L (ref 135–147)
WBC # BLD AUTO: 15.44 THOUSAND/UL (ref 4.31–10.16)

## 2025-08-15 PROCEDURE — 80048 BASIC METABOLIC PNL TOTAL CA: CPT | Performed by: PHYSICIAN ASSISTANT

## 2025-08-15 PROCEDURE — 85027 COMPLETE CBC AUTOMATED: CPT | Performed by: PHYSICIAN ASSISTANT

## 2025-08-15 PROCEDURE — 99232 SBSQ HOSP IP/OBS MODERATE 35: CPT

## 2025-08-15 PROCEDURE — 85018 HEMOGLOBIN: CPT

## 2025-08-15 PROCEDURE — 85014 HEMATOCRIT: CPT

## 2025-08-15 PROCEDURE — 85610 PROTHROMBIN TIME: CPT | Performed by: PHYSICIAN ASSISTANT

## 2025-08-15 PROCEDURE — 99232 SBSQ HOSP IP/OBS MODERATE 35: CPT | Performed by: UROLOGY

## 2025-08-15 RX ORDER — POLYETHYLENE GLYCOL 3350 17 G/17G
17 POWDER, FOR SOLUTION ORAL DAILY
Status: DISCONTINUED | OUTPATIENT
Start: 2025-08-15 | End: 2025-08-17 | Stop reason: HOSPADM

## 2025-08-15 RX ADMIN — CEFTRIAXONE 1000 MG: 10 INJECTION, POWDER, FOR SOLUTION INTRAVENOUS at 16:47

## 2025-08-15 RX ADMIN — MECLIZINE 12.5 MG: 12.5 TABLET ORAL at 08:31

## 2025-08-15 RX ADMIN — OXYBUTYNIN CHLORIDE 5 MG: 5 TABLET ORAL at 08:31

## 2025-08-15 RX ADMIN — MULTIPLE VITAMINS W/ MINERALS TAB 1 TABLET: TAB ORAL at 08:31

## 2025-08-15 RX ADMIN — APIXABAN 5 MG: 5 TABLET, FILM COATED ORAL at 17:35

## 2025-08-15 RX ADMIN — DILTIAZEM HYDROCHLORIDE 240 MG: 240 CAPSULE, COATED, EXTENDED RELEASE ORAL at 17:35

## 2025-08-15 RX ADMIN — POLYETHYLENE GLYCOL 3350 17 G: 17 POWDER, FOR SOLUTION ORAL at 08:42

## 2025-08-15 RX ADMIN — APIXABAN 5 MG: 5 TABLET, FILM COATED ORAL at 08:42

## 2025-08-15 RX ADMIN — Medication 3 MG: at 21:26

## 2025-08-15 RX ADMIN — TERAZOSIN HYDROCHLORIDE 2 MG: 1 CAPSULE ORAL at 21:25

## 2025-08-15 RX ADMIN — ESCITALOPRAM OXALATE 20 MG: 20 TABLET, FILM COATED ORAL at 08:31

## 2025-08-15 RX ADMIN — TAMSULOSIN HYDROCHLORIDE 0.4 MG: 0.4 CAPSULE ORAL at 16:47

## 2025-08-15 RX ADMIN — LOSARTAN POTASSIUM 50 MG: 50 TABLET, FILM COATED ORAL at 08:31

## 2025-08-15 RX ADMIN — SENNOSIDES, DOCUSATE SODIUM 2 TABLET: 50; 8.6 TABLET, FILM COATED ORAL at 21:23

## 2025-08-15 RX ADMIN — OXYBUTYNIN CHLORIDE 5 MG: 5 TABLET ORAL at 17:35

## 2025-08-15 RX ADMIN — OMEGA-3 FATTY ACIDS CAP 1000 MG 1000 MG: 1000 CAP at 08:31

## 2025-08-16 PROBLEM — R41.0 CONFUSION: Status: ACTIVE | Noted: 2025-08-16

## 2025-08-16 PROBLEM — N17.9 AKI (ACUTE KIDNEY INJURY) (HCC): Status: RESOLVED | Noted: 2018-07-31 | Resolved: 2025-08-16

## 2025-08-16 LAB
ANION GAP SERPL CALCULATED.3IONS-SCNC: 7 MMOL/L (ref 4–13)
ATRIAL RATE: 283 BPM
BUN SERPL-MCNC: 22 MG/DL (ref 5–25)
CALCIUM SERPL-MCNC: 9.4 MG/DL (ref 8.4–10.2)
CHLORIDE SERPL-SCNC: 105 MMOL/L (ref 96–108)
CO2 SERPL-SCNC: 28 MMOL/L (ref 21–32)
CREAT SERPL-MCNC: 1.05 MG/DL (ref 0.6–1.3)
ERYTHROCYTE [DISTWIDTH] IN BLOOD BY AUTOMATED COUNT: 12.2 % (ref 11.6–15.1)
GFR SERPL CREATININE-BSD FRML MDRD: 66 ML/MIN/1.73SQ M
GLUCOSE SERPL-MCNC: 101 MG/DL (ref 65–140)
HCT VFR BLD AUTO: 34 % (ref 36.5–49.3)
HGB BLD-MCNC: 11.6 G/DL (ref 12–17)
MAGNESIUM SERPL-MCNC: 1.7 MG/DL (ref 1.9–2.7)
MCH RBC QN AUTO: 33.6 PG (ref 26.8–34.3)
MCHC RBC AUTO-ENTMCNC: 34.1 G/DL (ref 31.4–37.4)
MCV RBC AUTO: 99 FL (ref 82–98)
P AXIS: 257 DEGREES
PLATELET # BLD AUTO: 241 THOUSANDS/UL (ref 149–390)
PMV BLD AUTO: 10 FL (ref 8.9–12.7)
POTASSIUM SERPL-SCNC: 3.8 MMOL/L (ref 3.5–5.3)
QRS AXIS: 45 DEGREES
QRSD INTERVAL: 88 MS
QT INTERVAL: 346 MS
QTC INTERVAL: 486 MS
RBC # BLD AUTO: 3.45 MILLION/UL (ref 3.88–5.62)
SODIUM SERPL-SCNC: 140 MMOL/L (ref 135–147)
T WAVE AXIS: 189 DEGREES
VENTRICULAR RATE: 119 BPM
WBC # BLD AUTO: 11.05 THOUSAND/UL (ref 4.31–10.16)

## 2025-08-16 PROCEDURE — 85027 COMPLETE CBC AUTOMATED: CPT | Performed by: STUDENT IN AN ORGANIZED HEALTH CARE EDUCATION/TRAINING PROGRAM

## 2025-08-16 PROCEDURE — 93010 ELECTROCARDIOGRAM REPORT: CPT | Performed by: INTERNAL MEDICINE

## 2025-08-16 PROCEDURE — 80048 BASIC METABOLIC PNL TOTAL CA: CPT | Performed by: STUDENT IN AN ORGANIZED HEALTH CARE EDUCATION/TRAINING PROGRAM

## 2025-08-16 PROCEDURE — 0TPBX0Z REMOVAL OF DRAINAGE DEVICE FROM BLADDER, EXTERNAL APPROACH: ICD-10-PCS | Performed by: STUDENT IN AN ORGANIZED HEALTH CARE EDUCATION/TRAINING PROGRAM

## 2025-08-16 PROCEDURE — 83735 ASSAY OF MAGNESIUM: CPT

## 2025-08-16 PROCEDURE — 99232 SBSQ HOSP IP/OBS MODERATE 35: CPT

## 2025-08-16 PROCEDURE — 93005 ELECTROCARDIOGRAM TRACING: CPT

## 2025-08-16 RX ORDER — METOPROLOL TARTRATE 1 MG/ML
5 INJECTION, SOLUTION INTRAVENOUS ONCE
Status: COMPLETED | OUTPATIENT
Start: 2025-08-16 | End: 2025-08-16

## 2025-08-16 RX ORDER — MAGNESIUM SULFATE HEPTAHYDRATE 40 MG/ML
2 INJECTION, SOLUTION INTRAVENOUS ONCE
Status: COMPLETED | OUTPATIENT
Start: 2025-08-16 | End: 2025-08-16

## 2025-08-16 RX ADMIN — MULTIPLE VITAMINS W/ MINERALS TAB 1 TABLET: TAB ORAL at 08:59

## 2025-08-16 RX ADMIN — DILTIAZEM HYDROCHLORIDE 240 MG: 240 CAPSULE, COATED, EXTENDED RELEASE ORAL at 17:07

## 2025-08-16 RX ADMIN — OXYBUTYNIN CHLORIDE 5 MG: 5 TABLET ORAL at 08:58

## 2025-08-16 RX ADMIN — MAGNESIUM SULFATE HEPTAHYDRATE 2 G: 40 INJECTION, SOLUTION INTRAVENOUS at 11:50

## 2025-08-16 RX ADMIN — OXYBUTYNIN CHLORIDE 5 MG: 5 TABLET ORAL at 17:06

## 2025-08-16 RX ADMIN — Medication 3 MG: at 22:03

## 2025-08-16 RX ADMIN — APIXABAN 5 MG: 5 TABLET, FILM COATED ORAL at 17:07

## 2025-08-16 RX ADMIN — APIXABAN 5 MG: 5 TABLET, FILM COATED ORAL at 08:59

## 2025-08-16 RX ADMIN — TERAZOSIN HYDROCHLORIDE 2 MG: 1 CAPSULE ORAL at 22:03

## 2025-08-16 RX ADMIN — TAMSULOSIN HYDROCHLORIDE 0.4 MG: 0.4 CAPSULE ORAL at 17:06

## 2025-08-16 RX ADMIN — METOPROLOL TARTRATE 5 MG: 1 INJECTION, SOLUTION INTRAVENOUS at 06:43

## 2025-08-16 RX ADMIN — ESCITALOPRAM OXALATE 20 MG: 20 TABLET, FILM COATED ORAL at 08:58

## 2025-08-16 RX ADMIN — OMEGA-3 FATTY ACIDS CAP 1000 MG 1000 MG: 1000 CAP at 08:59

## 2025-08-16 RX ADMIN — MECLIZINE 12.5 MG: 12.5 TABLET ORAL at 08:59

## 2025-08-16 RX ADMIN — SENNOSIDES, DOCUSATE SODIUM 2 TABLET: 50; 8.6 TABLET, FILM COATED ORAL at 22:06

## 2025-08-16 RX ADMIN — LOSARTAN POTASSIUM 50 MG: 50 TABLET, FILM COATED ORAL at 08:59

## 2025-08-16 RX ADMIN — POLYETHYLENE GLYCOL 3350 17 G: 17 POWDER, FOR SOLUTION ORAL at 08:59

## 2025-08-16 RX ADMIN — METOROPROLOL TARTRATE 5 MG: 5 INJECTION, SOLUTION INTRAVENOUS at 02:59

## 2025-08-17 VITALS
WEIGHT: 205 LBS | HEART RATE: 77 BPM | BODY MASS INDEX: 29.35 KG/M2 | SYSTOLIC BLOOD PRESSURE: 126 MMHG | HEIGHT: 70 IN | DIASTOLIC BLOOD PRESSURE: 84 MMHG | OXYGEN SATURATION: 91 % | RESPIRATION RATE: 18 BRPM | TEMPERATURE: 99 F

## 2025-08-17 PROBLEM — R41.0 CONFUSION: Status: RESOLVED | Noted: 2025-08-16 | Resolved: 2025-08-17

## 2025-08-17 PROBLEM — R31.0 GROSS HEMATURIA: Status: RESOLVED | Noted: 2025-08-12 | Resolved: 2025-08-17

## 2025-08-17 PROBLEM — R33.9 URINARY RETENTION: Status: RESOLVED | Noted: 2025-08-12 | Resolved: 2025-08-17

## 2025-08-17 PROCEDURE — 99239 HOSP IP/OBS DSCHRG MGMT >30: CPT

## 2025-08-17 RX ORDER — OXYBUTYNIN CHLORIDE 5 MG/1
5 TABLET ORAL 2 TIMES DAILY
Qty: 28 TABLET | Refills: 0 | Status: ON HOLD | OUTPATIENT
Start: 2025-08-17 | End: 2025-08-31

## 2025-08-17 RX ADMIN — APIXABAN 5 MG: 5 TABLET, FILM COATED ORAL at 09:06

## 2025-08-17 RX ADMIN — OMEGA-3 FATTY ACIDS CAP 1000 MG 1000 MG: 1000 CAP at 09:06

## 2025-08-17 RX ADMIN — OXYBUTYNIN CHLORIDE 5 MG: 5 TABLET ORAL at 09:06

## 2025-08-17 RX ADMIN — LOSARTAN POTASSIUM 50 MG: 50 TABLET, FILM COATED ORAL at 09:06

## 2025-08-17 RX ADMIN — MULTIPLE VITAMINS W/ MINERALS TAB 1 TABLET: TAB ORAL at 09:06

## 2025-08-17 RX ADMIN — ESCITALOPRAM OXALATE 20 MG: 20 TABLET, FILM COATED ORAL at 09:06

## 2025-08-17 RX ADMIN — MECLIZINE 12.5 MG: 12.5 TABLET ORAL at 09:06

## 2025-08-18 ENCOUNTER — TRANSITIONAL CARE MANAGEMENT (OUTPATIENT)
Age: 80
End: 2025-08-18

## 2025-08-26 PROBLEM — R93.5 ABNORMAL CT OF THE ABDOMEN: Status: ACTIVE | Noted: 2025-08-26

## 2025-08-26 PROBLEM — R31.9 HEMATURIA: Status: ACTIVE | Noted: 2025-08-26

## 2025-08-26 PROBLEM — A41.9 SEPSIS (HCC): Status: ACTIVE | Noted: 2025-08-26

## 2025-08-27 PROBLEM — N39.0 UTI (URINARY TRACT INFECTION): Status: ACTIVE | Noted: 2025-08-27

## (undated) DEVICE — PACK TUR

## (undated) DEVICE — STERILE SURGICAL LUBRICANT,  TUBE: Brand: SURGILUBE

## (undated) DEVICE — Device

## (undated) DEVICE — CHLORHEXIDINE 4PCT 4 OZ

## (undated) DEVICE — GLOVE SRG BIOGEL 7

## (undated) DEVICE — BAG URINE DRAINAGE 2000ML ANTI RFLX LF

## (undated) DEVICE — SYRINGE 20ML LL

## (undated) DEVICE — SPECIMEN CONTAINER STERILE PEEL PACK

## (undated) DEVICE — TABLE COVER: Brand: CONVERTORS

## (undated) DEVICE — INVIEW CLEAR LEGGINGS: Brand: CONVERTORS

## (undated) DEVICE — 3M™ IOBAN™ 2 ANTIMICROBIAL INCISE DRAPE 6648EZ: Brand: IOBAN™ 2

## (undated) DEVICE — CHLORAPREP HI-LITE 10.5ML ORANGE

## (undated) DEVICE — PREMIUM DRY TRAY LF: Brand: MEDLINE INDUSTRIES, INC.

## (undated) DEVICE — STERILE POLYISOPRENE POWDER-FREE SURGICAL GLOVES: Brand: PROTEXIS

## (undated) DEVICE — NEEDLE 25G X 1 1/2

## (undated) DEVICE — SKIN MARKER DUAL TIP WITH RULER CAP, FLEXIBLE RULER AND LABELS: Brand: DEVON

## (undated) DEVICE — NEEDLE BLUNT 18 G X 1 1/2IN

## (undated) DEVICE — CATH FOLEY 20FR 5ML 2 WAY SILICONE ELASTIMER

## (undated) DEVICE — SYRINGE 30ML LL

## (undated) DEVICE — NEEDLE SPINAL 22G X 3.5IN  QUINCKE

## (undated) DEVICE — SYRINGE 10ML LL

## (undated) DEVICE — GAUZE SPONGES,16 PLY: Brand: CURITY

## (undated) DEVICE — UROCATCH BAG

## (undated) DEVICE — CATH FOLEY 18FR 5ML 2 WAY SILICONE ELASTIMER